# Patient Record
Sex: FEMALE | Race: WHITE | NOT HISPANIC OR LATINO | Employment: PART TIME | URBAN - NONMETROPOLITAN AREA
[De-identification: names, ages, dates, MRNs, and addresses within clinical notes are randomized per-mention and may not be internally consistent; named-entity substitution may affect disease eponyms.]

---

## 2018-01-03 ENCOUNTER — OFFICE VISIT CONVERTED (OUTPATIENT)
Dept: FAMILY MEDICINE CLINIC | Age: 53
End: 2018-01-03
Attending: NURSE PRACTITIONER

## 2018-01-18 ENCOUNTER — CONVERSION ENCOUNTER (OUTPATIENT)
Dept: FAMILY MEDICINE CLINIC | Age: 53
End: 2018-01-18

## 2018-01-20 ENCOUNTER — CONVERSION ENCOUNTER (OUTPATIENT)
Dept: FAMILY MEDICINE CLINIC | Age: 53
End: 2018-01-20

## 2018-01-26 ENCOUNTER — CONVERSION ENCOUNTER (OUTPATIENT)
Dept: FAMILY MEDICINE CLINIC | Age: 53
End: 2018-01-26

## 2018-02-08 ENCOUNTER — CONVERSION ENCOUNTER (OUTPATIENT)
Dept: FAMILY MEDICINE CLINIC | Age: 53
End: 2018-02-08

## 2018-02-13 ENCOUNTER — OFFICE VISIT CONVERTED (OUTPATIENT)
Dept: FAMILY MEDICINE CLINIC | Age: 53
End: 2018-02-13
Attending: NURSE PRACTITIONER

## 2018-02-16 ENCOUNTER — CONVERSION ENCOUNTER (OUTPATIENT)
Dept: FAMILY MEDICINE CLINIC | Age: 53
End: 2018-02-16

## 2018-02-21 ENCOUNTER — CONVERSION ENCOUNTER (OUTPATIENT)
Dept: FAMILY MEDICINE CLINIC | Age: 53
End: 2018-02-21

## 2018-03-14 ENCOUNTER — CONVERSION ENCOUNTER (OUTPATIENT)
Dept: FAMILY MEDICINE CLINIC | Age: 53
End: 2018-03-14

## 2018-04-10 ENCOUNTER — CONVERSION ENCOUNTER (OUTPATIENT)
Dept: FAMILY MEDICINE CLINIC | Age: 53
End: 2018-04-10

## 2018-04-23 ENCOUNTER — CONVERSION ENCOUNTER (OUTPATIENT)
Dept: FAMILY MEDICINE CLINIC | Age: 53
End: 2018-04-23

## 2018-04-27 ENCOUNTER — CONVERSION ENCOUNTER (OUTPATIENT)
Dept: FAMILY MEDICINE CLINIC | Age: 53
End: 2018-04-27

## 2018-05-11 ENCOUNTER — CONVERSION ENCOUNTER (OUTPATIENT)
Dept: FAMILY MEDICINE CLINIC | Age: 53
End: 2018-05-11

## 2018-06-01 ENCOUNTER — CONVERSION ENCOUNTER (OUTPATIENT)
Dept: FAMILY MEDICINE CLINIC | Age: 53
End: 2018-06-01

## 2018-06-22 ENCOUNTER — CONVERSION ENCOUNTER (OUTPATIENT)
Dept: FAMILY MEDICINE CLINIC | Age: 53
End: 2018-06-22

## 2018-07-17 ENCOUNTER — CONVERSION ENCOUNTER (OUTPATIENT)
Dept: FAMILY MEDICINE CLINIC | Age: 53
End: 2018-07-17

## 2018-08-07 ENCOUNTER — CONVERSION ENCOUNTER (OUTPATIENT)
Dept: FAMILY MEDICINE CLINIC | Age: 53
End: 2018-08-07

## 2018-09-04 ENCOUNTER — CONVERSION ENCOUNTER (OUTPATIENT)
Dept: FAMILY MEDICINE CLINIC | Age: 53
End: 2018-09-04

## 2018-09-25 ENCOUNTER — OFFICE VISIT CONVERTED (OUTPATIENT)
Dept: FAMILY MEDICINE CLINIC | Age: 53
End: 2018-09-25
Attending: NURSE PRACTITIONER

## 2018-10-02 ENCOUNTER — CONVERSION ENCOUNTER (OUTPATIENT)
Dept: FAMILY MEDICINE CLINIC | Age: 53
End: 2018-10-02

## 2018-10-23 ENCOUNTER — CONVERSION ENCOUNTER (OUTPATIENT)
Dept: FAMILY MEDICINE CLINIC | Age: 53
End: 2018-10-23

## 2018-10-29 ENCOUNTER — OFFICE VISIT CONVERTED (OUTPATIENT)
Dept: FAMILY MEDICINE CLINIC | Age: 53
End: 2018-10-29
Attending: NURSE PRACTITIONER

## 2018-11-20 ENCOUNTER — CONVERSION ENCOUNTER (OUTPATIENT)
Dept: FAMILY MEDICINE CLINIC | Age: 53
End: 2018-11-20

## 2018-12-20 ENCOUNTER — CONVERSION ENCOUNTER (OUTPATIENT)
Dept: FAMILY MEDICINE CLINIC | Age: 53
End: 2018-12-20

## 2019-01-23 ENCOUNTER — CONVERSION ENCOUNTER (OUTPATIENT)
Dept: FAMILY MEDICINE CLINIC | Age: 54
End: 2019-01-23

## 2019-02-21 ENCOUNTER — CONVERSION ENCOUNTER (OUTPATIENT)
Dept: FAMILY MEDICINE CLINIC | Age: 54
End: 2019-02-21

## 2019-03-08 ENCOUNTER — HOSPITAL ENCOUNTER (OUTPATIENT)
Dept: OTHER | Facility: HOSPITAL | Age: 54
Discharge: HOME | End: 2019-03-08
Attending: FAMILY MEDICINE

## 2019-03-08 ENCOUNTER — OFFICE VISIT CONVERTED (OUTPATIENT)
Dept: FAMILY MEDICINE CLINIC | Age: 54
End: 2019-03-08
Attending: FAMILY MEDICINE

## 2019-03-09 ENCOUNTER — HOSPITAL ENCOUNTER (OUTPATIENT)
Dept: OTHER | Facility: HOSPITAL | Age: 54
Discharge: HOME | End: 2019-03-09
Attending: FAMILY MEDICINE

## 2019-03-09 LAB
ALBUMIN SERPL-MCNC: 4.3 G/DL (ref 3.5–5)
ALBUMIN/GLOB SERPL: 1.5 {RATIO} (ref 1.4–2.6)
ALP SERPL-CCNC: 125 U/L (ref 53–141)
ALT SERPL-CCNC: 20 U/L (ref 10–40)
ANION GAP SERPL CALC-SCNC: 17 MMOL/L (ref 8–19)
AST SERPL-CCNC: 16 U/L (ref 15–50)
BASOPHILS # BLD MANUAL: 0.03 10*3/UL (ref 0–0.2)
BASOPHILS NFR BLD MANUAL: 0.4 % (ref 0–3)
BILIRUB SERPL-MCNC: 0.32 MG/DL (ref 0.2–1.3)
BUN SERPL-MCNC: 12 MG/DL (ref 5–25)
BUN/CREAT SERPL: 19 {RATIO} (ref 6–20)
CALCIUM SERPL-MCNC: 9.3 MG/DL (ref 8.7–10.4)
CHLORIDE SERPL-SCNC: 99 MMOL/L (ref 99–111)
CHOLEST SERPL-MCNC: 200 MG/DL (ref 107–200)
CHOLEST/HDLC SERPL: 3 {RATIO} (ref 3–6)
CONV CO2: 27 MMOL/L (ref 22–32)
CONV CREATININE URINE, RANDOM: 87.9 MG/DL (ref 10–300)
CONV MICROALBUM.,U,RANDOM: <12 MG/L (ref 0–20)
CONV TOTAL PROTEIN: 7.2 G/DL (ref 6.3–8.2)
CREAT UR-MCNC: 0.63 MG/DL (ref 0.5–0.9)
DEPRECATED RDW RBC AUTO: 41.1 FL
EOSINOPHIL # BLD MANUAL: 0.27 10*3/UL (ref 0–0.7)
EOSINOPHIL NFR BLD MANUAL: 3.9 % (ref 0–7)
ERYTHROCYTE [DISTWIDTH] IN BLOOD BY AUTOMATED COUNT: 11.8 % (ref 11.5–14.5)
EST. AVERAGE GLUCOSE BLD GHB EST-MCNC: 140 MG/DL
GFR SERPLBLD BASED ON 1.73 SQ M-ARVRAT: >60 ML/MIN/{1.73_M2}
GLOBULIN UR ELPH-MCNC: 2.9 G/DL (ref 2–3.5)
GLUCOSE SERPL-MCNC: 142 MG/DL (ref 65–99)
GRANS (ABSOLUTE): 4.25 10*3/UL (ref 2–8)
GRANS: 61.7 % (ref 30–85)
HBA1C MFR BLD: 14.8 G/DL (ref 12–16)
HBA1C MFR BLD: 6.5 % (ref 3.5–5.7)
HCT VFR BLD AUTO: 44.1 % (ref 37–47)
HDLC SERPL-MCNC: 67 MG/DL (ref 40–60)
IMM GRANULOCYTES # BLD: 0.02 10*3/UL (ref 0–0.54)
IMM GRANULOCYTES NFR BLD: 0.3 % (ref 0–0.43)
LDLC SERPL CALC-MCNC: 108 MG/DL (ref 70–100)
LYMPHOCYTES # BLD MANUAL: 1.95 10*3/UL (ref 1–5)
LYMPHOCYTES NFR BLD MANUAL: 5.4 % (ref 3–10)
MCH RBC QN AUTO: 31.1 PG (ref 27–31)
MCHC RBC AUTO-ENTMCNC: 33.6 G/DL (ref 33–37)
MCV RBC AUTO: 92.6 FL (ref 81–99)
MICROALBUMIN/CREAT UR: 13.7 MG/G{CRE} (ref 0–35)
MONOCYTES # BLD AUTO: 0.37 10*3/UL (ref 0.2–1.2)
OSMOLALITY SERPL CALC.SUM OF ELEC: 290 MOSM/KG (ref 273–304)
PLATELET # BLD AUTO: 339 10*3/UL (ref 130–400)
PMV BLD AUTO: 8.6 FL (ref 7.4–10.4)
POTASSIUM SERPL-SCNC: 3.8 MMOL/L (ref 3.5–5.3)
RBC # BLD AUTO: 4.76 10*6/UL (ref 4.2–5.4)
SODIUM SERPL-SCNC: 139 MMOL/L (ref 135–147)
TRIGL SERPL-MCNC: 124 MG/DL (ref 40–150)
VARIANT LYMPHS NFR BLD MANUAL: 28.3 % (ref 20–45)
VLDLC SERPL-MCNC: 25 MG/DL (ref 5–37)
WBC # BLD AUTO: 6.89 10*3/UL (ref 4.8–10.8)

## 2019-03-19 ENCOUNTER — OFFICE VISIT CONVERTED (OUTPATIENT)
Dept: FAMILY MEDICINE CLINIC | Age: 54
End: 2019-03-19
Attending: NURSE PRACTITIONER

## 2019-03-21 ENCOUNTER — CONVERSION ENCOUNTER (OUTPATIENT)
Dept: FAMILY MEDICINE CLINIC | Age: 54
End: 2019-03-21

## 2019-04-03 ENCOUNTER — CONVERSION ENCOUNTER (OUTPATIENT)
Dept: FAMILY MEDICINE CLINIC | Age: 54
End: 2019-04-03

## 2019-04-18 ENCOUNTER — CONVERSION ENCOUNTER (OUTPATIENT)
Dept: FAMILY MEDICINE CLINIC | Age: 54
End: 2019-04-18

## 2019-04-29 ENCOUNTER — CONVERSION ENCOUNTER (OUTPATIENT)
Dept: FAMILY MEDICINE CLINIC | Age: 54
End: 2019-04-29

## 2019-05-06 ENCOUNTER — CONVERSION ENCOUNTER (OUTPATIENT)
Dept: FAMILY MEDICINE CLINIC | Age: 54
End: 2019-05-06

## 2019-05-23 ENCOUNTER — CONVERSION ENCOUNTER (OUTPATIENT)
Dept: FAMILY MEDICINE CLINIC | Age: 54
End: 2019-05-23

## 2019-06-06 ENCOUNTER — CONVERSION ENCOUNTER (OUTPATIENT)
Dept: FAMILY MEDICINE CLINIC | Age: 54
End: 2019-06-06

## 2019-06-27 ENCOUNTER — CONVERSION ENCOUNTER (OUTPATIENT)
Dept: FAMILY MEDICINE CLINIC | Age: 54
End: 2019-06-27

## 2019-07-01 ENCOUNTER — CONVERSION ENCOUNTER (OUTPATIENT)
Dept: FAMILY MEDICINE CLINIC | Age: 54
End: 2019-07-01

## 2019-07-15 ENCOUNTER — CONVERSION ENCOUNTER (OUTPATIENT)
Dept: FAMILY MEDICINE CLINIC | Age: 54
End: 2019-07-15

## 2019-07-29 ENCOUNTER — CONVERSION ENCOUNTER (OUTPATIENT)
Dept: FAMILY MEDICINE CLINIC | Age: 54
End: 2019-07-29

## 2019-08-22 ENCOUNTER — CONVERSION ENCOUNTER (OUTPATIENT)
Dept: FAMILY MEDICINE CLINIC | Age: 54
End: 2019-08-22

## 2019-09-20 ENCOUNTER — CONVERSION ENCOUNTER (OUTPATIENT)
Dept: FAMILY MEDICINE CLINIC | Age: 54
End: 2019-09-20

## 2019-10-08 ENCOUNTER — OFFICE VISIT CONVERTED (OUTPATIENT)
Dept: FAMILY MEDICINE CLINIC | Age: 54
End: 2019-10-08
Attending: NURSE PRACTITIONER

## 2019-10-18 ENCOUNTER — CONVERSION ENCOUNTER (OUTPATIENT)
Dept: FAMILY MEDICINE CLINIC | Age: 54
End: 2019-10-18

## 2019-10-18 ENCOUNTER — HOSPITAL ENCOUNTER (OUTPATIENT)
Dept: OTHER | Facility: HOSPITAL | Age: 54
Discharge: HOME | End: 2019-10-18
Attending: NURSE PRACTITIONER

## 2019-10-18 LAB
ALBUMIN SERPL-MCNC: 4.3 G/DL (ref 3.5–5)
ALBUMIN/GLOB SERPL: 1.7 {RATIO} (ref 1.4–2.6)
ALP SERPL-CCNC: 112 U/L (ref 53–141)
ALT SERPL-CCNC: 25 U/L (ref 10–40)
ANION GAP SERPL CALC-SCNC: 18 MMOL/L (ref 8–19)
AST SERPL-CCNC: 17 U/L (ref 15–50)
BILIRUB SERPL-MCNC: 0.39 MG/DL (ref 0.2–1.3)
BUN SERPL-MCNC: 13 MG/DL (ref 5–25)
BUN/CREAT SERPL: 18 {RATIO} (ref 6–20)
CALCIUM SERPL-MCNC: 9.6 MG/DL (ref 8.7–10.4)
CHLORIDE SERPL-SCNC: 101 MMOL/L (ref 99–111)
CHOLEST SERPL-MCNC: 189 MG/DL (ref 107–200)
CHOLEST/HDLC SERPL: 3.2 {RATIO} (ref 3–6)
CONV CO2: 24 MMOL/L (ref 22–32)
CONV TOTAL PROTEIN: 6.8 G/DL (ref 6.3–8.2)
CREAT UR-MCNC: 0.71 MG/DL (ref 0.5–0.9)
EST. AVERAGE GLUCOSE BLD GHB EST-MCNC: 146 MG/DL
GFR SERPLBLD BASED ON 1.73 SQ M-ARVRAT: >60 ML/MIN/{1.73_M2}
GLOBULIN UR ELPH-MCNC: 2.5 G/DL (ref 2–3.5)
GLUCOSE SERPL-MCNC: 129 MG/DL (ref 65–99)
HBA1C MFR BLD: 6.7 % (ref 3.5–5.7)
HDLC SERPL-MCNC: 59 MG/DL (ref 40–60)
LDLC SERPL CALC-MCNC: 106 MG/DL (ref 70–100)
OSMOLALITY SERPL CALC.SUM OF ELEC: 290 MOSM/KG (ref 273–304)
POTASSIUM SERPL-SCNC: 4 MMOL/L (ref 3.5–5.3)
SODIUM SERPL-SCNC: 139 MMOL/L (ref 135–147)
TRIGL SERPL-MCNC: 122 MG/DL (ref 40–150)
VLDLC SERPL-MCNC: 24 MG/DL (ref 5–37)

## 2019-11-18 ENCOUNTER — CONVERSION ENCOUNTER (OUTPATIENT)
Dept: FAMILY MEDICINE CLINIC | Age: 54
End: 2019-11-18

## 2019-11-25 ENCOUNTER — CONVERSION ENCOUNTER (OUTPATIENT)
Dept: FAMILY MEDICINE CLINIC | Age: 54
End: 2019-11-25

## 2019-12-02 ENCOUNTER — CONVERSION ENCOUNTER (OUTPATIENT)
Dept: FAMILY MEDICINE CLINIC | Age: 54
End: 2019-12-02

## 2019-12-09 ENCOUNTER — CONVERSION ENCOUNTER (OUTPATIENT)
Dept: FAMILY MEDICINE CLINIC | Age: 54
End: 2019-12-09

## 2019-12-16 ENCOUNTER — CONVERSION ENCOUNTER (OUTPATIENT)
Dept: FAMILY MEDICINE CLINIC | Age: 54
End: 2019-12-16

## 2019-12-30 ENCOUNTER — CONVERSION ENCOUNTER (OUTPATIENT)
Dept: FAMILY MEDICINE CLINIC | Age: 54
End: 2019-12-30

## 2020-01-13 ENCOUNTER — CONVERSION ENCOUNTER (OUTPATIENT)
Dept: FAMILY MEDICINE CLINIC | Age: 55
End: 2020-01-13

## 2020-01-27 ENCOUNTER — CONVERSION ENCOUNTER (OUTPATIENT)
Dept: FAMILY MEDICINE CLINIC | Age: 55
End: 2020-01-27

## 2020-02-04 ENCOUNTER — CONVERSION ENCOUNTER (OUTPATIENT)
Dept: FAMILY MEDICINE CLINIC | Age: 55
End: 2020-02-04

## 2020-02-11 ENCOUNTER — CONVERSION ENCOUNTER (OUTPATIENT)
Dept: FAMILY MEDICINE CLINIC | Age: 55
End: 2020-02-11

## 2020-02-18 ENCOUNTER — CONVERSION ENCOUNTER (OUTPATIENT)
Dept: FAMILY MEDICINE CLINIC | Age: 55
End: 2020-02-18

## 2020-03-03 ENCOUNTER — CONVERSION ENCOUNTER (OUTPATIENT)
Dept: FAMILY MEDICINE CLINIC | Age: 55
End: 2020-03-03

## 2020-06-10 ENCOUNTER — CONVERSION ENCOUNTER (OUTPATIENT)
Dept: FAMILY MEDICINE CLINIC | Age: 55
End: 2020-06-10

## 2020-06-26 ENCOUNTER — CONVERSION ENCOUNTER (OUTPATIENT)
Dept: FAMILY MEDICINE CLINIC | Age: 55
End: 2020-06-26

## 2020-07-02 ENCOUNTER — CONVERSION ENCOUNTER (OUTPATIENT)
Dept: FAMILY MEDICINE CLINIC | Age: 55
End: 2020-07-02

## 2020-07-15 ENCOUNTER — OFFICE VISIT CONVERTED (OUTPATIENT)
Dept: FAMILY MEDICINE CLINIC | Age: 55
End: 2020-07-15
Attending: NURSE PRACTITIONER

## 2020-07-16 ENCOUNTER — OFFICE VISIT CONVERTED (OUTPATIENT)
Dept: FAMILY MEDICINE CLINIC | Age: 55
End: 2020-07-16
Attending: FAMILY MEDICINE

## 2020-07-18 ENCOUNTER — HOSPITAL ENCOUNTER (OUTPATIENT)
Dept: OTHER | Facility: HOSPITAL | Age: 55
Discharge: HOME | End: 2020-07-18
Attending: NURSE PRACTITIONER

## 2020-07-19 LAB
ALBUMIN SERPL-MCNC: 4.3 G/DL (ref 3.5–5)
ALBUMIN/GLOB SERPL: 1.7 {RATIO} (ref 1.4–2.6)
ALP SERPL-CCNC: 95 U/L (ref 53–141)
ALT SERPL-CCNC: 28 U/L (ref 10–40)
ANION GAP SERPL CALC-SCNC: 19 MMOL/L (ref 8–19)
AST SERPL-CCNC: 18 U/L (ref 15–50)
BILIRUB SERPL-MCNC: 0.46 MG/DL (ref 0.2–1.3)
BUN SERPL-MCNC: 11 MG/DL (ref 5–25)
BUN/CREAT SERPL: 16 {RATIO} (ref 6–20)
CALCIUM SERPL-MCNC: 9.2 MG/DL (ref 8.7–10.4)
CHLORIDE SERPL-SCNC: 99 MMOL/L (ref 99–111)
CHOLEST SERPL-MCNC: 228 MG/DL (ref 107–200)
CHOLEST/HDLC SERPL: 3.5 {RATIO} (ref 3–6)
CONV CO2: 23 MMOL/L (ref 22–32)
CONV CREATININE URINE, RANDOM: 227.2 MG/DL (ref 10–300)
CONV MICROALBUM.,U,RANDOM: 19.6 MG/L (ref 0–20)
CONV TOTAL PROTEIN: 6.8 G/DL (ref 6.3–8.2)
CREAT UR-MCNC: 0.68 MG/DL (ref 0.5–0.9)
EST. AVERAGE GLUCOSE BLD GHB EST-MCNC: 157 MG/DL
GFR SERPLBLD BASED ON 1.73 SQ M-ARVRAT: >60 ML/MIN/{1.73_M2}
GLOBULIN UR ELPH-MCNC: 2.5 G/DL (ref 2–3.5)
GLUCOSE SERPL-MCNC: 161 MG/DL (ref 65–99)
HBA1C MFR BLD: 7.1 % (ref 3.5–5.7)
HDLC SERPL-MCNC: 66 MG/DL (ref 40–60)
LDLC SERPL CALC-MCNC: 129 MG/DL (ref 70–100)
MICROALBUMIN/CREAT UR: 8.6 MG/G{CRE} (ref 0–35)
OSMOLALITY SERPL CALC.SUM OF ELEC: 287 MOSM/KG (ref 273–304)
POTASSIUM SERPL-SCNC: 3.8 MMOL/L (ref 3.5–5.3)
SODIUM SERPL-SCNC: 137 MMOL/L (ref 135–147)
TRIGL SERPL-MCNC: 164 MG/DL (ref 40–150)
VLDLC SERPL-MCNC: 33 MG/DL (ref 5–37)

## 2020-12-10 ENCOUNTER — OFFICE VISIT CONVERTED (OUTPATIENT)
Dept: FAMILY MEDICINE CLINIC | Age: 55
End: 2020-12-10
Attending: NURSE PRACTITIONER

## 2020-12-10 ENCOUNTER — HOSPITAL ENCOUNTER (OUTPATIENT)
Dept: OTHER | Facility: HOSPITAL | Age: 55
Discharge: HOME | End: 2020-12-10
Attending: NURSE PRACTITIONER

## 2021-02-23 ENCOUNTER — OFFICE VISIT CONVERTED (OUTPATIENT)
Dept: FAMILY MEDICINE CLINIC | Age: 56
End: 2021-02-23
Attending: FAMILY MEDICINE

## 2021-05-18 NOTE — PROGRESS NOTES
Marilou EdwardsCharis 1965     Office/Outpatient Visit    Visit Date: Mon, Oct 29, 2018 04:16 pm    Provider: Mary Morales N.P. (Assistant: Drea Morales MA)    Location: [facility]        Electronically signed by Mary Morales N.P. on  10/29/2018 08:46:01 PM                             SUBJECTIVE:        CC:     Mrs. Edwards is a 53 year old White female.  Patient complains of sinus/chest congestion.;         HPI:         Mrs. Edwards presents with upper respiratory illness.  These have been present for the past 4 weeks.  The symptoms include chest congestion, productive cough, headache, nasal congestion, yellow, green nasal discharge, frontal and maxillary sinus pain/pressure and sore throat.  She has already tried to relieve the symptoms with Nyquil.          In regard to the cold sore, there is a solitary skin lesion of concern.  This was first noticed week.  It is located on the nose.  She describes this area as red and tender to touch.  Past medical history is significant for recent cold sores.  history of taking Valtrex for cold sores     ROS:     CONSTITUTIONAL:  Negative for chills, fatigue and fever.      E/N/T:  Negative for ear pain and hoarseness.      CARDIOVASCULAR:  Negative for chest pain, palpitations, tachycardia, orthopnea, and edema.      RESPIRATORY:  Negative for dyspnea and frequent wheezing.          [facility]/:     Last Reviewed on 10/29/2018 05:22 PM by Mary Morales    Past Medical History:                 PAST MEDICAL HISTORY         Pulmonary Embolism: x 2;     Gestational Diabetes     Prothrombin gene mutation (MTHFR) one gene heterozygous/Liden 2     Colon cancer: dx'd in 2004;         GYNECOLOGICAL HISTORY:     miscarriage 1         Surgical History:         Hernia Repair: 2009; Ventral hernia repair;      Appendectomy: ;     Colon Resection: /; oophrectmy/salp;      colostomy reversl fall ; Procedures: colonoscopy 13  normal     Dilation and Curettage: 17; hysterscopy;     COLONOSCOPY: was last done 17 with normal results [facility]         Family History:     Father:  at age 62; Cause of death was MI;  Alcoholism     Mother: Hypertension     Brother(s): 3 brother(s) total; 1 ;  Hypertension;  Drug Abuse ( heroin overdose )     Sister(s): Healthy; 1 sister(s) total;  Colonic Polyps;  Anxiety; Drug Abuse     Paternal Grandfather: Cause of death was MI     Paternal Grandmother: Cause of death was heart/obesity     Maternal Grandfather:  at age 60; Cause of death was MI     Maternal Grandmother:  at age 72; Cause of death was MI;  Type 2 Diabetes         Social History:         Household:  Lives with her father in law.  Occupation: Unemployed     Marital Status:      Children: 3 children         Tobacco/Alcohol/Supplements:     Last Reviewed on 10/29/2018 04:21 PM by Drea Morales    Tobacco: She has a past history of cigarette smoking; quit date:  .          Alcohol: Frequency:    rarely;         Substance Abuse History:     Last Reviewed on 10/29/2018 04:16 PM by Drea Morales    None             Immunizations:     zzFluzone pf-quadrivalent 3 and up 2015     Fluzone (3 + years dose) 2010     Fluzone (3 + years dose) 2013     Fluzone pf (3+ years dose) 2013     Fluzone Quadrivalent (3+ years) 10/23/2018     Adacel (Tdap) 2012         Allergies:     Last Reviewed on 10/29/2018 05:21 PM by Mary Morales    Bactrim:        Current Medications:     Last Reviewed on 10/29/2018 05:21 PM by Mary Morales    Metformin HCl 500mg Tablets, Extended Release Take 2 tablet(s) by mouth daily     Toprol XL 50mg Tablets, Extended Release Take 1 tablet(s) by mouth daily     Cozaar 50mg Tablet Take 1 tablet(s) by mouth daily     Hydrochlorothiazide (HCTZ) 25mg Tablet one a day     Pravastatin 40mg Tablet 1 tab daily     EpiPen Auto-Injector 1:1,000 Solution For Injection As  Directed     Coumadin 5mg Tablet 6mg tab on Tues 5mg rest     Benadryl Allergy     Oxycodone/Acetaminophen  5mg/325mg Tablet 1 BID PRN     Vitamin D3 2,000IU Capsules 1 capsule daily     Valtrex 1gm Tablet 1 bid prn     Amoxicillin 875mg Tablet One PO BID X 10 days.     Diflucan 150mg Tablet 1 po now may repeat in a week if needed         OBJECTIVE:        Vitals:         Current: 10/29/2018 4:20:19 PM    Ht:  5 ft, 6 in;  Wt: 218.6 lbs;  BMI: 35.3    T: 98 F (oral);  BP: 133/65 mm Hg (left arm, sitting);  P: 66 bpm (left arm (BP Cuff), sitting);  sCr: 0.67 mg/dL;  GFR: 115.48    O2 Sat: 100 % (room air)        Exams:     PHYSICAL EXAM:     GENERAL: well developed, well nourished;     E/N/T: EARS:  normal external auditory canals and tympanic membranes;  grossly normal hearing; NOSE: bilateral maxillary and bilateral frontal sinus tenderness present; OROPHARYNX:  normal mucosa, dentition, gingiva, and posterior pharynx;     RESPIRATORY: normal respiratory rate and pattern with no distress; normal breath sounds with no rales, rhonchi, wheezes or rubs;     CARDIOVASCULAR: normal rate; rhythm is regular;  no systolic murmur;     LYMPHATIC: no enlargement of cervical or facial nodes;         ASSESSMENT           461.8   J01.90  Acute sinusitis, other              DDx:     054.9   B00.89  Cold sore              DDx:     V12.51   Z86.718  History of venous thrombosis and embolism              DDx:         ORDERS:         Meds Prescribed:       Refill of: Valtrex (Valacyclovir) 1gm Tablet 1 bid prn  #60 (Sixty) tablet(s) Refills: 0       Refill of: Amoxicillin 875mg Tablet One PO BID X 10 days.  #20 (Twenty) tablet(s) Refills: 0       Refill of: Diflucan (Fluconazole) 150mg Tablet 1 PO  #1 (One) tablet(s) Refills: 0                 PLAN:          Acute sinusitis, other to recheck her INR at 2 weeks instead of 3         RECOMMENDATIONS given include: rest and increase oral fluid intake.      FOLLOW-UP: Advised to call if  there is no improvement in 2-4 day(s).            Prescriptions:       Refill of: Amoxicillin 875mg Tablet One PO BID X 10 days.  #20 (Twenty) tablet(s) Refills: 0       Refill of: Diflucan (Fluconazole) 150mg Tablet 1 PO  #1 (One) tablet(s) Refills: 0          Cold sore         RECOMMENDATIONS given include: take medication as prescribed.            Prescriptions:       Refill of: Valtrex (Valacyclovir) 1gm Tablet 1 bid prn  #60 (Sixty) tablet(s) Refills: 0          History of venous thrombosis and embolism to have her INR checked sooner, she is on coumadin and her INR was 2.1 on 10-23-18             Patient Recommendations:        For  Acute sinusitis, other:     Get plenty of rest. Increase oral fluid intake.              CHARGE CAPTURE           **Please note: ICD descriptions below are intended for billing purposes only and may not represent clinical diagnoses**        Primary Diagnosis:         461.8 Acute sinusitis, other            J01.90    Acute sinusitis, unspecified              Orders:          18038   Office/outpatient visit; established patient, level 4  (In-House)           054.9 Cold sore            B00.89    Other herpesviral infection    V12.51 History of venous thrombosis and embolism            Z86.718    Personal history of other venous thrombosis and embolism

## 2021-05-18 NOTE — PROGRESS NOTES
Marilou Edwards  1965     Office/Outpatient Visit    Visit Date: Wed, Jul 15, 2020 04:16 pm    Provider: Mary Morales N.P. (Assistant: Virginia Calvo MA)    Location: [facility]        Electronically signed by Mary Morales N.P. on  07/15/2020 06:59:42 PM                             Subjective:        CC: Mrs. Edwards is a 55 year old White female.  This is a follow-up visit.  CHECK UP;         HPI:           Patient presents with type 2 diabetes mellitus without complications.  She does not perform home blood glucose monitoring.  Most recent lab results include Hemoglobin A1c:  6.7 (%) (10/18/2019), LDL:  106 (mg/dL) (10/18/2019), HDL:  59 (mg/dL) (10/18/2019), Triglycerides:  122 (mg/dL) (10/18/2019), Dilated Eye Exam by date:  02/20/2020 (03/03/2020).            With regard to the essential (primary) hypertension, her current cardiac medication regimen includes a diuretic ( Hydrochlorothiazide (HCTZ) ), a beta-blocker ( Toprol-XL ), and an angiotensin receptor blocker ( Cozaar ).  She did not bring her blood pressure diary, but says that typical readings show systolics in the 120-130 and diastolics in the 60-70 range.  She is tolerating the medication well without side effects.  Compliance with treatment has been good; she takes her medication as directed.            Hyperlipidemia, unspecified details; current treatment includes Pravachol.  Compliance with treatment has been good; she takes her medication as directed.  She denies experiencing any hypercholesterolemia related symptoms.      ROS:     CONSTITUTIONAL:  Negative for chills, fatigue, fever, and weight change.      CARDIOVASCULAR:  Negative for chest pain, palpitations, tachycardia, orthopnea, and edema.      RESPIRATORY:  Negative for cough, dyspnea, and hemoptysis.      HEMATOLOGIC/LYMPHATIC:  Positive for history of PE on coumadin, interest in changing rx.      INTEGUMENTARY/BREAST:  Positive for place on her back  two weeks,  was raised, using topical ATB.      NEUROLOGICAL:  Negative for dizziness, headaches, paresthesias, and weakness.          Past Medical History / Family History / Social History:         Last Reviewed on 7/15/2020 04:42 PM by Mary Morales    Past Medical History:                 PAST MEDICAL HISTORY         Pulmonary Embolism: x 2;     Gestational Diabetes     Prothrombin gene mutation (MTHFR) one gene heterozygous/Liden 2     Colon cancer: dx'd in 2004;         GYNECOLOGICAL HISTORY:     miscarriage 1         Surgical History:         Hernia Repair: 2009; Ventral hernia repair;     Appendectomy: ;     Colon Resection: /; oophrectmy/salp;     colostomy reversl fall ; Procedures: colonoscopy 13 normal     Dilation and Curettage: 17; hysterscopy; Other Surgeries    LiF DIP  index mucous cyst excision  19;     COLONOSCOPY: was last done 17 with normal results [facility]         Family History:     Father:  at age 62; Cause of death was MI;  Alcoholism     Mother: Hypertension     Brother(s): 3 brother(s) total; 1 ;  Hypertension;  Drug Abuse ( heroin overdose )     Sister(s): Healthy; 1 sister(s) total;  Colonic Polyps;  Anxiety; Drug Abuse     Paternal Grandfather: Cause of death was MI     Paternal Grandmother: Cause of death was heart/obesity     Maternal Grandfather:  at age 60; Cause of death was MI     Maternal Grandmother:  at age 72; Cause of death was MI;  Type 2 Diabetes         Social History:         Household:  Lives with her father in law.  Occupation: Unemployed     Marital Status:      Children: 3 children         Tobacco/Alcohol/Supplements:     Last Reviewed on 10/08/2019 02:50 PM by Virginia Calvo    Tobacco: She has a past history of cigarette smoking; quit date:  .          Alcohol: Frequency:    rarely;         Substance Abuse History:     Last Reviewed on 3/08/2019 03:04 PM by Saul Hayes     None         Mental Health History:     Last Reviewed on 3/08/2019 03:04 PM by Saul Hayes        Communicable Diseases (eg STDs):     Last Reviewed on 3/08/2019 03:04 PM by Saul Hayes        Immunizations:     Hep A, adult dose 11/13/2018    zzFluzone pf-quadrivalent 3 and up 12/8/2015    Fluzone (3 + years dose) 11/4/2010    Fluzone (3 + years dose) 1/22/2013    Fluzone pf (3+ years dose) 12/9/2013    Fluzone Quadrivalent (3+ years) 10/23/2018    Fluzone Quadrivalent (3+ years) 10/18/2019    Adacel (Tdap) 6/12/2012        Allergies:     Last Reviewed on 7/15/2020 04:20 PM by Virginia Calvo    Bactrim:          Current Medications:     Last Reviewed on 7/15/2020 04:42 PM by Mary Morales    warfarin 5 mg oral tablet [TAKE 6 MG BY MOUTH ONCE DAILY ON TUESDAYS AND 5 MG BY MOUTH ONCE DAILY THE REST OF THE DAYS OF THE WEEK]    hydroCHLOROthiazide 25 mg oral tablet [Take 1 tablet by mouth once daily]    Toprol XL 50 mg oral Tablet, Extended Release 24 hr [Take 1 tablet by mouth once daily]    EpiPen Auto-Injector 1:1,000 Solution For Injection [As Directed]    metFORMIN 500 mg oral Tablet, Extended Release 24 hr [Take 2 tablets by mouth once daily]    Glucose Reagent Blood Test Strips  Reagent Strips [Check  blood sugars QID daily. Dispense brand covered by insurance.e11.9]    Valtrex 1gm Tablet [1 bid prn]    pravastatin 40 mg oral tablet [Take 1 tablet by mouth once daily]    Cozaar 50 mg oral tablet [TAKE 1 TABLET BY MOUTH ONCE DAILY - DUE FOR APPT]    Vitamin D3 50 mcg (2,000 unit) oral capsule [1 capsule daily]    oxyCODONE-acetaminophen 5-325 mg oral tablet [1 BID PRN]    Benadryl Allergy     alclometasone Dipro 0.05%      Lancet   Lancet [Check  blood sugars once daily. Dispense brand covered by insurance.]    Allergy Relief (fluticasone) 50 mcg/actuation Intranasal Spray, Suspension [inhale 2 spray (50 mcg) in each nostril by intranasal route once daily]        Objective:        Vitals:          Current: 7/15/2020 4:22:16 PM    Ht:  5 ft, 6 in;  Wt: 221.4 lbs;  BMI: 35.7T: 98.2 F (temporal);  BP: 137/71 mm Hg (left arm, sitting);  P: 85 bpm (left arm (BP Cuff), sitting);  sCr: 0.71 mg/dL;  GFR: 107.12        Exams:     PHYSICAL EXAM:     GENERAL: vital signs recorded - well developed, well nourished;  no apparent distress;     NECK: carotid exam reveals no bruits;     RESPIRATORY: normal respiratory rate and pattern with no distress; normal breath sounds with no rales, rhonchi, wheezes or rubs;     CARDIOVASCULAR: normal rate; rhythm is regular;  no systolic murmur;    Peripheral Pulses: posterior tibial: 2+ amplitude, no bruits; no edema;     BREAST/INTEGUMENT: 3-4 mm round abraded area, no exudate or induration,  right lower back; skin of feet/toenails intact;     NEUROLOGIC: sensation intact to monofilament bilaterally;     PSYCHIATRIC:  appropriate affect and demeanor; normal speech pattern; grossly normal memory;         Assessment:         E11.9   Type 2 diabetes mellitus without complications       I10   Essential (primary) hypertension       E78.5   Hyperlipidemia, unspecified       Z86.718   Personal history of other venous thrombosis and embolism           ORDERS:         Meds Prescribed:       [Refilled] metFORMIN 500 mg oral Tablet, Extended Release 24 hr [Take 2 tablets by mouth once daily], #180 (one hundred and eighty) each, Refills: 0 (zero)       [Refilled] pravastatin 40 mg oral tablet [Take 1 tablet by mouth once daily], #90 (ninety) each, Refills: 0 (zero)         Lab Orders:       FUTURE  Future order to be done at patients convenience  (Send-Out)            59216  DIAB2 - [facility] CMP A1C LIPID AND MICRO ALBUM CR RATIO: [id number]  (Send-Out)                      Plan:         Type 2 diabetes mellitus without complicationsneeds new meter rx given/for the place onher  lower back  continue neosporin, consider derm referral, let me know in a few weeks if not cleared up/  updated diabetes flow sheet, continue metformin        FOLLOW-UP TESTING #1: FOLLOW-UP LABORATORY:  Labs to be scheduled in the future include Diabetes Panel 2;CMP, A1C, Lipid, Microalbumin:Creatinine Ratio.      FOLLOW-UP: fastiing for labs           Prescriptions:       [Refilled] metFORMIN 500 mg oral Tablet, Extended Release 24 hr [Take 2 tablets by mouth once daily], #180 (one hundred and eighty) each, Refills: 0 (zero)           Orders:       FUTURE  Future order to be done at patients convenience  (Send-Out)            91008  DIAB - [facility] CMP A1C LIPID AND MICRO ALBUM CR RATIO: [id number]  (Send-Out)              Essential (primary) hypertensioncontinue her bp rx's, refills were send earlier today to her new pharmacy, [facility]         RECOMMENDATIONS given include: perform routine monitoring of blood pressure with home blood pressure cuff.          Hyperlipidemia, unspecified        RECOMMENDATIONS given include: exercise and low cholesterol/low fat diet.      FOLLOW-UP: Schedule a follow-up visit in 6 months.            Prescriptions:       [Refilled] pravastatin 40 mg oral tablet [Take 1 tablet by mouth once daily], #90 (ninety) each, Refills: 0 (zero)         Personal history of other venous thrombosis and embolismschedule an appt with Dr Dhaliwal, she agreed to see her re this and may make a change in rx she is currently on coumadin        FOLLOW-UP:.  :for change in rx, with Dr. Dhaliwal             Patient Recommendations:        For  Type 2 diabetes mellitus without complications:            The following laboratory testing has been ordered:         For  Essential (primary) hypertension:    Begin monitoring your blood pressure by brief nurse visits at our office, a home blood pressure monitor, or by checking on the machines in pharmacies or stores.  Keep a log of the readings.          For  Hyperlipidemia, unspecified:    Maintain a regular exercise program. Reduce the amount of cholesterol and  saturated fat in your diet.  Schedule a follow-up visit in 6 months.          For  Personal history of other venous thrombosis and embolism:                    APPOINTMENT INFORMATION:        Monday Tuesday Wednesday Thursday Friday Saturday Sunday            Time:___________________AM  PM   Date:_____________________             Charge Capture:         Primary Diagnosis:     E11.9  Type 2 diabetes mellitus without complications           Orders:      24751  Office/outpatient visit; established patient, level 4  (In-House)              I10  Essential (primary) hypertension     E78.5  Hyperlipidemia, unspecified     Z86.718  Personal history of other venous thrombosis and embolism

## 2021-05-18 NOTE — PROGRESS NOTES
Marilou EdwardsCharis 1965     Office/Outpatient Visit    Visit Date: Wed, Nicho 3, 2018 12:37 pm    Provider: Mary Morales N.P. (Assistant: Gayle Talley MA)    Location: [facility]        Electronically signed by Mary Morales N.P. on  2018 09:40:49 PM                             SUBJECTIVE:        CC:     Mrs. Edwards is a 52 year old White female.  itching on body;         HPI:     Itching     The symptom began few weeks ago.  Rash started in her vulva area, now she is having itching everywhere She had a GI bug in mid November, then developed cough with chest congestion/  first treated with steroids, then came in late december with vaginal itching and slight discharge  and and then was treated with  clindamycin.  No other family members has a rash.     ROS:     CONSTITUTIONAL:  Negative for fever.      E/N/T:  Negative for sore throat.      CARDIOVASCULAR:  Negative for chest pain, palpitations, tachycardia, orthopnea, and edema.      RESPIRATORY:  Negative for dyspnea.      GASTROINTESTINAL:  Negative for diarrhea.      GENITOURINARY:  Positive for had some clue cells on wet prep/ .   Negative for vaginal discharge.          [facility]/:     Last Reviewed on 2018 01:07 PM by Mary Morales    Past Medical History:                 PAST MEDICAL HISTORY         Pulmonary Embolism: x 2;     Gestational Diabetes     Prothrombin gene mutation (MTHFR) one gene heterozygous/Liden 2     Colon cancer: dx'd in 2004;         GYNECOLOGICAL HISTORY:     miscarriage 1         Surgical History:         Hernia Repair: 2009; Ventral hernia repair;      Appendectomy: ;     Colon Resection: /; oophrectmy/salp;      colostomy reversl fall ; Procedures: colonoscopy 13 normal     Dilation and Curettage: 17; hysterscopy;     COLONOSCOPY: was last done 17 with normal results [place]         Family History:     Father:  at age 62; Cause of death  was MI;  Alcoholism     Mother: Hypertension     Brother(s): 3 brother(s) total; 1 ;  Hypertension;  Drug Abuse ( heroin overdose )     Sister(s): Healthy; 1 sister(s) total;  Colonic Polyps;  Anxiety; Drug Abuse     Paternal Grandfather: Cause of death was MI     Paternal Grandmother: Cause of death was heart/obesity     Maternal Grandfather:  at age 60; Cause of death was MI     Maternal Grandmother:  at age 72; Cause of death was MI;  Type 2 Diabetes         Social History:         Household:  Lives with her father in law.  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          Alcohol: Frequency:    rarely;         Substance Abuse History:     None             Immunizations:     Fluzone pf-quadrivalent 3 and up 2015     Fluzone (3 + years dose) 2010     Fluzone (3 + years dose) 2013     Fluzone pf (3+ years dose) 2013     Adacel (Tdap) 2012         Allergies:     Last Reviewed on 2018 12:39 PM by Gayle Talley    Bactrim:        Current Medications:     Last Reviewed on 2018 12:41 PM by Gayle Talley    Coumadin 5mg Tablet 1 tab daily     Coumadin 1mg Tablet 6mg tu,thur 5mg other days     Cozaar 50mg Tablet Take 1 tablet(s) by mouth daily     Hydrochlorothiazide (HCTZ) 25mg Tablet one a day     Metformin HCl 500mg Tablets, Extended Release Take 2 tablet(s) by mouth daily     Pravastatin 40mg Tablet 1 tab daily     Toprol XL 50mg Tablets, Extended Release Take 1 tablet(s) by mouth daily     Valtrex 1gm Tablet 1 bid prn     EpiPen Auto-Injector 1:1,000 Solution For Injection As Directed     Nystatin 100,000U/1gm Cream apply 2-3 times a day     Oxycodone/Acetaminophen  5mg/325mg Tablet 1 BID PRN     Vitamin D3 2,000IU Capsules 1 capsule daily      Benadryl Allergy         OBJECTIVE:        Vitals:         Current: 1/3/2018 12:38:46 PM    Ht:  5 ft, 6 in;  Wt: 214.5 lbs;  BMI: 34.6    T: 98.2 F (oral);  BP: 156/85 mm Hg (left arm, sitting);  P: 81 bpm (left arm (BP Cuff), sitting);  sCr: 0.64 mg/dL;  GFR: 121.27        Exams:     PHYSICAL EXAM:     GENERAL: vital signs recorded - well developed, well nourished;  no apparent distress;     RESPIRATORY: normal respiratory rate and pattern with no distress; normal breath sounds with no rales, rhonchi, wheezes or rubs;     CARDIOVASCULAR: normal rate; rhythm is regular;  no systolic murmur; no edema;     BREAST/INTEGUMENT: a rash is noted on the back and left upper chest;  the color is mainly pink;  it is best characterized as excoriated;     PSYCHIATRIC:  appropriate affect and demeanor; normal speech pattern; grossly normal memory;         ASSESSMENT           698.9   L29.9  Itching              DDx:     250.00   E11.9  Type 2 diabetes              DDx:     V58.61   Z79.01  Anticoagulation followup              DDx:     V69.8   Z72.89  Other problems related to lifestyle              DDx:         ORDERS:         Meds Prescribed:       Diflucan (Fluconazole) 150mg Tablet 1 po now may repeat in a week if needed  #2 (Two) tablet(s) Refills: 0         Lab Orders:       [id number]  DIAB1 - [facility] LIPID,CMP, A1C: 50808, 37177, 83911  (Send-Out)         70509  PRO-T - [facility] Prothrombin Time PT/INR  (Send-Out)         [id number]  HPCAB - [facility] Hepatitis C antibody  (Send-Out)                   PLAN:          Itching continue benadryl, consider further evaluation/treatment if symptoms persist         FOLLOW-UP: Advised to call if there is no improvement Follow-up by phone if no improvement in 1 week..  .            Prescriptions:       Diflucan (Fluconazole) 150mg Tablet 1 po now may repeat in a week if needed  #2 (Two) tablet(s) Refills: 0          Type 2 diabetes     LABORATORY:  Labs ordered to be performed today include Diabetes Panel  1; CMP, Lipid, A1C.            Orders:       [id number]  DIAB1 - [facility] LIPID,CMP, A1C: 78007, 65686, 29355  (Send-Out)            Anticoagulation followup increase her coumadin to 6 mg three days a week instead of 2 days a week, 5 mg the other days of the week, recheck INR in 2 weeks     LABORATORY:  Labs ordered to be performed today include INR.            Orders:       [id number]  PRO-T - [facility] Prothrombin Time PT/INR  (Send-Out)            Other problems related to lifestyle     LABORATORY:  Labs ordered to be performed today include Hepatitis C Antibody.            Orders:       [id number]  HPCAB - [facility] Hepatitis C antibody  (Send-Out)             Patient Education Handouts:       Hepatitis C              Patient Recommendations:        For  Itching:     Follow-up by phone if no improvement in 1 week.                APPOINTMENT INFORMATION:        Monday Tuesday Wednesday Thursday Friday Saturday Sunday            Time:___________________AM  PM   Date:_____________________             CHARGE CAPTURE           **Please note: ICD descriptions below are intended for billing purposes only and may not represent clinical diagnoses**        Primary Diagnosis:         698.9 Itching            L29.9    Pruritus, unspecified              Orders:          [id number]   Office/outpatient visit; established patient, level 3  (In-House)           250.00 Type 2 diabetes            E11.9    Type 2 diabetes mellitus without complications    V58.61 Anticoagulation followup            Z79.01    Long term (current) use of anticoagulants    V69.8 Other problems related to lifestyle            Z72.89    Other problems related to lifestyle

## 2021-05-18 NOTE — PROGRESS NOTES
Marilou Edwards 1965     Office/Outpatient Visit    Visit Date: Tue, Sep 25, 2018 09:06 am    Provider: Mary Morales N.P. (Assistant: Viry Day MA)    Location: [facility]        Electronically signed by Mary Morales N.P. on  09/25/2018 10:03:20 AM                             SUBJECTIVE:        CC:     Mrs. Edwards is a 53 year old White female.  This is a follow-up visit.  6mo follow up; ( PATIENT IS ONLY TAKING 6MG OF COUMADIN ON TUESDAY ONLY )         HPI:         Patient to be evaluated for hypertension.  Her current cardiac medication regimen includes a diuretic ( hctz ), a beta-blocker ( Toprol-XL ), and an angiotensin receptor blocker ( Cozaar ).  She is tolerating the medication well without side effects.  Compliance with treatment has been good; she takes her medication as directed.          With regard to the type 2 diabetes, current meds include an oral hypoglycemic ( Glucophage XR ( 500mg bid ) ).  She reports home blood glucose readings have averaged fasting readings in the 100's - 130's mg/dL range.  Most recent lab results include Hemoglobin A1c:  6.8 (%) (07/25/2017), LDL:  120 (mg/dL) (03/10/2018), HDL:  68 (mg/dL) (03/10/2018), Triglycerides:  108 (mg/dL) (03/10/2018), Microalbuminuria:  8.8 (mg/g creat) (07/25/2017), Dilated Eye Exam by date:  09/05/2015 (07/25/2017), Foot Exam (Annual):  07/25/2017 (07/25/2017).          Concerning hypercholesterolemia, current treatment includes Pravachol.  Compliance with treatment has been good; she takes her medication as directed.  She denies experiencing any hypercholesterolemia related symptoms.      ROS:     CONSTITUTIONAL:  Negative for chills, fatigue, fever, and weight change.      CARDIOVASCULAR:  Negative for chest pain, palpitations, tachycardia, orthopnea, and edema.      RESPIRATORY:  Negative for cough, dyspnea, and hemoptysis.      MUSCULOSKELETAL:  Positive for arthralgias.      NEUROLOGICAL:  Negative for  dizziness, headaches, paresthesias, and weakness.          PMH/FMH/SH:     Last Reviewed on 2018 09:21 AM by Mary Morales    Past Medical History:                 PAST MEDICAL HISTORY         Pulmonary Embolism: x 2;     Gestational Diabetes     Prothrombin gene mutation (MTHFR) one gene heterozygous/Liden 2     Colon cancer: dx'd in 2004;         GYNECOLOGICAL HISTORY:     miscarriage 1         Surgical History:         Hernia Repair: 2009; Ventral hernia repair;      Appendectomy: ;     Colon Resection: /; oophrectmy/salp;      colostomy reversl fall ; Procedures: colonoscopy 13 normal     Dilation and Curettage: 17; hysterscopy;     COLONOSCOPY: was last done 17 with normal results [place]         Family History:     Father:  at age 62; Cause of death was MI;  Alcoholism     Mother: Hypertension     Brother(s): 3 brother(s) total; 1 ;  Hypertension;  Drug Abuse ( heroin overdose )     Sister(s): Healthy; 1 sister(s) total;  Colonic Polyps;  Anxiety; Drug Abuse     Paternal Grandfather: Cause of death was MI     Paternal Grandmother: Cause of death was heart/obesity     Maternal Grandfather:  at age 60; Cause of death was MI     Maternal Grandmother:  at age 72; Cause of death was MI;  Type 2 Diabetes         Social History:         Household:  Lives with her father in law.  Occupation: Homemaker     Marital Status:      Children: 3 children         Tobacco/Alcohol/Supplements:     Last Reviewed on 2018 09:09 AM by Viry Day    Tobacco: She has a past history of cigarette smoking; quit date:  .          Alcohol: Frequency:    rarely;         Substance Abuse History:     None             Immunizations:     zzFluzone pf-quadrivalent 3 and up 2015     Fluzone (3 + years dose) 2010     Fluzone (3 + years dose) 2013     Fluzone pf (3+ years dose) 2013     Adacel (Tdap) 2012         Allergies:      Last Reviewed on 9/25/2018 09:09 AM by Viry Day    Bactrim:        Current Medications:     Last Reviewed on 9/25/2018 09:11 AM by Viry Day    Metformin HCl 500mg Tablets, Extended Release Take 2 tablet(s) by mouth daily     Pravastatin 40mg Tablet 1 tab daily     Toprol XL 50mg Tablets, Extended Release Take 1 tablet(s) by mouth daily     Cozaar 50mg Tablet Take 1 tablet(s) by mouth daily     Hydrochlorothiazide (HCTZ) 25mg Tablet one a day     EpiPen Auto-Injector 1:1,000 Solution For Injection As Directed     Coumadin 5mg Tablet 6mg tab on Tues/Sat and 5mg tab all other days.     Benadryl Allergy     Oxycodone/Acetaminophen  5mg/325mg Tablet 1 BID PRN     Vitamin D3 2,000IU Capsules 1 capsule daily         OBJECTIVE:        Vitals:         Current: 9/25/2018 9:13:21 AM    Ht:  5 ft, 6 in;  Wt: 216 lbs;  BMI: 34.9    T: 98.2 F (oral);  BP: 113/66 mm Hg (left arm, sitting);  P: 76 bpm (left arm (BP Cuff), sitting);  sCr: 0.66 mg/dL;  GFR: 116.64        Exams:     PHYSICAL EXAM:     GENERAL: vital signs recorded - well developed, well nourished;  no apparent distress;     NECK: carotid exam reveals no bruits;     RESPIRATORY: normal respiratory rate and pattern with no distress; normal breath sounds with no rales, rhonchi, wheezes or rubs;     CARDIOVASCULAR: normal rate; rhythm is regular;  no systolic murmur;    Peripheral Pulses: posterior tibial: equal bilaterally;  no edema;     BREAST/INTEGUMENT: skin of feet and toenails intact;     NEUROLOGIC: sensation intact to monofilament bilaterally;     PSYCHIATRIC:  appropriate affect and demeanor; normal speech pattern; grossly normal memory;         ASSESSMENT           401.1   I10  Hypertension              DDx:     250.00   E11.9  Type 2 diabetes              DDx:     272.0   E78.5  Hypercholesterolemia              DDx:     V58.61   Z79.01  Anticoagulation followup              DDx:         ORDERS:         Meds Prescribed:       Refill of: Cozaar  (Losartan) 50mg Tablet Take 1 tablet(s) by mouth daily  #90 (Ninety) tablet(s) Refills: 1       Refill of: Hydrochlorothiazide (HCTZ) 25mg Tablet one a day  #90 (Ninety) tablet(s) Refills: 1       Refill of: Pravastatin 40mg Tablet 1 tab daily  #90 (Ninety) tablet(s) Refills: 1         Lab Orders:       [id number]  DIAB - [facility] CMP A1C LIPID AND MICRO ALBUM CR RATIO: [id number]  (Send-Out)                   PLAN:          Hypertension advised to get hep a and flu vaccines, needs an eye exam (will get flu vaccine in )         FOLLOW-UP: Schedule a follow-up visit in 6 months.            Prescriptions:       Refill of: Cozaar (Losartan) 50mg Tablet Take 1 tablet(s) by mouth daily  #90 (Ninety) tablet(s) Refills: 1       Refill of: Hydrochlorothiazide (HCTZ) 25mg Tablet one a day  #90 (Ninety) tablet(s) Refills: 1           Patient Education Handouts:       Hepatitis A           Type 2 diabetes updated diabetes flow sheet /she will make her own optometry appt     LABORATORY:  Labs ordered to be performed today include Diabetes Panel 2;CMP, A1C, Lipid, Microalbumin:Creatinine Ratio.            Orders:       [id number]  [address] CMP A1C LIPID AND MICRO ALBUM CR RATIO: [id number]  (Send-Out)            Hypercholesterolemia           Prescriptions:       Refill of: Pravastatin 40mg Tablet 1 tab daily  #90 (Ninety) tablet(s) Refills: 1          Anticoagulation followup due her follow up INR next week             Patient Recommendations:        For  Hypertension:     Schedule a follow-up visit in 6 months.              CHARGE CAPTURE           **Please note: ICD descriptions below are intended for billing purposes only and may not represent clinical diagnoses**        Primary Diagnosis:         401.1 Hypertension            I10    Essential (primary) hypertension              Orders:          [id number]   Office/outpatient visit; established patient, level 4  (In-House)           250.00 Type 2  diabetes            E11.9    Type 2 diabetes mellitus without complications    272.0 Hypercholesterolemia            E78.5    Hyperlipidemia, unspecified    V58.61 Anticoagulation followup            Z79.01    Long term (current) use of anticoagulants

## 2021-05-18 NOTE — PROGRESS NOTES
Marilou Edwards. 1965     Office/Outpatient Visit    Visit Date: Fri, Mar 8, 2019 02:25 pm    Provider: Saul Hayes MD (Assistant: Drea Morales MA)    Location: [facility]        Electronically signed by Saul Hayes MD on  03/08/2019 04:09:54 PM                             SUBJECTIVE:        CC:     Mrs. Edwards is a 53 year old White female.  Patient is here for surgery clearance on her left hand.;         HPI:     Pt is going to have hand surgery with [facility] and she needs pre-op clearance. She brings paperwork stating clearance will include an EKG and CXR. Pt has h/o pulmonary embolism twice and is on warfarin. She also has DM 2 that appears well controlled with most recent A1c 6.7. She is due to a new A1c. She has HTN which is well controlled with metoprolol 50 mg qd and losartan 50 mg qd and HCTZ 25 mg qd. She is feeling well and healthy except for usual aches and pains. No shortness of breath or chest pain.     - She is having a digital mucoid cyst removed.     Pt has DM 2, she is on metformin 500 mg BID. Most recent A1c was 6.7 and she is due for labwork.     ROS:     CONSTITUTIONAL:  Negative for chills, fatigue, fever, and weight change.      E/N/T:  Negative for hearing problems, E/N/T pain, congestion, rhinorrhea, epistaxis, hoarseness, and dental problems.      CARDIOVASCULAR:  Negative for chest pain, palpitations, tachycardia, orthopnea, and edema.      RESPIRATORY:  Negative for cough, dyspnea, and hemoptysis.      GASTROINTESTINAL:  Negative for abdominal pain, heartburn, constipation, diarrhea, and stool changes.      MUSCULOSKELETAL:  Positive for arthralgias.      NEUROLOGICAL:  Negative for dizziness, headaches, paresthesias, and weakness.      PSYCHIATRIC:  Negative for anxiety, depression, and sleep disturbances.          PMH/FMH/SH:     Last Reviewed on 3/08/2019 03:04 PM by Saul Hayes    Past Medical History:                 PAST  MEDICAL HISTORY         Pulmonary Embolism: x 2;     Gestational Diabetes     Prothrombin gene mutation (MTHFR) one gene heterozygous/Liden 2     Colon cancer: dx'd in 2004;         GYNECOLOGICAL HISTORY:     miscarriage 1         Surgical History:         Hernia Repair: 2009; Ventral hernia repair;      Appendectomy: ;     Colon Resection: /; oophrectmy/salp;      colostomy reversl fall ; Procedures: colonoscopy 13 normal     Dilation and Curettage: 17; hysterscopy;     COLONOSCOPY: was last done 17 with normal results [place]         Family History:     Father:  at age 62; Cause of death was MI;  Alcoholism     Mother: Hypertension     Brother(s): 3 brother(s) total; 1 ;  Hypertension;  Drug Abuse ( heroin overdose )     Sister(s): Healthy; 1 sister(s) total;  Colonic Polyps;  Anxiety; Drug Abuse     Paternal Grandfather: Cause of death was MI     Paternal Grandmother: Cause of death was heart/obesity     Maternal Grandfather:  at age 60; Cause of death was MI     Maternal Grandmother:  at age 72; Cause of death was MI;  Type 2 Diabetes         Social History:         Household:  Lives with her father in law.  Occupation: Unemployed     Marital Status:      Children: 3 children         Tobacco/Alcohol/Supplements:     Last Reviewed on 3/08/2019 03:04 PM by Saul Hayes    Tobacco: She has a past history of cigarette smoking; quit date:  .          Alcohol: Frequency:    rarely;         Substance Abuse History:     Last Reviewed on 3/08/2019 03:04 PM by Saul Hayes    None         Mental Health History:     Last Reviewed on 3/08/2019 03:04 PM by Saul Hayes        Communicable Diseases (eg STDs):     Last Reviewed on 3/08/2019 03:04 PM by Saul Hayes            Current Problems:     Last Reviewed on 3/08/2019 03:04 PM by Saul Hayes    Other disease of nasal cavity and sinuses     Anxiety     Fatigue     Other  specified idiopathic peripheral neuropathy     Joint pain     History of venous thrombosis and embolism     Hypercholesterolemia     Type 2 diabetes     Hypertension     Nasal lesion     Colon cancer     Pre-procedural general physical examination     Anticoagulation followup         Immunizations:     Hep A, adult dose 11/13/2018     zzFluzone pf-quadrivalent 3 and up 12/8/2015     Fluzone (3 + years dose) 11/4/2010     Fluzone (3 + years dose) 1/22/2013     Fluzone pf (3+ years dose) 12/9/2013     Fluzone Quadrivalent (3+ years) 10/23/2018     Adacel (Tdap) 6/12/2012         Allergies:     Last Reviewed on 3/08/2019 03:04 PM by Saul Hayes    Bactrim:        Current Medications:     Last Reviewed on 3/08/2019 03:04 PM by Saul Hayes    Toprol XL 50mg Tablets, Extended Release Take 1 tablet(s) by mouth daily     Valtrex 1gm Tablet 1 bid prn     Metformin HCl 500mg Tablets, Extended Release Take 2 tablet(s) by mouth daily     Cozaar 50mg Tablet Take 1 tablet(s) by mouth daily     Hydrochlorothiazide (HCTZ) 25mg Tablet one a day     Pravastatin 40mg Tablet 1 tab daily     EpiPen Auto-Injector 1:1,000 Solution For Injection As Directed     Coumadin 5mg Tablet 6mg tab on Tues 5mg rest     Benadryl Allergy     Oxycodone/Acetaminophen  5mg/325mg Tablet 1 BID PRN     Vitamin D3 2,000IU Capsules 1 capsule daily         OBJECTIVE:        Vitals:         Current: 3/8/2019 2:31:20 PM    Ht:  5 ft, 6 in;  Wt: 216.6 lbs;  BMI: 35.0    T: 98.7 F (oral);  BP: 147/68 mm Hg (left arm, sitting);  P: 71 bpm (left arm (BP Cuff), sitting);  sCr: 0.67 mg/dL;  GFR: 115.03        Exams:     PHYSICAL EXAM:     GENERAL: vital signs recorded - well developed, well nourished, obese;  no apparent distress;     EYES:  EOMI; PERRLA; normal lids, conjunctiva, and fundoscopic exam;     E/N/T:  normal EACs, TMs, nasal/oral mucosa, teeth, gingiva, and oropharynx;     NECK: carotid exam reveals no bruits;     RESPIRATORY: normal respiratory  rate and pattern with no distress; normal breath sounds with no rales, rhonchi, wheezes or rubs;     CARDIOVASCULAR: normal rate; rhythm is regular;  no systolic murmur; no edema;     GASTROINTESTINAL: nontender, nondistended; no hepatosplenomegaly or masses; no bruits;     MUSCULOSKELETAL:  Normal range of motion, strength and tone;     NEUROLOGIC: sensation intact to monofilament bilaterally;     PSYCHIATRIC:  appropriate affect and demeanor; normal speech pattern; grossly normal memory;         Lab/Test Results:         LABORATORY RESULTS: EKG performed by CR         ASSESSMENT           V72.83   Z01.818  Pre-procedural general physical examination              DDx:     250.00   E11.9  Type 2 diabetes              DDx:         ORDERS:         Radiology/Test Orders:       [id number]  Radiologic exam chest 2 views  (Send-Out)         58176  Electrocardiogram, routine with at least 12 leads; with interpretation and report  (In-House)           Lab Orders:       FUTURE  Future order to be done at patients convenience  (Send-Out)         55888  [facility] - [facility] CBC with 3 part diff  (Send-Out)         19083  DIAB - [facility] CMP A1C LIPID AND MICRO ALBUM CR RATIO: [id number]  (Send-Out)         APPTO  Appointment need  (In-House)                   PLAN:          Pre-procedural general physical examination EKG perfromed today and shows normal sinus rhythm. CXR ordered today and this is normal as well. Pt has mild to moderate cardiovascular risks with history of pulmonary emolisms, diabeted mellitus type 2, and HTN but she appears fit enough for surgery and clearance is provided. All liability is assumed by surgeon. Patient should stop warfarin 5 days prior to surgery.         FOLLOW-UP TESTING #1:    RADIOLOGY:  I have ordered a chest x-ray (PA and lateral) to be done today.            Orders:       [id number]  Radiologic exam chest 2 views  (Send-Out)         98203  Electrocardiogram, routine with at least 12 leads;  with interpretation and report  (In-House)            Type 2 diabetes Pt is due for DM 2 labs which are ordered today and she will RTC tomorrow for fasting labs. She will f/u with PCP in 10-14 days for DM 2.         FOLLOW-UP: Schedule a follow-up appointment in 2 weeks..      FOLLOW-UP TESTING #1: FOLLOW-UP LABORATORY:  Labs to be scheduled in the future include CBC and Diabetes Panel 2;CMP, A1C, Lipid, Microalbumin:Creatinine Ratio.            Orders:       FUTURE  Future order to be done at patients convenience  (Send-Out)         68888  [facility] CBC with 3 part diff  (Send-Out)         [address]27  [address] CMP A1C LIPID AND MICRO ALBUM CR RATIO: [id number]  (Send-Out)         APPTO  Appointment need  (In-House)             Patient Education Handouts:       Diabetes - Create Your Own Plate - [facility]              Patient Recommendations:        For  Type 2 diabetes:     Schedule a follow-up visit in 2 weeks.                APPOINTMENT INFORMATION:        Monday Tuesday Wednesday Thursday Friday Saturday Sunday            Time:___________________AM  PM   Date:_____________________         The following laboratory testing has been ordered: CBC             CHARGE CAPTURE           **Please note: ICD descriptions below are intended for billing purposes only and may not represent clinical diagnoses**        Primary Diagnosis:         V72.83 Pre-procedural general physical examination            Z01.818    Encounter for other preprocedural examination              Orders:          03830   Office/outpatient visit; established patient, level 4  (In-House)             36463   Electrocardiogram, routine with at least 12 leads; with interpretation and report  (In-House)           250.00 Type 2 diabetes            E11.9    Type 2 diabetes mellitus without complications              Orders:          APPTO   Appointment need  (In-House)               ADDENDUMS:      ____________________________________     Addendum: 03/22/2019 10:51 AM - Saul Hayes        Pt counseled to stop warfarin 5 days prior to surgery and then resume warfarin 12 to 24 hours after surgery is over. No need to bridge with lovenox in my opinion.         Addendum: 03/25/2019 08:56 AM - Two, Team        Pt inf./er

## 2021-05-18 NOTE — PROGRESS NOTES
Marilou EdwardsCharis 1965     Office/Outpatient Visit    Visit Date:  10:44 am    Provider: Mary Morales N.P. (Assistant: Gretchen Wren)    Location: [facility]        Electronically signed by Mary Morales N.P. on  2018 11:58:21 AM                             SUBJECTIVE:        CC:     Mrs. Edwards is a 52 year old White female.  Fever, productive cough, body aches, chest congestion; did not take her flu vaccine         HPI:         Upper respiratory illness noted.  These have been present for the past 2 days.  The symptoms include body aches, productive cough and purulent sputum production.  She reports recent exposure to illness from family members.  She has already tried to relieve the symptoms with ibuprofen.  Contact she had was positive for flu A     ROS:     CONSTITUTIONAL:  Positive for fever ( low grade ).      E/N/T:  Positive for sore throat ( acute ).      CARDIOVASCULAR:  Negative for chest pain, palpitations, tachycardia, orthopnea, and edema.      RESPIRATORY:  Negative for frequent wheezing.          PMH/FMH/SH:     Last Reviewed on 2018 11:15 AM by Mary Morales    Past Medical History:                 PAST MEDICAL HISTORY         Pulmonary Embolism: x 2;     Gestational Diabetes     Prothrombin gene mutation (MTHFR) one gene heterozygous/Liden 2     Colon cancer: dx'd in 2004;         GYNECOLOGICAL HISTORY:     miscarriage 1         Surgical History:         Hernia Repair: 2009; Ventral hernia repair;      Appendectomy: ;     Colon Resection: /; oophrectmy/salp;      colostomy reversl 2006; Procedures: colonoscopy 13 normal     Dilation and Curettage: 17; hysterscopy;     [facility]COPY: [facility]         Family History:     Father:  at age 62; Cause of death was MI;  Alcoholism     Mother: Hypertension     Brother(s): 3 brother(s) total; 1 ;   Hypertension;  Drug Abuse ( heroin overdose )     Sister(s): Healthy; 1 sister(s) total;  Colonic Polyps;  Anxiety; Drug Abuse     Paternal Grandfather: Cause of death was MI     Paternal Grandmother: Cause of death was heart/obesity     Maternal Grandfather:  at age 60; Cause of death was MI     Maternal Grandmother:  at age 72; Cause of death was MI;  Type 2 Diabetes         Social History:         Household:  Lives with her father in law.  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          Alcohol: Frequency:    rarely;         Substance Abuse History:     None             Immunizations:     Fluzone pf-quadrivalent 3 and up 2015     Fluzone (3 + years dose) 2010     Fluzone (3 + years dose) 2013     Fluzone pf (3+ years dose) 2013     Adacel (Tdap) 2012         Allergies:     Last Reviewed on 2018 10:45 AM by Gretchen Wren    Bactrim:        Current Medications:     Last Reviewed on 2018 10:51 AM by Gretchen Wren    Coumadin 5mg Tablet 1 tab daily     Coumadin 1mg Tablet 6mg tu,thur 5mg other days     Cozaar 50mg Tablet Take 1 tablet(s) by mouth daily     Hydrochlorothiazide (HCTZ) 25mg Tablet one a day     Metformin HCl 500mg Tablets, Extended Release Take 2 tablet(s) by mouth daily     Pravastatin 40mg Tablet 1 tab daily     Toprol XL 50mg Tablets, Extended Release Take 1 tablet(s) by mouth daily     Valtrex 1gm Tablet 1 bid prn     EpiPen Auto-Injector 1:1,000 Solution For Injection As Directed     Benadryl Allergy     Oxycodone/Acetaminophen  5mg/325mg Tablet 1 BID PRN     Vitamin D3 2,000IU Capsules 1 capsule daily         OBJECTIVE:        Vitals:         Current: 2018 10:47:36 AM    Ht:  5 ft, 6 in;  Wt: 215 lbs;  BMI: 34.7     T: 101.3 F (oral);  BP: 152/86 mm Hg (left arm, sitting);  P: 97 bpm (left arm (BP Cuff), sitting);  sCr: 0.66 mg/dL;  GFR: 117.71        Repeat:     10:58:20 AM     BP:   142/68mm Hg (right arm, sitting)         Exams:     PHYSICAL EXAM:     GENERAL:  well developed and nourished; appropriately groomed; in no apparent distress;     E/N/T: EARS:  normal external auditory canals and tympanic membranes;  grossly normal hearing; NOSE: no sinus tenderness; OROPHARYNX:  normal mucosa, dentition, gingiva, and posterior pharynx;     RESPIRATORY: normal respiratory rate and pattern with no distress; normal breath sounds with no rales, rhonchi, wheezes or rubs;     CARDIOVASCULAR: normal rate; rhythm is regular;  no systolic murmur;     LYMPHATIC: no enlargement of cervical or facial nodes;         Lab/Test Results:             Influenza A:  Positive (02/13/2018),     Influenza B:  Negative (02/13/2018),     Performed by::  sanjay (02/13/2018),             ASSESSMENT           487.1   J10.1  Influenza, with other respiratory manifestation              DDx:     466.0   J20.9  Acute bronchitis              DDx:         ORDERS:         Meds Prescribed:       Tamiflu (Oseltamivir) 75mg Capsules Take 1 capsule(s) by mouth bid for 5 days  #10 (Ten) capsule(s) Refills: 0       Zithromax (Azithromycin) 250mg Tablet 2 tablets on day 1, then 1 tablet on days 2-5.  #6 (Six) tablet(s) Refills: 0         Lab Orders:       [id number]  Infectious agent antigen detection by immunoassay; Influenza  (In-House)         64946  Infectious agent antigen detection by immunoassay; Influenza  (In-House)                   PLAN:          Influenza, with other respiratory manifestation         RECOMMENDATIONS given include: rest, increase oral fluid intake, and reduce fever with acetaminophen or ibuprofen.      FOLLOW-UP: advised to call if there is no improvement within 2 day(s).            Prescriptions:       Tamiflu (Oseltamivir) 75mg Capsules Take 1  capsule(s) by mouth bid for 5 days  #10 (Ten) capsule(s) Refills: 0           Orders:       [id number]  Infectious agent antigen detection by immunoassay; Influenza  (In-House)         07806  Infectious agent antigen detection by immunoassay; Influenza  (In-House)             Patient Education Handouts:       Influenza           Acute bronchitis recheck INR later this week         RECOMMENDATIONS given include: rest, increase oral fluid intake, and reduce fever with acetaminophen or ibuprofen.      FOLLOW-UP: Advised to call if there is no improvement in 4 day(s).            Prescriptions:       Zithromax (Azithromycin) 250mg Tablet 2 tablets on day 1, then 1 tablet on days 2-5.  #6 (Six) tablet(s) Refills: 0             Patient Recommendations:        For  Influenza, with other respiratory manifestation:     Get plenty of rest. Increase oral fluid intake. Reduce fever as needed with acetaminophen or ibuprofen.          For  Acute bronchitis:     Reduce fever as needed with acetaminophen or ibuprofen.              CHARGE CAPTURE           **Please note: ICD descriptions below are intended for billing purposes only and may not represent clinical diagnoses**        Primary Diagnosis:         487.1 Influenza, with other respiratory manifestation            J10.1    Influenza due to other identified influenza virus with other respiratory manifestations              Orders:          20135   Office/outpatient visit; established patient, level 4  (In-House)             91048 -59  Infectious agent antigen detection by immunoassay; Influenza  (In-House)             49039   Infectious agent antigen detection by immunoassay; Influenza  (In-House)           466.0 Acute bronchitis            J20.9    Acute bronchitis, unspecified

## 2021-05-18 NOTE — PROGRESS NOTES
Jerry Marilou M  1965     Office/Outpatient Visit    Visit Date:  11:52 am    Provider: Kat Dhaliwal MD (Assistant: Rose Mercado MA)    Location: [facility]        Electronically signed by Kat Dhaliwal MD on  2020 02:13:20 PM                             Subjective:        CC: MTHFR mutation with h/o PE's    HPI:       Darren has colon cancer in , and she is in remission at this time and is doing great, during that time she had 2 PEs without notable DVTs and she was identified to have a blood clotting disorder MTHFR mutation, so she needs to be on blood thinners for the rest of her life, she is currently on warfarin and recently we have been unable to manage her INRs, so I am discussing with her other treatment options.    ROS:     CONSTITUTIONAL:  Negative for chills, fatigue, fever, and weight change.      CARDIOVASCULAR:  Negative for chest pain, palpitations, tachycardia, orthopnea, and edema.      RESPIRATORY:  Negative for cough, dyspnea, and hemoptysis.      HEMATOLOGIC/LYMPHATIC:  Positive for history of PE on coumadin, interest in changing rx.      INTEGUMENTARY/BREAST:  Positive for place on her back two weeks,  was raised, using topical ATB.      NEUROLOGICAL:  Negative for dizziness, headaches, paresthesias, and weakness.          Past Medical History / Family History / Social History:         Last Reviewed on 2020 12:30 PM by Kat Dhaliwal    Past Medical History:                 PAST MEDICAL HISTORY         Pulmonary Embolism: x 2;     Gestational Diabetes     Prothrombin gene mutation (MTHFR) one gene heterozygous/Liden 2     Colon cancer: dx'd in 2004;         GYNECOLOGICAL HISTORY:     miscarriage 1         Surgical History:         Hernia Repair: 2009; Ventral hernia repair;     Appendectomy: ;     Colon Resection: /; oophrectmy/salp;     colostomy reversl fall ; Procedures: colonoscopy  13 normal     Dilation and Curettage: 17; hysterscopy; Other Surgeries    LiF DIP  index mucous cyst excision  19;     COLONOSCOPY: was last done 17 with normal results [place]         Family History:     Father:  at age 62; Cause of death was MI;  Alcoholism     Mother: Hypertension     Brother(s): 3 brother(s) total; 1 ;  Hypertension;  Drug Abuse ( heroin overdose )     Sister(s): Healthy; 1 sister(s) total;  Colonic Polyps;  Anxiety; Drug Abuse     Paternal Grandfather: Cause of death was MI     Paternal Grandmother: Cause of death was heart/obesity     Maternal Grandfather:  at age 60; Cause of death was MI     Maternal Grandmother:  at age 72; Cause of death was MI;  Type 2 Diabetes         Social History:         Household:  Lives with her father in law.  Occupation: Unemployed     Marital Status:      Children: 3 children         Tobacco/Alcohol/Supplements:     Last Reviewed on 2020 11:58 AM by Rose Mercado    Tobacco: She has a past history of cigarette smoking; quit date:  .          Alcohol: Frequency:    rarely;         Substance Abuse History:     Last Reviewed on 3/08/2019 03:04 PM by Saul Hayes    None         Mental Health History:     Last Reviewed on 3/08/2019 03:04 PM by Saul Hayes        Communicable Diseases (eg STDs):     Last Reviewed on 3/08/2019 03:04 PM by Saul Hayes        Allergies:     Last Reviewed on 7/15/2020 04:20 PM by Virginia Calvo    Bactrim:          Current Medications:     Last Reviewed on 7/15/2020 04:42 PM by Mary Morales    warfarin 5 mg oral tablet [TAKE 6 MG BY MOUTH ONCE DAILY ON  AND 5 MG BY MOUTH ONCE DAILY THE REST OF THE DAYS OF THE WEEK]    hydroCHLOROthiazide 25 mg oral tablet [Take 1 tablet by mouth once daily]    Toprol XL 50 mg oral Tablet, Extended Release 24 hr [Take 1 tablet by mouth once daily]    EpiPen Auto-Injector 1:1,000 Solution For Injection [As  Directed]    metFORMIN 500 mg oral Tablet, Extended Release 24 hr [Take 2 tablets by mouth once daily]    Glucose Reagent Blood Test Strips  Reagent Strips [Check  blood sugars QID daily. Dispense brand covered by insurance.e11.9]    Valtrex 1gm Tablet [1 bid prn]    pravastatin 40 mg oral tablet [Take 1 tablet by mouth once daily]    Cozaar 50 mg oral tablet [TAKE 1 TABLET BY MOUTH ONCE DAILY - DUE FOR APPT]    Vitamin D3 50 mcg (2,000 unit) oral capsule [1 capsule daily]    oxyCODONE-acetaminophen 5-325 mg oral tablet [1 BID PRN]    Benadryl Allergy     alclometasone Dipro 0.05%      Lancet   Lancet [Check  blood sugars once daily. Dispense brand covered by insurance.]    Allergy Relief (fluticasone) 50 mcg/actuation Intranasal Spray, Suspension [inhale 2 spray (50 mcg) in each nostril by intranasal route once daily]        Objective:        Vitals:         Current: 7/16/2020 12:00:29 PM    Ht:  5 ft, 6 in;  Wt: 222.2 lbs;  BMI: 35.9T: 97.5 F (temporal);  BP: 135/71 mm Hg (left arm, sitting);  P: 71 bpm (left arm (BP Cuff), sitting);  sCr: 0.71 mg/dL;  GFR: 107.28        Exams:     PHYSICAL EXAM:     GENERAL:  well developed and nourished; appropriately groomed; in no apparent distress;     EYES: extraocular movements intact; conjunctiva and cornea are normal;     RESPIRATORY: normal respiratory rate and pattern with no distress;     MUSCULOSKELETAL: normal overall tone     NEUROLOGIC: mental status: alert and oriented x 3;     PSYCHIATRIC: appropriate affect and demeanor; normal psychomotor function; normal speech pattern;         Lab/Test Results:         Coumadin (text dose): 2.5mg on thurs, 5mg every other day (07/16/2020),     INR: 1.8 (07/16/2020),     Other: Dr Madhuri santana /queta (07/16/2020),             Assessment:         Z86.718   Personal history of other venous thrombosis and embolism       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          Prescriptions:       [Recorded] rivaroxaban 20 mg oral tablet [take 1 tablet (20 mg) by oral route once daily with the evening meal]       [Refilled] rivaroxaban 20 mg oral tablet [take 1 tablet (20 mg) by oral route once daily with the evening meal], #30 (thirty) tablets, Refills: 11 (eleven)         Acquired coagulation factor deficiency    LABORATORY:  Labs ordered to be performed today include INR.            Orders:       [id number]  PRO-T - [facility] Prothrombin Time PT/INR  (In-House)                  Charge Capture:         Primary Diagnosis:     Z86.718  Personal history of other venous thrombosis and embolism           Orders:      [id number]  Office/outpatient visit; established patient, level 3  (In-House)              D68.4  Acquired coagulation factor deficiency           Orders:      48896  PRO-T - [facility] Prothrombin Time PT/INR  (In-House)

## 2021-05-18 NOTE — PROGRESS NOTES
Marilou Edwards  1965     Office/Outpatient Visit    Visit Date: Thu, Dec 10, 2020 11:39 am    Provider: Mary Morales N.P. (Assistant: Radha Jay MA)    Location: [facility]        Electronically signed by Mary Morales N.P. on  12/10/2020 12:48:50 PM                             Subjective:        CC: Mrs. Edwards is a 55 year old White female.  right leg pain; pt on xarelto instead of warfarin;         HPI:           The symptom began 10 days ago.  Aggravating factors include walking.  Associated symptoms include pain to right lower leg.  Ran into a truck hitch on the back of a person's vehicle while walking out of [place].  Stepped differently 2 days ago and symptoms are worse.  Has felt nauseated with pain when she walks and bears weight on her right leg.    ROS:     CONSTITUTIONAL:  Negative for fever.      CARDIOVASCULAR:  Negative for chest pain, palpitations, tachycardia, orthopnea, and edema.      RESPIRATORY:  Negative for recent cough and dyspnea.      NEUROLOGICAL:  Negative for dizziness, headaches, paresthesias, and weakness.          Past Medical History / Family History / Social History:         Last Reviewed on 12/10/2020 11:56 AM by Mary Morales    Past Medical History:                 PAST MEDICAL HISTORY         Pulmonary Embolism: x 2;     Gestational Diabetes     Prothrombin gene mutation (MTHFR) one gene heterozygous/Liden 2     Colon cancer: dx'd in 2004;         GYNECOLOGICAL HISTORY:     miscarriage 1         Surgical History:         Hernia Repair: 2009; Ventral hernia repair;     Appendectomy: ;     Colon Resection: /; oophrectmy/salp;     colostomy reversl fall ; Procedures: colonoscopy 13 normal     Dilation and Curettage: 17; hysterscopy; Other Surgeries    LiF DIP  index mucous cyst excision  19;     COLONOSCOPY: was last done 17 with normal results [place]         Family History:     Father:   at age 62; Cause of death was MI;  Alcoholism     Mother: Hypertension     Brother(s): 3 brother(s) total; 1 ;  Hypertension;  Drug Abuse ( heroin overdose )     Sister(s): Healthy; 1 sister(s) total;  Colonic Polyps;  Anxiety; Drug Abuse     Paternal Grandfather: Cause of death was MI     Paternal Grandmother: Cause of death was heart/obesity     Maternal Grandfather:  at age 60; Cause of death was MI     Maternal Grandmother:  at age 72; Cause of death was MI;  Type 2 Diabetes         Social History:         Household:  Lives with her father in law.  Occupation: Unemployed     Marital Status:      Children: 3 children         Tobacco/Alcohol/Supplements:     Last Reviewed on 12/10/2020 11:43 AM by Radha Jay    Tobacco: She has a past history of cigarette smoking; quit date:  .          Alcohol: Frequency:    rarely;         Substance Abuse History:     Last Reviewed on 3/08/2019 03:04 PM by Saul Hayes    None         Mental Health History:     Last Reviewed on 3/08/2019 03:04 PM by Saul Hayes        Communicable Diseases (eg STDs):     Last Reviewed on 3/08/2019 03:04 PM by Saul Hayes        Immunizations:     influenza, injectable, quadrivalent, preservative free (FLUZONE QUAD 3548-4716) 10/23/2020    Hep A, adult dose 2018    zzFluzone pf-quadrivalent 3 and up 2015    Fluzone (3 + years dose) 2010    Fluzone (3 + years dose) 2013    Fluzone pf (3+ years dose) 2013    Fluzone Quadrivalent (3+ years) 10/23/2018    Fluzone Quadrivalent (3+ years) 10/18/2019    Adacel (Tdap) 2012        Allergies:     Last Reviewed on 12/10/2020 11:43 AM by Radha Jay    Bactrim:          Current Medications:     Last Reviewed on 12/10/2020 11:43 AM by Radha Jay    hydroCHLOROthiazide 25 mg oral tablet [TAKE 1 TABLET BY MOUTH EVERY DAY]    metoprolol succinate 50 mg oral Tablet, Extended Release 24 hr [TAKE 1 TABLET BY MOUTH EVERY DAY]     EpiPen Auto-Injector 1:1,000 Solution For Injection [As Directed]    metFORMIN 500 mg oral Tablet, Extended Release 24 hr [Take 2 tablets by mouth once daily]    Glucose Reagent Blood Test Strips  Reagent Strips [Check  blood sugars QID daily. Dispense brand covered by insurance.e11.9]    Valtrex 1gm Tablet [1 bid prn]    pravastatin 40 mg oral tablet [Take 1 tablet by mouth once daily]    losartan 50 mg oral tablet [TAKE 1 TABLET BY MOUTH EVERY DAY]    Vitamin D3 50 mcg (2,000 unit) oral capsule [1 capsule daily]    oxyCODONE-acetaminophen 5-325 mg oral tablet [1 BID PRN]    Benadryl Allergy     alclometasone Dipro 0.05%      Lancet   Lancet [Check  blood sugars once daily. Dispense brand covered by insurance.]    Allergy Relief (fluticasone) 50 mcg/actuation Intranasal Spray, Suspension [inhale 2 spray (50 mcg) in each nostril by intranasal route once daily]    rivaroxaban 20 mg oral tablet [take 1 tablet (20 mg) by oral route once daily with the evening meal]        Objective:        Vitals:         Current: 12/10/2020 11:44:39 AM    Ht:  5 ft, 6 in;  Wt: 216.4 lbs;  BMI: 34.9T: 97.4 F (temporal);  BP: 155/66 mm Hg (left arm, sitting);  P: 83 bpm (left arm (BP Cuff), sitting);  sCr: 0.68 mg/dL;  GFR: 110.76        Repeat:     12:3:39 PM  BP:   146/73mm Hg (left arm, sitting, pulse 79)     Exams:     PHYSICAL EXAM:     GENERAL: vital signs recorded - well developed, well nourished;  no apparent distress;     RESPIRATORY: normal respiratory rate and pattern with no distress; normal breath sounds with no rales, rhonchi, wheezes or rubs;     CARDIOVASCULAR: normal rate; rhythm is regular;  no systolic murmur; no edema;     BREAST/INTEGUMENT: puncuture wound to anterior lower right mid leg, skin dry, no exudate; faint bruise present below puncture wound     MUSCULOSKELETAL: legs symmetrical tender to palpation of right lower anteior leg     PSYCHIATRIC:  appropriate affect and demeanor; normal speech pattern; grossly  normal memory;         Procedures:     Encounter for immunizationtolerated well, no reaction./bb            Assessment:         S80.921A   Unspecified superficial injury of right lower leg, initial encounter       I10   Essential (primary) hypertension       Z23   Encounter for immunization           ORDERS:         Radiology/Test Orders:       [id number]  Right radiologic examination; tibia and fibula, 2 views  (Send-Out)              Procedures Ordered:       [id number]  Immunization administration; one vaccine  (In-House)              Other Orders:       [id number]  Td vaccine prsrv free 7 yrs or older for IM use  (In-House)                      Plan:         Unspecified superficial injury of right lower leg, initial encounterwill update with adacel today        RADIOLOGY:  I have ordered a Right Tibia and fibula xray to be done today.      RECOMMENDATIONS given include: rest, elevate leg.      FOLLOW-UP:.  :for pending x-ray results           Orders:       [id number]  Right radiologic examination; tibia and fibula, 2 views  (Send-Out)              Essential (primary) hypertension        FOLLOW-UP: if bp stays elevated         Encounter for immunization          Immunizations:       07707  Immunization administration; one vaccine  (In-House)            50869  Td vaccine prsrv free 7 yrs or older for IM use  (In-House)                Dose (ml): 0.5  Site: left deltoid  Route: intramuscular  Administered by: Radha Morales          : [employer]  Lot #: [id number]  Exp: 04/17/2022          NDC: [address]-85            Patient Recommendations:        For  Unspecified superficial injury of right lower leg, initial encounter:                    APPOINTMENT INFORMATION:        Monday Tuesday Wednesday Thursday Friday Saturday Sunday            Time:___________________AM  PM   Date:_____________________             Charge Capture:         Primary Diagnosis:     S80.921A  Unspecified superficial injury of right  lower leg, initial encounter           Orders:      [id number]  Office/outpatient visit; established patient, level 3  (In-House)              I10  Essential (primary) hypertension     Z23  Encounter for immunization           Orders:      85531  Immunization administration; one vaccine  (In-House)            11575  Td vaccine prsrv free 7 yrs or older for IM use  (In-House)

## 2021-05-18 NOTE — PROGRESS NOTES
Marilou EdwardsCharis 1965     Office/Outpatient Visit    Visit Date: Tue, Mar 19, 2019 09:41 am    Provider: Mary Morales N.P. (Assistant: Lisa Aguilar LPN)    Location: [facility]        Electronically signed by Mary Morales N.P. on  2019 12:42:36 PM                             SUBJECTIVE:        CC:     Mrs. Edwards is a 53 year old White female.  This is a follow-up visit.  was seen by Dr. Hayes for surgical release, he informed pt she needed a follow up.;         HPI:         Patient to be evaluated for type 2 diabetes.  Current meds include an oral hypoglycemic ( Glucophage XR ( 500mg bid ) ).  Most recent lab results include Systolic BP:  149 (2019), Diastolic BP:  78 (2019), Hemoglobin A1c:  6.5 (%) (2019), LDL:  108 (mg/dL) (2019), HDL:  67 (mg/dL) (2019), Triglycerides:  124 (mg/dL) (2019), Microalbuminuria:  13.7 (mg/g creat) (2019).      ROS:     CONSTITUTIONAL:  Negative for chills, fatigue, fever, and weight change.      CARDIOVASCULAR:  Negative for chest pain, palpitations, tachycardia, orthopnea, and edema.      RESPIRATORY:  Positive for hx of PE on coumadin.      MUSCULOSKELETAL:  Positive for cyst on left finger, surgery date is pending, she was in recently for labs and surgical clearance.      NEUROLOGICAL:  Negative for dizziness, headaches, paresthesias, and weakness.          PMH/FMH/SH:     Last Reviewed on 3/19/2019 11:34 AM by Mary Morales    Past Medical History:                 PAST MEDICAL HISTORY         Pulmonary Embolism: x 2;     Gestational Diabetes     Prothrombin gene mutation (MTHFR) one gene heterozygous/Liden 2     Colon cancer: dx'd in 2004;         GYNECOLOGICAL HISTORY:     miscarriage 1         Surgical History:         Hernia Repair: 2009; Ventral hernia repair;      Appendectomy: ;     Colon Resection: /; oophrectmy/salp;      colostomy reversl fall ; Procedures:  colonoscopy 13 normal     Dilation and Curettage: 17; hysterscopy;     COLONOSCOPY: was last done 17 with normal results [place]         Family History:     Father:  at age 62; Cause of death was MI;  Alcoholism     Mother: Hypertension     Brother(s): 3 brother(s) total; 1 ;  Hypertension;  Drug Abuse ( heroin overdose )     Sister(s): Healthy; 1 sister(s) total;  Colonic Polyps;  Anxiety; Drug Abuse     Paternal Grandfather: Cause of death was MI     Paternal Grandmother: Cause of death was heart/obesity     Maternal Grandfather:  at age 60; Cause of death was MI     Maternal Grandmother:  at age 72; Cause of death was MI;  Type 2 Diabetes         Social History:         Household:  Lives with her father in law.  Occupation: Unemployed     Marital Status:      Children: 3 children         Tobacco/Alcohol/Supplements:     Last Reviewed on 3/19/2019 09:44 AM by Lisa Aguilar    Tobacco: She has a past history of cigarette smoking; quit date:  .          Alcohol: Frequency:    rarely;         Substance Abuse History:     Last Reviewed on 3/08/2019 03:04 PM by Saul Hayes    None         Mental Health History:     Last Reviewed on 3/08/2019 03:04 PM by Saul Hayes        Communicable Diseases (eg STDs):     Last Reviewed on 3/08/2019 03:04 PM by Saul Hayes            Immunizations:     Hep A, adult dose 2018     zzFluzone pf-quadrivalent 3 and up 2015     Fluzone (3 + years dose) 2010     Fluzone (3 + years dose) 2013     Fluzone pf (3+ years dose) 2013     Fluzone Quadrivalent (3+ years) 10/23/2018     Adacel (Tdap) 2012         Allergies:     Last Reviewed on 3/19/2019 09:44 AM by Lisa Aguilar    Bactrim:        Current Medications:     Last Reviewed on 3/19/2019 09:51 AM by Lisa Aguilar    Toprol XL 50mg Tablets, Extended Release Take 1 tablet(s) by mouth daily     Valtrex 1gm Tablet 1 bid prn     Metformin HCl 500mg Tablets,  Extended Release Take 2 tablet(s) by mouth daily     Hydrochlorothiazide (HCTZ) 25mg Tablet one a day     Pravastatin 40mg Tablet 1 tab daily     EpiPen Auto-Injector 1:1,000 Solution For Injection As Directed     Coumadin 5mg Tablet 6mg tab on Tues 5mg rest     Benadryl Allergy     Oxycodone/Acetaminophen  5mg/325mg Tablet 1 BID PRN     Vitamin D3 2,000IU Capsules 1 capsule daily     alclometasone Dipro 0.05%         OBJECTIVE:        Vitals:         Current: 3/19/2019 9:44:19 AM    Ht:  5 ft, 6 in;  Wt: 219.6 lbs;  BMI: 35.4    T: 98.1 F (oral);  BP: 157/75 mm Hg (right arm, sitting);  P: 72 bpm (right arm (BP Cuff), sitting);  sCr: 0.63 mg/dL;  GFR: 123.05        Repeat:     9:54:26 AM     BP:   149/78mm Hg (left arm, sitting)     9:54:44 AM     P:   75bpm (left arm (BP Cuff), sitting)         Exams:     PHYSICAL EXAM:     GENERAL: vital signs recorded - well developed, well nourished;  no apparent distress;     RESPIRATORY: normal respiratory rate and pattern with no distress; normal breath sounds with no rales, rhonchi, wheezes or rubs;     CARDIOVASCULAR: normal rate; rhythm is regular;  no systolic murmur;     PSYCHIATRIC:  appropriate affect and demeanor; normal speech pattern; grossly normal memory;         ASSESSMENT           250.00   E11.9  Type 2 diabetes              DDx:     729.5   M79.645  Finger pain              DDx:     401.1   I10  Hypertension              DDx:     V12.51   Z86.718  History of venous thrombosis and embolism              DDx:         PLAN:          Type 2 diabetes reviewed labs with patient, continue her efforts with diet, to take her metformin as prescribed         FOLLOW-UP: Schedule a follow-up visit in 6 months.           Finger pain needs rx for her heparin, she has to stop coumadin 5 days before surgery, reviewed her CXR and EKG, she contacted the hand surgeon and her pre surgery appt is 4-9-19 at 9:15 and her surgery is scheduled for 4-11-19, they have not received  clearance from our office, will check with Dr. Hayes who did her pre op physical          Hypertension has not been taking ARB due to the recall, getting her new rx today         FOLLOW-UP: recheck BP          History of venous thrombosis and embolism she asked about her rx for heparin to use before her surgery, checking with Dr. Hayes             Patient Recommendations:        For  Type 2 diabetes:     Schedule a follow-up visit in 6 months.              CHARGE CAPTURE           **Please note: ICD descriptions below are intended for billing purposes only and may not represent clinical diagnoses**        Primary Diagnosis:         250.00 Type 2 diabetes            E11.9    Type 2 diabetes mellitus without complications              Orders:          52246   Office/outpatient visit; established patient, level 3  (In-House)           729.5 Finger pain            M79.645    Pain in left finger(s)    401.1 Hypertension            I10    Essential (primary) hypertension    V12.51 History of venous thrombosis and embolism            Z86.718    Personal history of other venous thrombosis and embolism

## 2021-05-18 NOTE — PROGRESS NOTES
Marilou Edwards 1965     Office/Outpatient Visit    Visit Date: Tue, Oct 8, 2019 02:45 pm    Provider: Mary Morales N.P. (Assistant: Virginia Calvo MA)    Location: [facility]        Electronically signed by Mary Morales N.P. on  10/08/2019 05:19:10 PM                             SUBJECTIVE:        CC: COUMADIN 6MG TUESDAY, 5MG ALL OTHER DAYS     Mrs. Edwards is a 54 year old White female.  This is a follow-up visit.  check up; needs refills         HPI:         Additionally, she presents with history of type 2 diabetes.  current meds include an oral hypoglycemic ( Glucophage XR ( 500mg bid ) ).  Most recent lab results include Hemoglobin A1c:  6.5 (%) (2019), LDL:  108 (mg/dL) (2019), HDL:  67 (mg/dL) (2019), Triglycerides:  124 (mg/dL) (2019), Microalbuminuria:  13.7 (mg/g creat) (2019), Dilated Eye Exam by date:  10/09/2018 (10/29/2018), Foot Exam (Annual):  2018 (2018).      ROS:     CONSTITUTIONAL:  Negative for chills, fatigue, fever, and weight change.      CARDIOVASCULAR:  Negative for chest pain, palpitations, tachycardia, orthopnea, and edema.      RESPIRATORY:  Negative for cough, dyspnea, and hemoptysis.      MUSCULOSKELETAL:  Positive for arthralgias (diffuse / flares and has had issues since her chemo years ago).      NEUROLOGICAL:  Positive for tingles in her extremities at time and has since her chemo years ago.          PMH/FMH/SH:     Last Reviewed on 10/08/2019 03:05 PM by Mary Morales    Past Medical History:                 PAST MEDICAL HISTORY         Pulmonary Embolism: x 2;     Gestational Diabetes     Prothrombin gene mutation (MTHFR) one gene heterozygous/Liden 2     Colon cancer: dx'd in 2004;         GYNECOLOGICAL HISTORY:     miscarriage 1         Surgical History:         Hernia Repair: 2009; Ventral hernia repair;      Appendectomy: ;     Colon Resection: /; oophrectmy/salp;       colostomy reversl fall ; Procedures: colonoscopy 13 normal     Dilation and Curettage: 17; hysterscopy; Other Surgeries    LiF DIP  index mucous cyst excision  19;     COLONOSCOPY: was last done 17 with normal results [place]         Family History:     Father:  at age 62; Cause of death was MI;  Alcoholism     Mother: Hypertension     Brother(s): 3 brother(s) total; 1 ;  Hypertension;  Drug Abuse ( heroin overdose )     Sister(s): Healthy; 1 sister(s) total;  Colonic Polyps;  Anxiety; Drug Abuse     Paternal Grandfather: Cause of death was MI     Paternal Grandmother: Cause of death was heart/obesity     Maternal Grandfather:  at age 60; Cause of death was MI     Maternal Grandmother:  at age 72; Cause of death was MI;  Type 2 Diabetes         Social History:         Household:  Lives with her father in law.  Occupation: Unemployed     Marital Status:      Children: 3 children         Tobacco/Alcohol/Supplements:     Last Reviewed on 10/08/2019 02:50 PM by Virginia Calvo    Tobacco: She has a past history of cigarette smoking; quit date:  .          Alcohol: Frequency:    rarely;         Substance Abuse History:     Last Reviewed on 3/08/2019 03:04 PM by Saul Hayes    None         Mental Health History:     Last Reviewed on 3/08/2019 03:04 PM by Saul Hayes        Communicable Diseases (eg STDs):     Last Reviewed on 3/08/2019 03:04 PM by Saul Hayes            Immunizations:     Hep A, adult dose 2018     zzFluzone pf-quadrivalent 3 and up 2015     Fluzone (3 + years dose) 2010     Fluzone (3 + years dose) 2013     Fluzone pf (3+ years dose) 2013     Fluzone Quadrivalent (3+ years) 10/23/2018     Adacel (Tdap) 2012         Allergies:     Last Reviewed on 10/08/2019 02:50 PM by Virginia Calvo    Bactrim:        Current Medications:     Last Reviewed on 10/08/2019 02:50 PM by Virginia Calvo    Cozaar 50mg  Tablet Take 1 tablet(s) by mouth daily     Coumadin 5mg Tablet 6mg tab on Tues 5mg rest     Toprol XL 50mg Tablets, Extended Release Take 1 tablet(s) by mouth daily     Hydrochlorothiazide (HCTZ) 25mg Tablet one a day     Metformin HCl 500mg Tablets, Extended Release Take 2 tablet(s) by mouth daily     Pravastatin 40mg Tablet 1 tab daily     Coumadin 1mg Tablet 6mg on Tuesday and 5 mg rest of the week     Glucose Reagent Blood Test Strips  Reagent Strips Check  blood sugars QID daily. Dispense brand covered by insurance.e11.9     Lancet   Lancet Check  blood sugars once daily. Dispense brand covered by insurance.     Valtrex 1gm Tablet 1 bid prn     EpiPen Auto-Injector 1:1,000 Solution For Injection As Directed     alclometasone Dipro 0.05%     Benadryl Allergy     Oxycodone/Acetaminophen  5mg/325mg Tablet 1 BID PRN     Vitamin D3 2,000IU Capsules 1 capsule daily         OBJECTIVE:        Vitals:         Current: 10/8/2019 2:53:54 PM    Ht:  5 ft, 6 in;  Wt: 206.4 lbs;  BMI: 33.3    T: 98.3 F (oral);  BP: 140/73 mm Hg (right arm, sitting);  P: 73 bpm (right arm (BP Cuff), sitting);  sCr: 0.63 mg/dL;  GFR: 118.51        Exams:     PHYSICAL EXAM:     GENERAL: vital signs recorded - well developed, well nourished;  no apparent distress;     NECK: carotid exam reveals no bruits;     RESPIRATORY: normal respiratory rate and pattern with no distress; normal breath sounds with no rales, rhonchi, wheezes or rubs;     CARDIOVASCULAR: normal rate; rhythm is regular;  no systolic murmur;    Peripheral Pulses: posterior tibial: equal bilaterally;  no edema;     BREAST/INTEGUMENT: skin of feet and toenails intact;     NEUROLOGIC: sensation intact to monofilament bilaterally;     PSYCHIATRIC:  appropriate affect and demeanor; normal speech pattern; grossly normal memory;         ASSESSMENT           V79.0   Z13.31  Screening for depression              DDx:     250.00   E11.9  Type 2 diabetes              DDx:     401.1   I10   Hypertension              DDx:     V12.51   Z86.718  History of venous thrombosis and embolism              DDx:     272.0   E78.5  Hypercholesterolemia              DDx:         ORDERS:         Meds Prescribed:       Refill of: Hydrochlorothiazide (HCTZ) 25mg Tablet one a day  #90 (Ninety) tablet(s) Refills: 0       Refill of: Metformin HCl 500mg Tablets, Extended Release Take 2 tablet(s) by mouth daily  #180 (One [place] [address]) tablet(s) Refills: 0       Refill of: Coumadin (Warfarin Sodium) 5mg Tablet 6mg tab on Tues 5mg rest  #90 (Ninety) tablet(s) Refills: 0       Refill of: Coumadin (Warfarin Sodium) 1mg Tablet 6mg on Tuesday and 5 mg rest of the week  #30 (Thirty) tablet(s) Refills: 0       Refill of: Cozaar (Losartan) 50mg Tablet Take 1 tablet(s) by mouth daily  #90 (Ninety) tablet(s) Refills: 0       Refill of: Toprol XL (Metoprolol Succinate) 50mg Tablets, Extended Release Take 1 tablet(s) by mouth daily  #90 (Ninety) tablet(s) Refills: 0       Refill of: Pravastatin 40mg Tablet 1 tab daily  #90 (Ninety) tablet(s) Refills: 0         Lab Orders:       FUTURE  Future order to be done at patients convenience  (Send-Out)         [id number]  [address] LIPID,CMP, A1C: 68232, [address]9839, [address]  (Send-Out)           Other Orders:         Depression screen negative  (In-House)                   PLAN:          Screening for depression heading to [place] today     MIPS PHQ-9 Depression Screening: Completed form scanned and in chart; Total Score 5; Negative Depression Screen           Orders:         Depression screen negative  (In-House)            Type 2 diabetes updated diabetes flow sheet, she said she goes to either [facility] or [facility] and said she brought us a report from there last year, record was not found, to get us a copy again         FOLLOW-UP TESTING #1: FOLLOW-UP LABORATORY:  Labs to be scheduled in the future include Diabetes Panel 1; CMP, Lipid, A1C.            Prescriptions:       Refill  of: Metformin HCl 500mg Tablets, Extended Release Take 2 tablet(s) by mouth daily  #180 (One [place] [address]) tablet(s) Refills: 0           Orders:       FUTURE  Future order to be done at patients convenience  (Send-Out)         [id number]  [address] LIPID,CMP, A1C: [address]34, [address]69, [address]  (Send-Out)            Hypertension           Prescriptions:       Refill of: Hydrochlorothiazide (HCTZ) 25mg Tablet one a day  #90 (Ninety) tablet(s) Refills: 0       Refill of: Cozaar (Losartan) 50mg Tablet Take 1 tablet(s) by mouth daily  #90 (Ninety) tablet(s) Refills: 0       Refill of: Toprol XL (Metoprolol Succinate) 50mg Tablets, Extended Release Take 1 tablet(s) by mouth daily  #90 (Ninety) tablet(s) Refills: 0          History of venous thrombosis and embolism will return next week for her INR, will get her flu vaccine later           Prescriptions:       Refill of: Coumadin (Warfarin Sodium) 5mg Tablet 6mg tab on Tues 5mg rest  #90 (Ninety) tablet(s) Refills: 0       Refill of: Coumadin (Warfarin Sodium) 1mg Tablet 6mg on Tuesday and 5 mg rest of the week  #30 (Thirty) tablet(s) Refills: 0          Hypercholesterolemia           Prescriptions:       Refill of: Pravastatin 40mg Tablet 1 tab daily  #90 (Ninety) tablet(s) Refills: 0             Patient Recommendations:        For  Type 2 diabetes:             The following laboratory testing has been ordered:             CHARGE CAPTURE           **Please note: ICD descriptions below are intended for billing purposes only and may not represent clinical diagnoses**        Primary Diagnosis:         V79.0 Screening for depression            Z13.31    Encounter for screening for depression              Orders:          [id number]   Office/outpatient visit; established patient, level 4  (In-House)                Depression screen negative  (In-House)           250.00 Type 2 diabetes            E11.9    Type 2 diabetes mellitus without complications    401.1 Hypertension             I10    Essential (primary) hypertension    V12.51 History of venous thrombosis and embolism            Z86.718    Personal history of other venous thrombosis and embolism    272.0 Hypercholesterolemia            E78.5    Hyperlipidemia, unspecified

## 2021-05-18 NOTE — PROGRESS NOTES
Marilou Edwards  1965     Office/Outpatient Visit    Visit Date:  02:11 pm    Provider: Mike Antunez MD (Assistant: Cammie Arias,  )    Location: [facility]        Electronically signed by Mike Antunez MD on  2021 08:40:42 AM                             Subjective:        CC: sinus infection    HPI:           Patient complains of acute maxillary sinusitis, unspecified.  This has been a problem for the past 10 days.  This is an acute problem without chronic or recurrent episodes.  Her primary symptoms include facial pressure, nasal congestion and post-nasal drip.  She denies chest congestion, cough or rhinorrhea.  She has already tried an antihistamine.      ROS:     CONSTITUTIONAL:  Negative for chills and fever.      CARDIOVASCULAR:  Negative for chest pain and palpitations.      GASTROINTESTINAL:  Negative for abdominal pain, nausea and vomiting.      INTEGUMENTARY/BREAST:  Negative for atypical mole(s) and rash.          Past Medical History / Family History / Social History:         Last Reviewed on 2021 02:45 PM by Mike Antunez    Past Medical History:                 PAST MEDICAL HISTORY         Pulmonary Embolism: x 2;     Gestational Diabetes     Prothrombin gene mutation (MTHFR) one gene heterozygous/Liden 2     Colon cancer: dx'd in 2004;         GYNECOLOGICAL HISTORY:     miscarriage 1         Surgical History:         Hernia Repair: 2009; Ventral hernia repair;     Appendectomy: ;     Colon Resection: /; oophrectmy/salp;     colostomy reversl fall ; Procedures: colonoscopy 13 normal     Dilation and Curettage: 17; hysterscopy; Other Surgeries    LiF DIP  index mucous cyst excision  19;     COLONOSCOPY: was last done 17 with normal results [place]         Family History:     Father:  at age 62; Cause of death was MI;  Alcoholism     Mother: Hypertension     Brother(s):  3 brother(s) total; 1 ;  Hypertension;  Drug Abuse ( heroin overdose )     Sister(s): Healthy; 1 sister(s) total;  Colonic Polyps;  Anxiety; Drug Abuse     Paternal Grandfather: Cause of death was MI     Paternal Grandmother: Cause of death was heart/obesity     Maternal Grandfather:  at age 60; Cause of death was MI     Maternal Grandmother:  at age 72; Cause of death was MI;  Type 2 Diabetes         Social History:         Household:  Lives with her father in law.  Occupation: Unemployed     Marital Status:      Children: 3 children         Tobacco/Alcohol/Supplements:     Last Reviewed on 2021 02:45 PM by Mike Antunez    Tobacco: She has a past history of cigarette smoking; quit date:  .          Alcohol: Frequency:    rarely;         Substance Abuse History:     Last Reviewed on 2021 02:45 PM by Mike Antunez    None         Mental Health History:     Last Reviewed on 2021 02:45 PM by Mike Antunez        Communicable Diseases (eg STDs):     Last Reviewed on 2021 02:45 PM by Mike Antunez        Current Problems:     Last Reviewed on 2021 02:45 PM by Mike Antunez    Essential (primary) hypertension    Pain in left finger(s)    Personal history of other venous thrombosis and embolism    Type 2 diabetes mellitus without complications    Hyperlipidemia, unspecified    Long term (current) use of anticoagulants    Other hereditary and idiopathic neuropathies    Anxiety disorder, unspecified    Other fatigue    Other specified disorders of nose and nasal sinuses    Acquired coagulation factor deficiency    Encounter for immunization    Unspecified superficial injury of right lower leg, initial encounter    Acute maxillary sinusitis, unspecified        Immunizations:     influenza, injectable, quadrivalent, preservative free (FLUZONE QUAD 6506-0366) 10/23/2020    Td (adult), 5 Lf tetanus toxoid, preservative free,  adsorbed (TENIVAC) 12/10/2020    Hep A, adult dose 11/13/2018    zzFluzone pf-quadrivalent 3 and up 12/8/2015    Fluzone (3 + years dose) 11/4/2010    Fluzone (3 + years dose) 1/22/2013    Fluzone pf (3+ years dose) 12/9/2013    Fluzone Quadrivalent (3+ years) 10/23/2018    Fluzone Quadrivalent (3+ years) 10/18/2019    Adacel (Tdap) 6/12/2012        Allergies:     Last Reviewed on 2/23/2021 02:45 PM by Mike Antunez    Bactrim:          Current Medications:     Last Reviewed on 2/23/2021 02:45 PM by Mike Antunez    metoprolol succinate 50 mg oral Tablet, Extended Release 24 hr [TAKE 1 TABLET BY MOUTH EVERY DAY]    hydroCHLOROthiazide 25 mg oral tablet [TAKE 1 TABLET BY MOUTH EVERY DAY]    EpiPen Auto-Injector 1:1,000 Solution For Injection [As Directed]    metFORMIN 500 mg oral Tablet, Extended Release 24 hr [TAKE 2 TABLETS BY MOUTH EVERY DAY]    Glucose Reagent Blood Test Strips  Reagent Strips [Check  blood sugars QID daily. Dispense brand covered by insurance.e11.9]    Valtrex 1gm Tablet [1 bid prn]    pravastatin 40 mg oral tablet [TAKE 1 TABLET BY MOUTH EVERY DAY]    losartan 50 mg oral tablet [TAKE 1 TABLET BY MOUTH EVERY DAY]    Vitamin D3 50 mcg (2,000 unit) oral capsule [1 capsule daily]    oxyCODONE-acetaminophen 5-325 mg oral tablet [1 BID PRN]    Benadryl Allergy     alclometasone Dipro 0.05%      Lancet   Lancet [Check  blood sugars once daily. Dispense brand covered by insurance.]    Allergy Relief (fluticasone) 50 mcg/actuation Intranasal Spray, Suspension [inhale 2 spray (50 mcg) in each nostril by intranasal route once daily]    rivaroxaban 20 mg oral tablet [take 1 tablet (20 mg) by oral route once daily with the evening meal]        Objective:        Vitals:         Current: 2/23/2021 2:13:45 PM    Ht:  5 ft, 6 in;  Wt: 214.6 lbs;  BMI: 34.6T: 97.6 F (temporal);  BP: 160/70 mm Hg (right arm, sitting);  P: 98 bpm (right arm (BP Cuff), sitting);  sCr: 0.68 mg/dL;  GFR: 110.37O2  Sat: 98 % (room air)        Exams:     PHYSICAL EXAM:     GENERAL: vital signs recorded - well developed, well nourished;  no apparent distress;     EYES: extraocular movements intact; conjunctiva and cornea are normal; PERRL;     E/N/T: EARS:  normal external auditory canals and tympanic membranes;  grossly normal hearing; NOSE: bilateral maxillary sinus tenderness present; OROPHARYNX:  normal mucosa, dentition, gingiva, and posterior pharynx;     NECK: range of motion is normal; thyroid is non-palpable;     RESPIRATORY: normal respiratory rate and pattern with no distress; normal breath sounds with no rales, rhonchi, wheezes or rubs;     CARDIOVASCULAR: normal rate; rhythm is regular;  no systolic murmur; no edema;     GASTROINTESTINAL: nontender; normal bowel sounds; no organomegaly;     LYMPHATIC: no enlargement of cervical or facial nodes; no supraclavicular nodes;     BREAST/INTEGUMENT: SKIN: no significant rashes or lesions; no suspicious moles;     NEUROLOGIC: mental status: alert and oriented x 3; cranial nerves II-XII grossly intact;     PSYCHIATRIC: appropriate affect and demeanor; normal psychomotor function;         Assessment:         J01.00   Acute maxillary sinusitis, unspecified           ORDERS:         Meds Prescribed:       [New Rx] Augmentin 875-125 mg oral tablet [take 1 tablet by oral route every 12 hours], #20 (twenty) tablets, Refills: 0 (zero)                 Plan:         Acute maxillary sinusitis, unspecified        RECOMMENDATIONS given include: Today, we have reviewed Marilou's care.  She has been struggling with her sinuses the last 10 days.  I'm going to cover her for this as noted.  We discussed COVID testing, but she declines at this time.  I have a relatively low suspicion anyway..            Prescriptions:       [New Rx] Augmentin 875-125 mg oral tablet [take 1 tablet by oral route every 12 hours], #20 (twenty) tablets, Refills: 0 (zero)             Charge Capture:          Primary Diagnosis:     J01.00  Acute maxillary sinusitis, unspecified           Orders:      [id number]  Office/outpatient visit; established patient, level 3  (In-House)

## 2021-06-08 ENCOUNTER — LAB (OUTPATIENT)
Dept: LAB | Facility: HOSPITAL | Age: 56
End: 2021-06-08

## 2021-06-08 ENCOUNTER — OFFICE VISIT (OUTPATIENT)
Dept: FAMILY MEDICINE CLINIC | Age: 56
End: 2021-06-08

## 2021-06-08 VITALS
BODY MASS INDEX: 34.05 KG/M2 | TEMPERATURE: 97.7 F | WEIGHT: 204.4 LBS | SYSTOLIC BLOOD PRESSURE: 128 MMHG | HEIGHT: 65 IN | HEART RATE: 63 BPM | DIASTOLIC BLOOD PRESSURE: 73 MMHG

## 2021-06-08 DIAGNOSIS — I10 ESSENTIAL HYPERTENSION: ICD-10-CM

## 2021-06-08 DIAGNOSIS — E11.9 TYPE 2 DIABETES MELLITUS WITHOUT COMPLICATION, WITHOUT LONG-TERM CURRENT USE OF INSULIN (HCC): ICD-10-CM

## 2021-06-08 DIAGNOSIS — E78.49 OTHER HYPERLIPIDEMIA: ICD-10-CM

## 2021-06-08 DIAGNOSIS — E11.9 TYPE 2 DIABETES MELLITUS WITHOUT COMPLICATION, WITHOUT LONG-TERM CURRENT USE OF INSULIN (HCC): Primary | ICD-10-CM

## 2021-06-08 LAB
ALBUMIN SERPL-MCNC: 4.5 G/DL (ref 3.5–5.2)
ALBUMIN UR-MCNC: 2.6 MG/DL
ALBUMIN/GLOB SERPL: 1.7 G/DL
ALP SERPL-CCNC: 109 U/L (ref 39–117)
ALT SERPL W P-5'-P-CCNC: 26 U/L (ref 1–33)
ANION GAP SERPL CALCULATED.3IONS-SCNC: 12 MMOL/L (ref 5–15)
AST SERPL-CCNC: 18 U/L (ref 1–32)
BILIRUB SERPL-MCNC: 0.3 MG/DL (ref 0–1.2)
BUN SERPL-MCNC: 12 MG/DL (ref 6–20)
BUN/CREAT SERPL: 17.9 (ref 7–25)
CALCIUM SPEC-SCNC: 9.6 MG/DL (ref 8.6–10.5)
CHLORIDE SERPL-SCNC: 101 MMOL/L (ref 98–107)
CHOLEST SERPL-MCNC: 205 MG/DL (ref 0–200)
CO2 SERPL-SCNC: 26 MMOL/L (ref 22–29)
CREAT SERPL-MCNC: 0.67 MG/DL (ref 0.57–1)
CREAT UR-MCNC: 285.8 MG/DL
GFR SERPL CREATININE-BSD FRML MDRD: 91 ML/MIN/1.73
GLOBULIN UR ELPH-MCNC: 2.6 GM/DL
GLUCOSE SERPL-MCNC: 154 MG/DL (ref 65–99)
HBA1C MFR BLD: 6.9 % (ref 4.8–5.6)
HDLC SERPL-MCNC: 60 MG/DL (ref 40–60)
LDLC SERPL CALC-MCNC: 124 MG/DL (ref 0–100)
LDLC/HDLC SERPL: 2.02 {RATIO}
MICROALBUMIN/CREAT UR: 9.1 MG/G
POTASSIUM SERPL-SCNC: 4.1 MMOL/L (ref 3.5–5.2)
PROT SERPL-MCNC: 7.1 G/DL (ref 6–8.5)
SODIUM SERPL-SCNC: 139 MMOL/L (ref 136–145)
TRIGL SERPL-MCNC: 118 MG/DL (ref 0–150)
VLDLC SERPL-MCNC: 21 MG/DL (ref 5–40)

## 2021-06-08 PROCEDURE — 82043 UR ALBUMIN QUANTITATIVE: CPT

## 2021-06-08 PROCEDURE — 80061 LIPID PANEL: CPT

## 2021-06-08 PROCEDURE — 36415 COLL VENOUS BLD VENIPUNCTURE: CPT

## 2021-06-08 PROCEDURE — 99213 OFFICE O/P EST LOW 20 MIN: CPT | Performed by: NURSE PRACTITIONER

## 2021-06-08 PROCEDURE — 83036 HEMOGLOBIN GLYCOSYLATED A1C: CPT

## 2021-06-08 PROCEDURE — 82570 ASSAY OF URINE CREATININE: CPT

## 2021-06-08 PROCEDURE — 80053 COMPREHEN METABOLIC PANEL: CPT

## 2021-06-08 RX ORDER — RIVAROXABAN 20 MG/1
20 TABLET, FILM COATED ORAL DAILY
COMMUNITY
Start: 2021-04-19 | End: 2021-07-20 | Stop reason: SDUPTHER

## 2021-06-08 RX ORDER — PRAVASTATIN SODIUM 40 MG
TABLET ORAL
COMMUNITY
Start: 2021-04-16 | End: 2021-06-08 | Stop reason: SDUPTHER

## 2021-06-08 RX ORDER — HYDROCHLOROTHIAZIDE 25 MG/1
TABLET ORAL
COMMUNITY
Start: 2021-04-16 | End: 2021-06-08 | Stop reason: SDUPTHER

## 2021-06-08 RX ORDER — METFORMIN HYDROCHLORIDE 500 MG/1
500 TABLET, EXTENDED RELEASE ORAL 2 TIMES DAILY WITH MEALS
Qty: 180 TABLET | Refills: 1 | Status: SHIPPED | OUTPATIENT
Start: 2021-06-08 | End: 2021-12-07

## 2021-06-08 RX ORDER — METFORMIN HYDROCHLORIDE 500 MG/1
TABLET, EXTENDED RELEASE ORAL
COMMUNITY
Start: 2021-04-16 | End: 2021-06-08 | Stop reason: SDUPTHER

## 2021-06-08 RX ORDER — MELATONIN
2000 DAILY
COMMUNITY

## 2021-06-08 RX ORDER — LOSARTAN POTASSIUM 50 MG/1
TABLET ORAL
COMMUNITY
Start: 2021-04-16 | End: 2021-06-08 | Stop reason: SDUPTHER

## 2021-06-08 RX ORDER — LOSARTAN POTASSIUM 50 MG/1
50 TABLET ORAL DAILY
Qty: 90 TABLET | Refills: 1 | Status: SHIPPED | OUTPATIENT
Start: 2021-06-08 | End: 2021-12-07

## 2021-06-08 RX ORDER — LORATADINE 10 MG/1
10 TABLET ORAL DAILY
COMMUNITY

## 2021-06-08 RX ORDER — METOPROLOL SUCCINATE 50 MG/1
50 TABLET, EXTENDED RELEASE ORAL DAILY
Qty: 90 TABLET | Refills: 1 | Status: SHIPPED | OUTPATIENT
Start: 2021-06-08 | End: 2021-12-07

## 2021-06-08 RX ORDER — WARFARIN SODIUM 5 MG/1
5 TABLET ORAL DAILY
COMMUNITY
End: 2021-06-08 | Stop reason: ALTCHOICE

## 2021-06-08 RX ORDER — HYDROCHLOROTHIAZIDE 25 MG/1
25 TABLET ORAL DAILY
Qty: 90 TABLET | Refills: 1 | Status: SHIPPED | OUTPATIENT
Start: 2021-06-08 | End: 2021-12-07

## 2021-06-08 RX ORDER — METOPROLOL SUCCINATE 50 MG/1
TABLET, EXTENDED RELEASE ORAL
COMMUNITY
Start: 2021-04-16 | End: 2021-06-08 | Stop reason: SDUPTHER

## 2021-06-08 RX ORDER — PRAVASTATIN SODIUM 40 MG
40 TABLET ORAL NIGHTLY
Qty: 90 TABLET | Refills: 1 | Status: SHIPPED | OUTPATIENT
Start: 2021-06-08 | End: 2021-12-08

## 2021-06-08 NOTE — ASSESSMENT & PLAN NOTE
Discussed regular exercise, working on diet, weight loss. Any additional recommendations will follow today's labs.

## 2021-06-08 NOTE — PROGRESS NOTES
Marilou Edwards presents to [facility].    Chief Complaint:  Med Refill         History of Present Illness:  Here for routine follow up, she is fasting for her labs. Takes her Metforomin daily but does not check her sugars.  Has not been following her diet or getting regular exercise.          Review of Systems:  Review of Systems   Constitutional: Negative for fatigue and fever.   HENT:        Seasonal allergy symptoms, manages with OTC antihistamines and nasal steroid sprays prn    Eyes: Negative for blurred vision.   Respiratory: Negative for cough and shortness of breath.    Cardiovascular: Negative for chest pain, palpitations and leg swelling.   Musculoskeletal: Positive for arthralgias.   Neurological: Negative for dizziness, weakness and headache.   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 Negative for sleep disturbance and depressed mood.        Medical History:  Past Medical History:   • Acquired coagulation factor deficiency (CMS/HCC)   • Acute maxillary sinusitis, unspecified   • Anxiety disorder   • Colon cancer (CMS/HCC)    dx'd in 2004   • Essential (primary) hypertension   • Gestational diabetes   • Hyperlipidemia, unspecified   • Long term (current) use of anticoagulants   • Other fatigue   • Other hereditary and idiopathic neuropathies   • Other specified disorders of nose and nasal sinuses   • Pain in left finger(s)   • Personal history of other venous thrombosis and embolism   • Prothrombin gene mutation (CMS/HCC)    one gene heterozygous/liden2   • Pulmonary embolism (CMS/HCC)    x2   • Type 2 diabetes mellitus without complication (CMS/HCC)   • Unspecified superficial injury of right lower leg, initial encounter     Past Surgical History:   • APPENDECTOMY   • COLON RESECTION    oophrectmy/salp   • COLONOSCOPY    normal   • COLONOSCOPY    normal reults   • COLOSTOMY    reversl   • DILATION AND  CURETTAGE, DIAGNOSTIC / THERAPEUTIC    hysterscopy   • HERNIA REPAIR    ventral hernia repair   • LUMBAR DIRECT LATERAL INTERBODY FUSION    index mucous cyst excision      Family History   Problem Relation Age of Onset   • Hypertension Mother    • Heart attack Father    • Alcohol abuse Father    • Hypertension Brother    • Drug abuse Brother         heroin OD   • Colonic polyp Sister    • Anxiety disorder Sister    • Drug abuse Sister    • Heart attack Maternal Grandmother    • Heart attack Maternal Grandfather    • Heart defect Paternal Grandmother    • Obesity Paternal Grandmother    • Heart attack Paternal Grandfather      Social History     Tobacco Use   • Smoking status: Former Smoker     Quit date:      Years since quittin.4   Substance Use Topics   • Alcohol use: Yes     Comment: rarely       Health Maintenance Due   Topic Date Due   • MAMMOGRAM  Never done   • COLORECTAL CANCER SCREENING  Never done   • ANNUAL PHYSICAL  Never done   • Pneumococcal Vaccine 0-64 (1 of 1 - PPSV23) Never done   • COVID-19 Vaccine (1) Never done   • Hepatitis B (1 of 3 - Risk 3-dose series) Never done   • ZOSTER VACCINE (1 of 2) Never done   • HEPATITIS C SCREENING  Never done   • DIABETIC FOOT EXAM  Never done   • PAP SMEAR  Never done   • LIPID PANEL  Never done   • HEMOGLOBIN A1C  2021   • DIABETIC EYE EXAM  Never done        Immunization History   Administered Date(s) Administered   • Hepatitis A 2018   • Influenza, Unspecified 10/23/2020   • Tdap 2012       Allergies   Allergen Reactions   • Metal Rash        Medications:  Current Outpatient Medications on File Prior to Visit   Medication Sig   • cholecalciferol (Cholecalciferol) 25 MCG (1000 UT) tablet Take 2,000 Units by mouth Daily.   • hydroCHLOROthiazide (HYDRODIURIL) 25 MG tablet    • loratadine (CLARITIN) 10 MG tablet Take 10 mg by mouth Daily.   • losartan (COZAAR) 50 MG tablet    • metFORMIN ER (GLUCOPHAGE-XR) 500 MG 24 hr tablet    •  "metoprolol succinate XL (TOPROL-XL) 50 MG 24 hr tablet    • pravastatin (PRAVACHOL) 40 MG tablet    • Xarelto 20 MG tablet    • [DISCONTINUED] warfarin (COUMADIN) 5 MG tablet Take 5 mg by mouth Daily.     No current facility-administered medications on file prior to visit.       Vital Signs:   /73 (BP Location: Right arm, Patient Position: Sitting)   Pulse 63   Temp 97.7 °F (36.5 °C) (Oral)   Ht 165.1 cm (65\")   Wt 92.7 kg (204 lb 6.4 oz)   BMI 34.01 kg/m²       Physical Exam:  Physical Exam  Constitutional:       Appearance: Normal appearance.   Neck:      Vascular: No carotid bruit.   Cardiovascular:      Rate and Rhythm: Normal rate and regular rhythm.      Pulses:           Posterior tibial pulses are 2+ on the right side and 2+ on the left side.      Heart sounds: No murmur heard.     Pulmonary:      Effort: No respiratory distress.      Breath sounds: Normal breath sounds.   Musculoskeletal:         General: No swelling.   Feet:      Right foot:      Protective Sensation: 3 sites tested. 3 sites sensed.      Skin integrity: Skin integrity normal. No ulcer or blister.      Toenail Condition: Right toenails are normal.      Left foot:      Protective Sensation: 3 sites tested. 3 sites sensed.      Skin integrity: Skin integrity normal. No ulcer or blister.      Toenail Condition: Left toenails are normal.      Comments: Diabetic Foot Exam Performed and Monofilament Test Performed     Skin:     Comments: Skin is clear    Neurological:      Mental Status: She is alert.   Psychiatric:         Mood and Affect: Mood normal.         Thought Content: Thought content normal.         Result Review      The following data was reviewed by: ELLI Savage on 06/08/2021:  CMP    CMP 7/18/20   Glucose 161 (A)   BUN 11   Creatinine 0.68   Sodium 137   Potassium 3.8   Chloride 99   Calcium 9.2   Albumin 4.3   Total Bilirubin 0.46   Alkaline Phosphatase 95   AST (SGOT) 18   ALT (SGPT) 28   (A) Abnormal " value            Lipid Panel    Lipid Panel 7/18/20   Total Cholesterol 228 (A)   Triglycerides 164 (A)   HDL Cholesterol 66 (A)   VLDL Cholesterol 33   LDL Cholesterol  129 (A)   (A) Abnormal value       Comments are available for some flowsheets but are not being displayed.               Lab Results   Component Value Date    HGBA1C 7.1 (H) 07/18/2020              Assessment and Plan:     Advised patient to get Covid vaccines, she does plan to go ahead and get those vaccines. Also has a copy of her last eye exam, went to [facility] and is aware she is due an exam, will schedule an appt.      Diagnoses and all orders for this visit:    1. Type 2 diabetes mellitus without complication, without long-term current use of insulin (CMS/[facility]) (Primary)  -     Comprehensive Metabolic Panel; Future  -     Lipid Panel; Future  -     Hemoglobin A1c; Future  -     Microalbumin / Creatinine Urine Ratio - Urine, Clean Catch; Future    2. Essential hypertension    3. Other hyperlipidemia      Follow Up   Return in about 6 months (around 12/8/2021).  Patient was given instructions and counseling regarding her condition or for health maintenance advice. Please see specific information pulled into the AVS if appropriate.

## 2021-07-01 VITALS
SYSTOLIC BLOOD PRESSURE: 130 MMHG | HEART RATE: 73 BPM | DIASTOLIC BLOOD PRESSURE: 71 MMHG | HEIGHT: 66 IN | BODY MASS INDEX: 34.62 KG/M2

## 2021-07-01 VITALS
BODY MASS INDEX: 34.62 KG/M2 | DIASTOLIC BLOOD PRESSURE: 72 MMHG | HEIGHT: 66 IN | SYSTOLIC BLOOD PRESSURE: 119 MMHG | HEART RATE: 72 BPM

## 2021-07-01 VITALS
SYSTOLIC BLOOD PRESSURE: 141 MMHG | BODY MASS INDEX: 35.28 KG/M2 | DIASTOLIC BLOOD PRESSURE: 80 MMHG | HEART RATE: 81 BPM | HEIGHT: 66 IN

## 2021-07-01 VITALS
HEIGHT: 66 IN | DIASTOLIC BLOOD PRESSURE: 72 MMHG | SYSTOLIC BLOOD PRESSURE: 135 MMHG | BODY MASS INDEX: 33.31 KG/M2 | HEART RATE: 61 BPM

## 2021-07-01 VITALS
WEIGHT: 218.6 LBS | DIASTOLIC BLOOD PRESSURE: 65 MMHG | HEART RATE: 66 BPM | HEIGHT: 66 IN | BODY MASS INDEX: 35.13 KG/M2 | SYSTOLIC BLOOD PRESSURE: 133 MMHG | TEMPERATURE: 98 F | OXYGEN SATURATION: 100 %

## 2021-07-01 VITALS
DIASTOLIC BLOOD PRESSURE: 73 MMHG | SYSTOLIC BLOOD PRESSURE: 140 MMHG | BODY MASS INDEX: 33.17 KG/M2 | TEMPERATURE: 98.3 F | WEIGHT: 206.4 LBS | HEIGHT: 66 IN | HEART RATE: 73 BPM

## 2021-07-01 VITALS
HEART RATE: 61 BPM | DIASTOLIC BLOOD PRESSURE: 73 MMHG | SYSTOLIC BLOOD PRESSURE: 125 MMHG | BODY MASS INDEX: 34.7 KG/M2 | HEIGHT: 66 IN

## 2021-07-01 VITALS
WEIGHT: 214.5 LBS | SYSTOLIC BLOOD PRESSURE: 156 MMHG | DIASTOLIC BLOOD PRESSURE: 85 MMHG | TEMPERATURE: 98.2 F | HEART RATE: 81 BPM | BODY MASS INDEX: 34.47 KG/M2 | HEIGHT: 66 IN

## 2021-07-01 VITALS
HEIGHT: 66 IN | SYSTOLIC BLOOD PRESSURE: 139 MMHG | DIASTOLIC BLOOD PRESSURE: 80 MMHG | HEART RATE: 62 BPM | BODY MASS INDEX: 35.44 KG/M2

## 2021-07-01 VITALS
SYSTOLIC BLOOD PRESSURE: 141 MMHG | HEART RATE: 70 BPM | BODY MASS INDEX: 34.7 KG/M2 | DIASTOLIC BLOOD PRESSURE: 81 MMHG | HEIGHT: 66 IN

## 2021-07-01 VITALS
HEART RATE: 78 BPM | BODY MASS INDEX: 35.44 KG/M2 | SYSTOLIC BLOOD PRESSURE: 139 MMHG | HEIGHT: 66 IN | DIASTOLIC BLOOD PRESSURE: 86 MMHG

## 2021-07-01 VITALS
DIASTOLIC BLOOD PRESSURE: 81 MMHG | HEART RATE: 75 BPM | SYSTOLIC BLOOD PRESSURE: 141 MMHG | HEIGHT: 66 IN | BODY MASS INDEX: 33.31 KG/M2

## 2021-07-01 VITALS
HEART RATE: 67 BPM | BODY MASS INDEX: 34.7 KG/M2 | DIASTOLIC BLOOD PRESSURE: 75 MMHG | HEIGHT: 66 IN | SYSTOLIC BLOOD PRESSURE: 136 MMHG

## 2021-07-01 VITALS
HEIGHT: 66 IN | DIASTOLIC BLOOD PRESSURE: 81 MMHG | HEART RATE: 81 BPM | BODY MASS INDEX: 35.44 KG/M2 | SYSTOLIC BLOOD PRESSURE: 126 MMHG

## 2021-07-01 VITALS
HEART RATE: 76 BPM | SYSTOLIC BLOOD PRESSURE: 113 MMHG | DIASTOLIC BLOOD PRESSURE: 66 MMHG | HEIGHT: 66 IN | BODY MASS INDEX: 34.72 KG/M2 | TEMPERATURE: 98.2 F | WEIGHT: 216 LBS

## 2021-07-01 VITALS
DIASTOLIC BLOOD PRESSURE: 74 MMHG | HEART RATE: 63 BPM | SYSTOLIC BLOOD PRESSURE: 134 MMHG | HEIGHT: 66 IN | BODY MASS INDEX: 34.7 KG/M2

## 2021-07-01 VITALS
DIASTOLIC BLOOD PRESSURE: 79 MMHG | SYSTOLIC BLOOD PRESSURE: 142 MMHG | HEART RATE: 72 BPM | HEIGHT: 66 IN | BODY MASS INDEX: 35.44 KG/M2

## 2021-07-01 VITALS
BODY MASS INDEX: 35.44 KG/M2 | SYSTOLIC BLOOD PRESSURE: 133 MMHG | HEIGHT: 66 IN | DIASTOLIC BLOOD PRESSURE: 77 MMHG | HEART RATE: 81 BPM

## 2021-07-01 VITALS
DIASTOLIC BLOOD PRESSURE: 78 MMHG | BODY MASS INDEX: 34.7 KG/M2 | HEART RATE: 66 BPM | HEIGHT: 66 IN | SYSTOLIC BLOOD PRESSURE: 148 MMHG

## 2021-07-01 VITALS
BODY MASS INDEX: 33.31 KG/M2 | SYSTOLIC BLOOD PRESSURE: 127 MMHG | DIASTOLIC BLOOD PRESSURE: 73 MMHG | HEART RATE: 65 BPM | HEIGHT: 66 IN

## 2021-07-01 VITALS
HEIGHT: 66 IN | BODY MASS INDEX: 34.7 KG/M2 | SYSTOLIC BLOOD PRESSURE: 136 MMHG | DIASTOLIC BLOOD PRESSURE: 69 MMHG | HEART RATE: 64 BPM

## 2021-07-01 VITALS
HEIGHT: 66 IN | SYSTOLIC BLOOD PRESSURE: 130 MMHG | BODY MASS INDEX: 34.7 KG/M2 | DIASTOLIC BLOOD PRESSURE: 76 MMHG | HEART RATE: 71 BPM

## 2021-07-01 VITALS
BODY MASS INDEX: 35.28 KG/M2 | SYSTOLIC BLOOD PRESSURE: 143 MMHG | HEART RATE: 84 BPM | DIASTOLIC BLOOD PRESSURE: 80 MMHG | HEIGHT: 66 IN

## 2021-07-01 VITALS
DIASTOLIC BLOOD PRESSURE: 76 MMHG | SYSTOLIC BLOOD PRESSURE: 139 MMHG | BODY MASS INDEX: 35.44 KG/M2 | HEIGHT: 66 IN | HEART RATE: 76 BPM

## 2021-07-01 VITALS
HEART RATE: 73 BPM | BODY MASS INDEX: 35.28 KG/M2 | DIASTOLIC BLOOD PRESSURE: 76 MMHG | SYSTOLIC BLOOD PRESSURE: 139 MMHG | HEIGHT: 66 IN

## 2021-07-01 VITALS
TEMPERATURE: 98.7 F | WEIGHT: 216.6 LBS | HEART RATE: 71 BPM | HEIGHT: 66 IN | BODY MASS INDEX: 34.81 KG/M2 | DIASTOLIC BLOOD PRESSURE: 68 MMHG | SYSTOLIC BLOOD PRESSURE: 147 MMHG

## 2021-07-01 VITALS
HEART RATE: 61 BPM | SYSTOLIC BLOOD PRESSURE: 125 MMHG | BODY MASS INDEX: 33.31 KG/M2 | DIASTOLIC BLOOD PRESSURE: 71 MMHG | HEIGHT: 66 IN

## 2021-07-01 VITALS
DIASTOLIC BLOOD PRESSURE: 76 MMHG | HEART RATE: 75 BPM | SYSTOLIC BLOOD PRESSURE: 136 MMHG | BODY MASS INDEX: 34.62 KG/M2 | HEIGHT: 66 IN

## 2021-07-01 VITALS
BODY MASS INDEX: 35.28 KG/M2 | SYSTOLIC BLOOD PRESSURE: 133 MMHG | DIASTOLIC BLOOD PRESSURE: 70 MMHG | HEART RATE: 62 BPM | HEIGHT: 66 IN

## 2021-07-01 VITALS
DIASTOLIC BLOOD PRESSURE: 85 MMHG | HEIGHT: 66 IN | SYSTOLIC BLOOD PRESSURE: 143 MMHG | BODY MASS INDEX: 35.44 KG/M2 | HEART RATE: 82 BPM

## 2021-07-01 VITALS — BODY MASS INDEX: 34.86 KG/M2 | SYSTOLIC BLOOD PRESSURE: 130 MMHG | HEIGHT: 66 IN | DIASTOLIC BLOOD PRESSURE: 78 MMHG

## 2021-07-01 VITALS
HEART RATE: 65 BPM | BODY MASS INDEX: 34.62 KG/M2 | DIASTOLIC BLOOD PRESSURE: 63 MMHG | SYSTOLIC BLOOD PRESSURE: 116 MMHG | HEIGHT: 66 IN

## 2021-07-01 VITALS
HEIGHT: 66 IN | TEMPERATURE: 97.7 F | HEART RATE: 61 BPM | SYSTOLIC BLOOD PRESSURE: 131 MMHG | DIASTOLIC BLOOD PRESSURE: 71 MMHG | BODY MASS INDEX: 34.86 KG/M2

## 2021-07-01 VITALS
HEIGHT: 66 IN | SYSTOLIC BLOOD PRESSURE: 132 MMHG | DIASTOLIC BLOOD PRESSURE: 85 MMHG | BODY MASS INDEX: 34.7 KG/M2 | HEART RATE: 85 BPM

## 2021-07-01 VITALS
SYSTOLIC BLOOD PRESSURE: 137 MMHG | BODY MASS INDEX: 35.44 KG/M2 | DIASTOLIC BLOOD PRESSURE: 86 MMHG | HEART RATE: 70 BPM | HEIGHT: 66 IN

## 2021-07-01 VITALS
HEART RATE: 75 BPM | HEIGHT: 66 IN | WEIGHT: 219.6 LBS | SYSTOLIC BLOOD PRESSURE: 149 MMHG | TEMPERATURE: 98.1 F | DIASTOLIC BLOOD PRESSURE: 78 MMHG | BODY MASS INDEX: 35.29 KG/M2

## 2021-07-01 VITALS
HEIGHT: 66 IN | SYSTOLIC BLOOD PRESSURE: 142 MMHG | HEART RATE: 81 BPM | DIASTOLIC BLOOD PRESSURE: 81 MMHG | BODY MASS INDEX: 35.44 KG/M2

## 2021-07-01 VITALS
HEART RATE: 81 BPM | BODY MASS INDEX: 35.44 KG/M2 | HEIGHT: 66 IN | SYSTOLIC BLOOD PRESSURE: 129 MMHG | DIASTOLIC BLOOD PRESSURE: 81 MMHG

## 2021-07-01 VITALS
HEART RATE: 65 BPM | HEIGHT: 66 IN | DIASTOLIC BLOOD PRESSURE: 74 MMHG | SYSTOLIC BLOOD PRESSURE: 123 MMHG | BODY MASS INDEX: 34.7 KG/M2

## 2021-07-01 VITALS
DIASTOLIC BLOOD PRESSURE: 74 MMHG | HEART RATE: 66 BPM | HEIGHT: 66 IN | SYSTOLIC BLOOD PRESSURE: 129 MMHG | BODY MASS INDEX: 34.7 KG/M2

## 2021-07-01 VITALS
SYSTOLIC BLOOD PRESSURE: 129 MMHG | BODY MASS INDEX: 34.7 KG/M2 | HEIGHT: 66 IN | DIASTOLIC BLOOD PRESSURE: 78 MMHG | HEART RATE: 71 BPM

## 2021-07-01 VITALS
HEIGHT: 66 IN | DIASTOLIC BLOOD PRESSURE: 68 MMHG | TEMPERATURE: 101.3 F | BODY MASS INDEX: 34.55 KG/M2 | WEIGHT: 215 LBS | HEART RATE: 97 BPM | SYSTOLIC BLOOD PRESSURE: 142 MMHG

## 2021-07-01 VITALS
HEART RATE: 76 BPM | BODY MASS INDEX: 35.44 KG/M2 | DIASTOLIC BLOOD PRESSURE: 85 MMHG | SYSTOLIC BLOOD PRESSURE: 140 MMHG | HEIGHT: 66 IN

## 2021-07-01 VITALS
HEIGHT: 66 IN | DIASTOLIC BLOOD PRESSURE: 74 MMHG | HEART RATE: 68 BPM | BODY MASS INDEX: 33.31 KG/M2 | SYSTOLIC BLOOD PRESSURE: 138 MMHG

## 2021-07-01 VITALS
HEART RATE: 65 BPM | HEIGHT: 66 IN | BODY MASS INDEX: 35.44 KG/M2 | SYSTOLIC BLOOD PRESSURE: 126 MMHG | DIASTOLIC BLOOD PRESSURE: 75 MMHG

## 2021-07-01 VITALS
DIASTOLIC BLOOD PRESSURE: 72 MMHG | SYSTOLIC BLOOD PRESSURE: 106 MMHG | HEART RATE: 75 BPM | HEIGHT: 66 IN | BODY MASS INDEX: 34.7 KG/M2

## 2021-07-01 VITALS
DIASTOLIC BLOOD PRESSURE: 66 MMHG | BODY MASS INDEX: 33.31 KG/M2 | HEART RATE: 56 BPM | HEIGHT: 66 IN | SYSTOLIC BLOOD PRESSURE: 120 MMHG | TEMPERATURE: 97.5 F

## 2021-07-01 VITALS
HEIGHT: 66 IN | HEART RATE: 75 BPM | DIASTOLIC BLOOD PRESSURE: 80 MMHG | BODY MASS INDEX: 35.44 KG/M2 | SYSTOLIC BLOOD PRESSURE: 134 MMHG

## 2021-07-02 VITALS
HEART RATE: 78 BPM | SYSTOLIC BLOOD PRESSURE: 133 MMHG | DIASTOLIC BLOOD PRESSURE: 85 MMHG | HEIGHT: 66 IN | BODY MASS INDEX: 33.31 KG/M2

## 2021-07-02 VITALS
DIASTOLIC BLOOD PRESSURE: 72 MMHG | BODY MASS INDEX: 33.31 KG/M2 | SYSTOLIC BLOOD PRESSURE: 135 MMHG | HEART RATE: 71 BPM | HEIGHT: 66 IN

## 2021-07-02 VITALS
SYSTOLIC BLOOD PRESSURE: 137 MMHG | HEART RATE: 90 BPM | DIASTOLIC BLOOD PRESSURE: 74 MMHG | HEIGHT: 66 IN | BODY MASS INDEX: 33.31 KG/M2

## 2021-07-02 VITALS
HEART RATE: 74 BPM | BODY MASS INDEX: 33.31 KG/M2 | HEIGHT: 66 IN | SYSTOLIC BLOOD PRESSURE: 132 MMHG | DIASTOLIC BLOOD PRESSURE: 78 MMHG

## 2021-07-02 VITALS
DIASTOLIC BLOOD PRESSURE: 75 MMHG | BODY MASS INDEX: 33.31 KG/M2 | SYSTOLIC BLOOD PRESSURE: 154 MMHG | HEART RATE: 79 BPM | HEIGHT: 66 IN

## 2021-07-02 VITALS
BODY MASS INDEX: 33.31 KG/M2 | HEIGHT: 66 IN | HEART RATE: 80 BPM | DIASTOLIC BLOOD PRESSURE: 85 MMHG | SYSTOLIC BLOOD PRESSURE: 138 MMHG

## 2021-07-02 VITALS
HEART RATE: 79 BPM | DIASTOLIC BLOOD PRESSURE: 77 MMHG | BODY MASS INDEX: 33.31 KG/M2 | HEIGHT: 66 IN | SYSTOLIC BLOOD PRESSURE: 136 MMHG

## 2021-07-02 VITALS
HEART RATE: 73 BPM | DIASTOLIC BLOOD PRESSURE: 83 MMHG | HEIGHT: 66 IN | TEMPERATURE: 97.8 F | SYSTOLIC BLOOD PRESSURE: 156 MMHG | BODY MASS INDEX: 33.31 KG/M2

## 2021-07-02 VITALS
BODY MASS INDEX: 34.49 KG/M2 | DIASTOLIC BLOOD PRESSURE: 70 MMHG | HEART RATE: 98 BPM | TEMPERATURE: 97.6 F | HEIGHT: 66 IN | SYSTOLIC BLOOD PRESSURE: 160 MMHG | OXYGEN SATURATION: 98 % | WEIGHT: 214.6 LBS

## 2021-07-02 VITALS
WEIGHT: 222.2 LBS | DIASTOLIC BLOOD PRESSURE: 71 MMHG | HEART RATE: 71 BPM | TEMPERATURE: 97.5 F | SYSTOLIC BLOOD PRESSURE: 135 MMHG | HEIGHT: 66 IN | BODY MASS INDEX: 35.71 KG/M2

## 2021-07-02 VITALS
HEIGHT: 66 IN | HEART RATE: 66 BPM | DIASTOLIC BLOOD PRESSURE: 72 MMHG | SYSTOLIC BLOOD PRESSURE: 125 MMHG | BODY MASS INDEX: 33.31 KG/M2

## 2021-07-02 VITALS
BODY MASS INDEX: 34.78 KG/M2 | HEART RATE: 83 BPM | HEIGHT: 66 IN | DIASTOLIC BLOOD PRESSURE: 73 MMHG | WEIGHT: 216.4 LBS | TEMPERATURE: 97.4 F | SYSTOLIC BLOOD PRESSURE: 146 MMHG

## 2021-07-02 VITALS
HEART RATE: 82 BPM | BODY MASS INDEX: 33.31 KG/M2 | DIASTOLIC BLOOD PRESSURE: 75 MMHG | SYSTOLIC BLOOD PRESSURE: 129 MMHG | HEIGHT: 66 IN

## 2021-07-02 VITALS
TEMPERATURE: 98.2 F | SYSTOLIC BLOOD PRESSURE: 137 MMHG | HEART RATE: 85 BPM | DIASTOLIC BLOOD PRESSURE: 71 MMHG | BODY MASS INDEX: 35.58 KG/M2 | HEIGHT: 66 IN | WEIGHT: 221.4 LBS

## 2021-07-20 RX ORDER — RIVAROXABAN 20 MG/1
20 TABLET, FILM COATED ORAL DAILY
Qty: 90 TABLET | Refills: 0 | Status: SHIPPED | OUTPATIENT
Start: 2021-07-20 | End: 2021-09-17 | Stop reason: SDUPTHER

## 2021-07-20 NOTE — TELEPHONE ENCOUNTER
Requesting a refill of Xarelto 20mg one daily. LV- 06/08/21, LF- 07/06/20, #30, 11 refills per Dr Soto. Next appt 12/08/21.

## 2021-09-15 ENCOUNTER — OFFICE VISIT (OUTPATIENT)
Dept: FAMILY MEDICINE CLINIC | Age: 56
End: 2021-09-15

## 2021-09-15 VITALS
DIASTOLIC BLOOD PRESSURE: 65 MMHG | BODY MASS INDEX: 33.58 KG/M2 | WEIGHT: 201.8 LBS | HEART RATE: 74 BPM | SYSTOLIC BLOOD PRESSURE: 146 MMHG

## 2021-09-15 DIAGNOSIS — M72.2 PLANTAR FASCIITIS: ICD-10-CM

## 2021-09-15 DIAGNOSIS — M79.672 HEEL PAIN, CHRONIC, LEFT: ICD-10-CM

## 2021-09-15 DIAGNOSIS — M54.50 RIGHT LOW BACK PAIN, UNSPECIFIED CHRONICITY, UNSPECIFIED WHETHER SCIATICA PRESENT: Primary | ICD-10-CM

## 2021-09-15 DIAGNOSIS — I10 ESSENTIAL HYPERTENSION: ICD-10-CM

## 2021-09-15 DIAGNOSIS — R10.9 ACUTE RIGHT FLANK PAIN: ICD-10-CM

## 2021-09-15 DIAGNOSIS — C18.9 MALIGNANT NEOPLASM OF COLON, UNSPECIFIED PART OF COLON (HCC): ICD-10-CM

## 2021-09-15 DIAGNOSIS — G89.29 HEEL PAIN, CHRONIC, LEFT: ICD-10-CM

## 2021-09-15 LAB
BACTERIA UR QL AUTO: ABNORMAL /HPF
BILIRUB UR QL STRIP: NEGATIVE
CLARITY UR: ABNORMAL
COLOR UR: YELLOW
GLUCOSE UR STRIP-MCNC: NEGATIVE MG/DL
HGB UR QL STRIP.AUTO: NEGATIVE
HYALINE CASTS UR QL AUTO: ABNORMAL /LPF
KETONES UR QL STRIP: NEGATIVE
LEUKOCYTE ESTERASE UR QL STRIP.AUTO: ABNORMAL
NITRITE UR QL STRIP: NEGATIVE
PH UR STRIP.AUTO: 6 [PH] (ref 5–8)
PROT UR QL STRIP: NEGATIVE
RBC # UR: ABNORMAL /HPF
REF LAB TEST METHOD: ABNORMAL
SP GR UR STRIP: 1.02 (ref 1–1.03)
SQUAMOUS #/AREA URNS HPF: ABNORMAL /HPF
UROBILINOGEN UR QL STRIP: ABNORMAL
WBC UR QL AUTO: ABNORMAL /HPF

## 2021-09-15 PROCEDURE — 99214 OFFICE O/P EST MOD 30 MIN: CPT | Performed by: NURSE PRACTITIONER

## 2021-09-15 PROCEDURE — 81001 URINALYSIS AUTO W/SCOPE: CPT | Performed by: NURSE PRACTITIONER

## 2021-09-15 RX ORDER — DICLOFENAC SODIUM 75 MG/1
75 TABLET, DELAYED RELEASE ORAL 2 TIMES DAILY WITH MEALS
Qty: 60 TABLET | Refills: 1 | Status: SHIPPED | OUTPATIENT
Start: 2021-09-15 | End: 2021-11-04

## 2021-09-15 RX ORDER — DICLOFENAC SODIUM 75 MG/1
75 TABLET, DELAYED RELEASE ORAL 2 TIMES DAILY WITH MEALS
Qty: 60 TABLET | Refills: 1 | Status: SHIPPED | OUTPATIENT
Start: 2021-09-15 | End: 2021-09-15

## 2021-09-15 NOTE — PROGRESS NOTES
Marilou Edwards presents to [facility].    Chief Complaint:  Foot Pain ((L) heel, also having low back pain, thinks she may have a UTI )         History of Present Illness:  Left heel pain:  2 years off and on, worse in the last few months, worse when first stands  Stands for work, cafeteria  Not tried anything for this pain       Low back pain, right flank pain   Other symptoms: none  Started three weeks ago, worse in the am   Feels better after urinates     PMH changes: none since 6-2021        Review of Systems:  Review of Systems   Constitutional: Negative for fever.   Gastrointestinal:        History of colon cancer, has an upcoming follow up with her oncologist    Genitourinary: Negative for dysuria and hematuria.          Vital Signs:   /65 (BP Location: Left arm, Patient Position: Sitting, Cuff Size: Adult)   Pulse 74   Wt 91.5 kg (201 lb 12.8 oz)   BMI 33.58 kg/m²       Physical Exam:  Physical Exam  Constitutional:       Appearance: Normal appearance.   Cardiovascular:      Rate and Rhythm: Normal rate and regular rhythm.      Heart sounds: No murmur heard.     Pulmonary:      Effort: Pulmonary effort is normal.      Breath sounds: Normal breath sounds.   Abdominal:      Tenderness: There is no right CVA tenderness or left CVA tenderness.   Musculoskeletal:      Comments: Tender to palpation of left heel, skin clear    Neurological:      Mental Status: She is alert.   Psychiatric:         Mood and Affect: Mood normal.         Behavior: Behavior normal.         Result Review      The following data was reviewed by: ELLI Savage on 09/15/2021:    Results for orders placed or performed in visit on 06/08/21   Comprehensive Metabolic Panel    Specimen: Blood   Result Value Ref Range    Glucose 154 (H) 65 - 99 mg/dL    BUN 12 6 - 20 mg/dL    Creatinine 0.67 0.57 - 1.00 mg/dL    Sodium 139 136 - 145 mmol/L    Potassium 4.1 3.5 - 5.2 mmol/L    Chloride 101 98  - 107 mmol/L    CO2 26.0 22.0 - 29.0 mmol/L    Calcium 9.6 8.6 - 10.5 mg/dL    Total Protein 7.1 6.0 - 8.5 g/dL    Albumin 4.50 3.50 - 5.20 g/dL    ALT (SGPT) 26 1 - 33 U/L    AST (SGOT) 18 1 - 32 U/L    Alkaline Phosphatase 109 39 - 117 U/L    Total Bilirubin 0.3 0.0 - 1.2 mg/dL    eGFR Non African Amer 91 >60 mL/min/1.73    Globulin 2.6 gm/dL    A/G Ratio 1.7 g/dL    BUN/Creatinine Ratio 17.9 7.0 - 25.0    Anion Gap 12.0 5.0 - 15.0 mmol/L   Lipid Panel    Specimen: Blood   Result Value Ref Range    Total Cholesterol 205 (H) 0 - 200 mg/dL    Triglycerides 118 0 - 150 mg/dL    HDL Cholesterol 60 40 - 60 mg/dL    LDL Cholesterol  124 (H) 0 - 100 mg/dL    VLDL Cholesterol 21 5 - 40 mg/dL    LDL/HDL Ratio 2.02    Hemoglobin A1c    Specimen: Blood   Result Value Ref Range    Hemoglobin A1C 6.90 (H) 4.80 - 5.60 %   Microalbumin / Creatinine Urine Ratio - Urine, Clean Catch    Specimen: Urine, Clean Catch   Result Value Ref Range    Microalbumin/Creatinine Ratio 9.1 mg/g    Creatinine, Urine 285.8 mg/dL    Microalbumin, Urine 2.6 mg/dL               Assessment and Plan:       {CC Problem List  Visit Diagnosis  ROS  Review (Popup)  Health Maintenance  Quality  BestPractice  Medications  SmartSets  SnapShot Encounters  Media :23}   Diagnoses and all orders for this visit:    1. Right low back pain, unspecified chronicity, unspecified whether sciatica present (Primary)  -     Urinalysis With Microscopic - Urine, Clean Catch  -     Cancel: Urinalysis With Microscopic - Urine, Clean Catch  -     Cancel: Urinalysis With Microscopic - Urine, Clean Catch  -     Cancel: Urinalysis With Microscopic - Urine, Clean Catch    2. Heel pain, chronic, left  Assessment & Plan:  Consider podiatry referral       3. Acute right flank pain  Assessment & Plan:  Will wait on urine results, drink plenty of water     Orders:  -     Urinalysis With Microscopic - Urine, Clean Catch  -     Cancel: Urinalysis With Microscopic - Urine,  Clean Catch  -     Cancel: Urinalysis With Microscopic - Urine, Clean Catch  -     Cancel: Urinalysis With Microscopic - Urine, Clean Catch  -     Urinalysis With Culture If Indicated -; Future    4. Essential hypertension  Assessment & Plan:  Monitor blood pressure       5. Plantar fasciitis  Assessment & Plan:  Trial of diclofenac, consider podiatry referral     Orders:  -     Discontinue: diclofenac (VOLTAREN) 75 MG EC tablet; Take 1 tablet by mouth 2 (Two) Times a Day With Meals.  Dispense: 60 tablet; Refill: 1  -     diclofenac (VOLTAREN) 75 MG EC tablet; Take 1 tablet by mouth 2 (Two) Times a Day With Meals.  Dispense: 60 tablet; Refill: 1    6. Malignant neoplasm of colon, unspecified part of colon (CMS/HCC)  Assessment & Plan:  Keep her upcoming follow up appt with oncologist           Follow Up   Return for pending lab results .  Patient was given instructions and counseling regarding her condition or for health maintenance advice. Please see specific information pulled into the AVS if appropriate.

## 2021-09-17 ENCOUNTER — TELEPHONE (OUTPATIENT)
Dept: FAMILY MEDICINE CLINIC | Age: 56
End: 2021-09-17

## 2021-09-17 RX ORDER — RIVAROXABAN 20 MG/1
20 TABLET, FILM COATED ORAL DAILY
Qty: 90 TABLET | Refills: 3 | Status: SHIPPED | OUTPATIENT
Start: 2021-09-17 | End: 2022-09-27

## 2021-09-17 RX ORDER — VALACYCLOVIR HYDROCHLORIDE 1 G/1
1000 TABLET, FILM COATED ORAL 2 TIMES DAILY PRN
Qty: 60 TABLET | Refills: 1 | Status: SHIPPED | OUTPATIENT
Start: 2021-09-17 | End: 2023-02-16

## 2021-09-17 RX ORDER — VALACYCLOVIR HYDROCHLORIDE 1 G/1
1000 TABLET, FILM COATED ORAL 2 TIMES DAILY PRN
Qty: 60 TABLET | Refills: 1 | Status: SHIPPED | OUTPATIENT
Start: 2021-09-17 | End: 2021-09-17 | Stop reason: SDUPTHER

## 2021-09-17 NOTE — TELEPHONE ENCOUNTER
A Carmen refilled her Valtrex 1000mg today. She sent it to [facility] pharmacy but it needs to go to [facility]. MOSHE Morales approved transfer.

## 2021-11-04 DIAGNOSIS — M72.2 PLANTAR FASCIITIS: ICD-10-CM

## 2021-11-04 RX ORDER — DICLOFENAC SODIUM 75 MG/1
75 TABLET, DELAYED RELEASE ORAL 2 TIMES DAILY WITH MEALS
Qty: 60 TABLET | Refills: 0 | Status: SHIPPED | OUTPATIENT
Start: 2021-11-04 | End: 2021-11-29

## 2021-11-04 NOTE — TELEPHONE ENCOUNTER
Rx Refill Note  Requested Prescriptions     Pending Prescriptions Disp Refills   • diclofenac (VOLTAREN) 75 MG EC tablet [Pharmacy Med Name: DICLOFENAC SOD EC 75 MG TAB] 60 tablet 1     Sig: TAKE 1 TABLET BY MOUTH TWICE A DAY WITH MEALS      Last office visit with prescribing clinician: 9/15/2021      Next office visit with prescribing clinician: 12/8/2021   Lab: Comprehensive Metabolic Panel (06/08/2021 09:21)  Last fill: 09/15/21, #60, 1 refill    ** Pt on Xarelto 20mg daily. Is it ok to fill?      Melissa Nice LPN  11/04/21, 11:44 EDT

## 2021-11-28 DIAGNOSIS — M72.2 PLANTAR FASCIITIS: ICD-10-CM

## 2021-11-29 RX ORDER — DICLOFENAC SODIUM 75 MG/1
TABLET, DELAYED RELEASE ORAL
Qty: 60 TABLET | Refills: 0 | Status: SHIPPED | OUTPATIENT
Start: 2021-11-29 | End: 2021-12-08 | Stop reason: SDUPTHER

## 2021-11-29 NOTE — TELEPHONE ENCOUNTER
Rx Refill Note  Requested Prescriptions     Pending Prescriptions Disp Refills   • diclofenac (VOLTAREN) 75 MG EC tablet [Pharmacy Med Name: DICLOFENAC SOD EC 75 MG TAB] 60 tablet 0     Sig: TAKE 1 TABLET BY MOUTH TWICE A DAY WITH MEALS      Last office visit with prescribing clinician: 9/15/2021      Next office visit with prescribing clinician: 12/8/2021       {TIP  Please add Last Relevant Lab Date if appropriate:   Lab Results   Component Value Date    GLUCOSE 154 (H) 06/08/2021    BUN 12 06/08/2021    CREATININE 0.67 06/08/2021    EGFRIFNONA 91 06/08/2021    BCR 17.9 06/08/2021    K 4.1 06/08/2021    CO2 26.0 06/08/2021    CALCIUM 9.6 06/08/2021    ALBUMIN 4.50 06/08/2021    LABIL2 1.7 07/18/2020    AST 18 06/08/2021    ALT 26 06/08/2021     WBC   Date Value Ref Range Status   03/09/2019 6.89 4.80 - 10.80 10*3/uL Final     RBC   Date Value Ref Range Status   03/09/2019 4.76 4.20 - 5.40 10*6/uL Final     Hgb   Date Value Ref Range Status   03/09/2019 14.80 12.00 - 16.00 g/dL Final     Hematocrit   Date Value Ref Range Status   03/09/2019 44.1 37.0 - 47.0 % Final     MCV   Date Value Ref Range Status   03/09/2019 92.6 81.0 - 99.0 fL Final     MCH   Date Value Ref Range Status   03/09/2019 31.1 (H) 27.0 - 31.0 pg Final     MCHC   Date Value Ref Range Status   03/09/2019 33.6 33.0 - 37.0 Final     RDW   Date Value Ref Range Status   03/09/2019 11.8 11.5 - 14.5 % Final     RDW-SD   Date Value Ref Range Status   03/09/2019 41.1 NA Final     MPV   Date Value Ref Range Status   03/09/2019 8.6 7.4 - 10.4 fL Final     Comment:     Testing performed by:  [facility]  CLIA#[id number]  [address]  [place] [id number]       Platelets   Date Value Ref Range Status   03/09/2019 339.00 130.00 - 400.00 10*3/uL Final     Monocytes Absolute   Date Value Ref Range Status   03/09/2019 0.37 0.20 - 1.20 10*3/uL Final     Eosinophils Absolute   Date Value Ref Range Status   03/09/2019 0.27  0.00 - 0.70 10*3/uL Final     Basophils Absolute   Date Value Ref Range Status   03/09/2019 0.03 0.00 - 0.20 10*3/uL Final        {TIP  Is Refill Pharmacy correct?YES  Mariza Ortega  11/29/21, 12:13 EST

## 2021-12-07 DIAGNOSIS — I10 ESSENTIAL HYPERTENSION: ICD-10-CM

## 2021-12-07 DIAGNOSIS — E11.9 TYPE 2 DIABETES MELLITUS WITHOUT COMPLICATION, WITHOUT LONG-TERM CURRENT USE OF INSULIN (HCC): ICD-10-CM

## 2021-12-07 RX ORDER — HYDROCHLOROTHIAZIDE 25 MG/1
TABLET ORAL
Qty: 90 TABLET | Refills: 1 | Status: SHIPPED | OUTPATIENT
Start: 2021-12-07 | End: 2021-12-08 | Stop reason: SDUPTHER

## 2021-12-07 RX ORDER — METOPROLOL SUCCINATE 50 MG/1
TABLET, EXTENDED RELEASE ORAL
Qty: 90 TABLET | Refills: 1 | Status: SHIPPED | OUTPATIENT
Start: 2021-12-07 | End: 2021-12-08 | Stop reason: SDUPTHER

## 2021-12-07 RX ORDER — METFORMIN HYDROCHLORIDE 500 MG/1
TABLET, EXTENDED RELEASE ORAL
Qty: 180 TABLET | Refills: 1 | Status: SHIPPED | OUTPATIENT
Start: 2021-12-07 | End: 2021-12-08 | Stop reason: SDUPTHER

## 2021-12-07 RX ORDER — LOSARTAN POTASSIUM 50 MG/1
TABLET ORAL
Qty: 90 TABLET | Refills: 1 | Status: SHIPPED | OUTPATIENT
Start: 2021-12-07 | End: 2021-12-08 | Stop reason: SDUPTHER

## 2021-12-08 ENCOUNTER — OFFICE VISIT (OUTPATIENT)
Dept: FAMILY MEDICINE CLINIC | Age: 56
End: 2021-12-08

## 2021-12-08 VITALS
TEMPERATURE: 98.1 F | BODY MASS INDEX: 33.99 KG/M2 | WEIGHT: 204 LBS | OXYGEN SATURATION: 98 % | DIASTOLIC BLOOD PRESSURE: 60 MMHG | SYSTOLIC BLOOD PRESSURE: 137 MMHG | RESPIRATION RATE: 20 BRPM | HEART RATE: 73 BPM | HEIGHT: 65 IN

## 2021-12-08 DIAGNOSIS — I10 ESSENTIAL HYPERTENSION: ICD-10-CM

## 2021-12-08 DIAGNOSIS — Z23 NEED FOR INFLUENZA VACCINATION: Primary | ICD-10-CM

## 2021-12-08 DIAGNOSIS — M72.2 PLANTAR FASCIITIS: ICD-10-CM

## 2021-12-08 DIAGNOSIS — G89.29 HEEL PAIN, CHRONIC, LEFT: ICD-10-CM

## 2021-12-08 DIAGNOSIS — E11.9 TYPE 2 DIABETES MELLITUS WITHOUT COMPLICATION, WITHOUT LONG-TERM CURRENT USE OF INSULIN (HCC): ICD-10-CM

## 2021-12-08 DIAGNOSIS — E78.49 OTHER HYPERLIPIDEMIA: ICD-10-CM

## 2021-12-08 DIAGNOSIS — M79.672 HEEL PAIN, CHRONIC, LEFT: ICD-10-CM

## 2021-12-08 PROCEDURE — 90471 IMMUNIZATION ADMIN: CPT | Performed by: NURSE PRACTITIONER

## 2021-12-08 PROCEDURE — 90686 IIV4 VACC NO PRSV 0.5 ML IM: CPT | Performed by: NURSE PRACTITIONER

## 2021-12-08 PROCEDURE — 99214 OFFICE O/P EST MOD 30 MIN: CPT | Performed by: NURSE PRACTITIONER

## 2021-12-08 RX ORDER — DICLOFENAC SODIUM 75 MG/1
75 TABLET, DELAYED RELEASE ORAL 2 TIMES DAILY WITH MEALS
Qty: 60 TABLET | Refills: 0 | Status: SHIPPED | OUTPATIENT
Start: 2021-12-08 | End: 2022-01-24

## 2021-12-08 RX ORDER — METOPROLOL SUCCINATE 50 MG/1
50 TABLET, EXTENDED RELEASE ORAL DAILY
Qty: 90 TABLET | Refills: 1 | Status: SHIPPED | OUTPATIENT
Start: 2021-12-08 | End: 2022-09-27

## 2021-12-08 RX ORDER — HYDROCHLOROTHIAZIDE 25 MG/1
25 TABLET ORAL DAILY
Qty: 90 TABLET | Refills: 1 | Status: SHIPPED | OUTPATIENT
Start: 2021-12-08 | End: 2022-09-27

## 2021-12-08 RX ORDER — METFORMIN HYDROCHLORIDE 500 MG/1
500 TABLET, EXTENDED RELEASE ORAL 2 TIMES DAILY WITH MEALS
Qty: 180 TABLET | Refills: 1 | Status: SHIPPED | OUTPATIENT
Start: 2021-12-08 | End: 2022-09-27

## 2021-12-08 RX ORDER — PRAVASTATIN SODIUM 40 MG
40 TABLET ORAL DAILY
Qty: 90 TABLET | Refills: 1 | Status: SHIPPED | OUTPATIENT
Start: 2021-12-08 | End: 2022-05-31

## 2021-12-08 RX ORDER — LOSARTAN POTASSIUM 50 MG/1
50 TABLET ORAL DAILY
Qty: 90 TABLET | Refills: 1 | Status: SHIPPED | OUTPATIENT
Start: 2021-12-08 | End: 2022-09-27

## 2021-12-08 RX ORDER — PRAVASTATIN SODIUM 40 MG
40 TABLET ORAL DAILY
COMMUNITY
End: 2021-12-08 | Stop reason: SDUPTHER

## 2021-12-08 NOTE — ASSESSMENT & PLAN NOTE
To return fasting, To work on diet, regular exercise, monitor sugars, check feet daily, see optometrist yearly or as directed.

## 2021-12-08 NOTE — ASSESSMENT & PLAN NOTE
Hypertension is stable. Continue to monitor BP at home. Continue current meds. Continue to modify diet and lifestyle.

## 2021-12-08 NOTE — PROGRESS NOTES
Marilou Edwards presents to [facility].    Chief Complaint:  Sciatica (6 mo. follow up) follow up of her HTN, DM and cholesterol         History of Present Illness:  Hypertension:  Current medication losartan, Toprol / HCTZ   Tolerating Medication: Yes  Checking BP at home and it is : 120's/60-80's  Needs refills: Yes. [facility]   Lab Results       Component                Value               Date                       GLUCOSE                  154 (H)             06/08/2021                 BUN                      12                  06/08/2021                 CREATININE               0.67                06/08/2021                 EGFRIFNONA               91                  06/08/2021                 BCR                      17.9                06/08/2021                 K                        4.1                 06/08/2021                 CO2                      26.0                06/08/2021                 CALCIUM                  9.6                 06/08/2021                 ALBUMIN                  4.50                06/08/2021                 LABIL2                   1.7                 07/18/2020                 AST                      18                  06/08/2021                 ALT                      26                  06/08/2021              Hyperlipidemia  Current medication Pravastatin  Tolerating medication: Yes  Needs Refill: Yes    Lab Results       Component                Value               Date                       CHOL                     205 (H)             06/08/2021                 CHLPL                    228 (H)             07/18/2020                 TRIG                     118                 06/08/2021                 HDL                      60                  06/08/2021                 LDL                      124 (H)             06/08/2021                Diabetes:  Current medication Metformin 2 daily   Tolerating medication:  Yes  Last eye exam due, vision works   Feet no problems   At home BS ranges: 120's   Lab Results       Component                Value               Date                       HGBA1C                   6.90 (H)            2021              Low back pain with sciatica: has feet pain, takes diclofenac / helps and new shoes     PAST MEDICAL HISTORY changes since : none         Pulmonary Embolism: x 2;     Gestational Diabetes     Prothrombin gene mutation (MTHFR) one gene heterozygous/Liden 2     Colon cancer: dx'd in 2004;         GYNECOLOGICAL HISTORY:     miscarriage 1         Surgical History:         Hernia Repair: 2009; Ventral hernia repair;     Appendectomy: ;     Colon Resection: ; oophrectmy/salp;     colostomy reversl fall ; Procedures: colonoscopy 13 normal     Dilation and Curettage: 17; hysterscopy; Other Surgeries    LiF DIP  index mucous cyst excision  19;     COLONOSCOPY: was last done 17 with normal results [place]         Family History:     Father:  at age 62; Cause of death was MI;  Alcoholism     Mother: Hypertension     Brother(s): 3 brother(s) total; 1 ;  Hypertension;  Drug Abuse ( heroin overdose )     Sister(s): Healthy; 1 sister(s) total;  Colonic Polyps;  Anxiety; Drug Abuse     Paternal Grandfather: Cause of death was MI     Paternal Grandmother: Cause of death was heart/obesity     Maternal Grandfather:  at age 60; Cause of death was MI     Maternal Grandmother:  at age 72; Cause of death was MI;  Type 2 Diabetes         Social History:         Household:   .  Occupation: [employer]     Marital Status:      Children: 3 children                 Review of Systems:  Review of Systems   Constitutional: Negative for fatigue and fever.   Respiratory: Negative for cough and shortness of breath.    Cardiovascular: Negative for chest pain, palpitations and leg swelling.   Neurological: Negative  "for numbness.          Current Outpatient Medications:   •  cholecalciferol (Cholecalciferol) 25 MCG (1000 UT) tablet, Take 2,000 Units by mouth Daily., Disp: , Rfl:   •  diclofenac (VOLTAREN) 75 MG EC tablet, Take 1 tablet by mouth 2 (Two) Times a Day With Meals., Disp: 60 tablet, Rfl: 0  •  hydroCHLOROthiazide (HYDRODIURIL) 25 MG tablet, Take 1 tablet by mouth Daily., Disp: 90 tablet, Rfl: 1  •  loratadine (CLARITIN) 10 MG tablet, Take 10 mg by mouth Daily., Disp: , Rfl:   •  losartan (COZAAR) 50 MG tablet, Take 1 tablet by mouth Daily., Disp: 90 tablet, Rfl: 1  •  metFORMIN ER (GLUCOPHAGE-XR) 500 MG 24 hr tablet, Take 1 tablet by mouth 2 (Two) Times a Day With Meals., Disp: 180 tablet, Rfl: 1  •  metoprolol succinate XL (TOPROL-XL) 50 MG 24 hr tablet, Take 1 tablet by mouth Daily., Disp: 90 tablet, Rfl: 1  •  pravastatin (PRAVACHOL) 40 MG tablet, Take 1 tablet by mouth Daily., Disp: 90 tablet, Rfl: 1  •  valACYclovir (Valtrex) 1000 MG tablet, Take 1 tablet by mouth 2 (Two) Times a Day As Needed for cold sores., Disp: 60 tablet, Rfl: 1  •  Xarelto 20 MG tablet, Take 1 tablet by mouth Daily., Disp: 90 tablet, Rfl: 3    Vital Signs:   Vitals:    12/08/21 1403   BP: 137/60   BP Location: Right arm   Patient Position: Sitting   Pulse: 73   Resp: 20   Temp: 98.1 °F (36.7 °C)   SpO2: 98%   Weight: 92.5 kg (204 lb)   Height: 165.1 cm (65\")   PainSc:   2         Physical Exam:  Physical Exam  Vitals reviewed.   Constitutional:       General: She is not in acute distress.     Appearance: Normal appearance.   Neck:      Vascular: No carotid bruit.   Cardiovascular:      Rate and Rhythm: Normal rate and regular rhythm.      Heart sounds: Normal heart sounds. No murmur heard.      Pulmonary:      Effort: Pulmonary effort is normal. No respiratory distress.      Breath sounds: Normal breath sounds.   Musculoskeletal:      Right lower leg: No edema.      Left lower leg: No edema.   Neurological:      Mental Status: She is " alert.   Psychiatric:         Mood and Affect: Mood normal.         Behavior: Behavior normal.         Result Review      The following data was reviewed by: ELLI Savage on 12/08/2021:    Results for orders placed or performed in visit on 09/15/21   Urinalysis without microscopic (no culture) - Urine, Clean Catch    Specimen: Urine, Clean Catch   Result Value Ref Range    Color, UA Yellow Yellow, Straw    Appearance, UA Slightly Cloudy (A) Clear    pH, UA 6.0 5.0 - 8.0    Specific Gravity, UA 1.025 1.005 - 1.030    Glucose, UA Negative Negative    Ketones, UA Negative Negative    Bilirubin, UA Negative Negative    Blood, UA Negative Negative    Protein, UA Negative Negative    Leuk Esterase, UA Moderate (2+) (A) Negative    Nitrite, UA Negative Negative    Urobilinogen, UA 0.2 E.U./dL 0.2 - 1.0 E.U./dL   Urinalysis, Microscopic Only - Urine, Clean Catch    Specimen: Urine, Clean Catch   Result Value Ref Range    RBC, UA 0-2 (A) None Seen /HPF    WBC, UA 6-12 (A) None Seen /HPF    Bacteria, UA Trace (A) None Seen /HPF    Squamous Epithelial Cells, UA 3-6 (A) None Seen, 0-2 /HPF    Hyaline Casts, UA None Seen None Seen /LPF    Methodology Manual Light Microscopy                Assessment and Plan:          Diagnoses and all orders for this visit:    1. Need for influenza vaccination (Primary)  -     Cancel: FluLaval/Fluarix/Fluzone >6 Months ([id number])  -     FluLaval/Fluarix/Fluzone >6 Months ([id number])    2. Type 2 diabetes mellitus without complication, without long-term current use of insulin (HCC)  Assessment & Plan:  To return fasting, To work on diet, regular exercise, monitor sugars, check feet daily, see optometrist yearly or as directed.      Orders:  -     Cancel: Comprehensive Metabolic Panel; Future  -     Cancel: Lipid Panel; Future  -     Cancel: Hemoglobin A1c; Future  -     Comprehensive Metabolic Panel; Future  -     Hemoglobin A1c; Future  -     Lipid Panel; Future  -     metFORMIN  ER (GLUCOPHAGE-XR) 500 MG 24 hr tablet; Take 1 tablet by mouth 2 (Two) Times a Day With Meals.  Dispense: 180 tablet; Refill: 1    3. Other hyperlipidemia  Assessment & Plan:  Continue current medication and efforts with diet and exercise.       Orders:  -     pravastatin (PRAVACHOL) 40 MG tablet; Take 1 tablet by mouth Daily.  Dispense: 90 tablet; Refill: 1    4. Essential hypertension  Assessment & Plan:  Hypertension is stable. Continue to monitor BP at home. Continue current meds. Continue to modify diet and lifestyle.    Orders:  -     metoprolol succinate XL (TOPROL-XL) 50 MG 24 hr tablet; Take 1 tablet by mouth Daily.  Dispense: 90 tablet; Refill: 1  -     losartan (COZAAR) 50 MG tablet; Take 1 tablet by mouth Daily.  Dispense: 90 tablet; Refill: 1  -     hydroCHLOROthiazide (HYDRODIURIL) 25 MG tablet; Take 1 tablet by mouth Daily.  Dispense: 90 tablet; Refill: 1    5. Heel pain, chronic, left  Assessment & Plan:  Continue diclofenac for her feet and back, stay well hydrated and follow up if anything changes       6. Plantar fasciitis  -     diclofenac (VOLTAREN) 75 MG EC tablet; Take 1 tablet by mouth 2 (Two) Times a Day With Meals.  Dispense: 60 tablet; Refill: 0        Follow Up   Return for fasting for labs.  Patient was given instructions and counseling regarding her condition or for health maintenance advice. Please see specific information pulled into the AVS if appropriate.

## 2021-12-10 RX ORDER — PRAVASTATIN SODIUM 40 MG
TABLET ORAL
Qty: 90 TABLET | Refills: 1 | OUTPATIENT
Start: 2021-12-10

## 2021-12-20 ENCOUNTER — LAB (OUTPATIENT)
Dept: LAB | Facility: HOSPITAL | Age: 56
End: 2021-12-20

## 2021-12-20 DIAGNOSIS — E11.9 TYPE 2 DIABETES MELLITUS WITHOUT COMPLICATION, WITHOUT LONG-TERM CURRENT USE OF INSULIN (HCC): ICD-10-CM

## 2021-12-20 LAB
ALBUMIN SERPL-MCNC: 4.5 G/DL (ref 3.5–5.2)
ALBUMIN/GLOB SERPL: 1.9 G/DL
ALP SERPL-CCNC: 100 U/L (ref 39–117)
ALT SERPL W P-5'-P-CCNC: 35 U/L (ref 1–33)
ANION GAP SERPL CALCULATED.3IONS-SCNC: 7.8 MMOL/L (ref 5–15)
AST SERPL-CCNC: 18 U/L (ref 1–32)
BILIRUB SERPL-MCNC: 0.6 MG/DL (ref 0–1.2)
BUN SERPL-MCNC: 13 MG/DL (ref 6–20)
BUN/CREAT SERPL: 20.6 (ref 7–25)
CALCIUM SPEC-SCNC: 9.4 MG/DL (ref 8.6–10.5)
CHLORIDE SERPL-SCNC: 102 MMOL/L (ref 98–107)
CHOLEST SERPL-MCNC: 178 MG/DL (ref 0–200)
CO2 SERPL-SCNC: 29.2 MMOL/L (ref 22–29)
CREAT SERPL-MCNC: 0.63 MG/DL (ref 0.57–1)
GFR SERPL CREATININE-BSD FRML MDRD: 98 ML/MIN/1.73
GLOBULIN UR ELPH-MCNC: 2.4 GM/DL
GLUCOSE SERPL-MCNC: 136 MG/DL (ref 65–99)
HBA1C MFR BLD: 6.77 % (ref 4.8–5.6)
HDLC SERPL-MCNC: 57 MG/DL (ref 40–60)
LDLC SERPL CALC-MCNC: 102 MG/DL (ref 0–100)
LDLC/HDLC SERPL: 1.74 {RATIO}
POTASSIUM SERPL-SCNC: 3.6 MMOL/L (ref 3.5–5.2)
PROT SERPL-MCNC: 6.9 G/DL (ref 6–8.5)
SODIUM SERPL-SCNC: 139 MMOL/L (ref 136–145)
TRIGL SERPL-MCNC: 108 MG/DL (ref 0–150)
VLDLC SERPL-MCNC: 19 MG/DL (ref 5–40)

## 2021-12-20 PROCEDURE — 83036 HEMOGLOBIN GLYCOSYLATED A1C: CPT

## 2021-12-20 PROCEDURE — 80061 LIPID PANEL: CPT

## 2021-12-20 PROCEDURE — 80053 COMPREHEN METABOLIC PANEL: CPT

## 2021-12-20 PROCEDURE — 36415 COLL VENOUS BLD VENIPUNCTURE: CPT

## 2022-01-24 DIAGNOSIS — M72.2 PLANTAR FASCIITIS: ICD-10-CM

## 2022-01-24 RX ORDER — DICLOFENAC SODIUM 75 MG/1
TABLET, DELAYED RELEASE ORAL
Qty: 60 TABLET | Refills: 0 | Status: SHIPPED | OUTPATIENT
Start: 2022-01-24 | End: 2022-02-21

## 2022-01-24 NOTE — TELEPHONE ENCOUNTER
Rx Refill Note  Requested Prescriptions     Pending Prescriptions Disp Refills   • diclofenac (VOLTAREN) 75 MG EC tablet [Pharmacy Med Name: DICLOFENAC SOD EC 75 MG TAB] 60 tablet 0     Sig: TAKE 1 TABLET BY MOUTH TWICE A DAY WITH MEALS      Last office visit with prescribing clinician: 12/8/2021      Next office visit with prescribing clinician: Visit date not found       {TIP  Please add Last Relevant Lab Date if appropriate: 12/20/2021    Virginia Calvo  01/24/22, 11:16 EST

## 2022-02-20 DIAGNOSIS — M72.2 PLANTAR FASCIITIS: ICD-10-CM

## 2022-02-21 RX ORDER — DICLOFENAC SODIUM 75 MG/1
TABLET, DELAYED RELEASE ORAL
Qty: 60 TABLET | Refills: 0 | Status: SHIPPED | OUTPATIENT
Start: 2022-02-21 | End: 2022-03-21

## 2022-02-21 NOTE — TELEPHONE ENCOUNTER
LV:12/8/21  LF:1/24/22 #60    Lab Results   Component Value Date    GLUCOSE 136 (H) 12/20/2021    BUN 13 12/20/2021    CREATININE 0.63 12/20/2021    EGFRIFNONA 98 12/20/2021    BCR 20.6 12/20/2021    K 3.6 12/20/2021    CO2 29.2 (H) 12/20/2021    CALCIUM 9.4 12/20/2021    ALBUMIN 4.50 12/20/2021    LABIL2 1.7 07/18/2020    AST 18 12/20/2021    ALT 35 (H) 12/20/2021

## 2022-03-20 DIAGNOSIS — M72.2 PLANTAR FASCIITIS: ICD-10-CM

## 2022-03-21 RX ORDER — DICLOFENAC SODIUM 75 MG/1
TABLET, DELAYED RELEASE ORAL
Qty: 60 TABLET | Refills: 0 | Status: SHIPPED | OUTPATIENT
Start: 2022-03-21 | End: 2022-04-18

## 2022-03-24 ENCOUNTER — OFFICE VISIT (OUTPATIENT)
Dept: FAMILY MEDICINE CLINIC | Age: 57
End: 2022-03-24

## 2022-03-24 VITALS
DIASTOLIC BLOOD PRESSURE: 70 MMHG | TEMPERATURE: 98.1 F | SYSTOLIC BLOOD PRESSURE: 150 MMHG | BODY MASS INDEX: 34.11 KG/M2 | WEIGHT: 205 LBS | HEART RATE: 92 BPM

## 2022-03-24 DIAGNOSIS — J01.00 ACUTE MAXILLARY SINUSITIS, RECURRENCE NOT SPECIFIED: Primary | ICD-10-CM

## 2022-03-24 PROCEDURE — 99213 OFFICE O/P EST LOW 20 MIN: CPT | Performed by: NURSE PRACTITIONER

## 2022-03-24 RX ORDER — CEFDINIR 300 MG/1
300 CAPSULE ORAL 2 TIMES DAILY
Qty: 20 CAPSULE | Refills: 0 | Status: SHIPPED | OUTPATIENT
Start: 2022-03-24 | End: 2022-06-14

## 2022-03-24 NOTE — PROGRESS NOTES
Marilou Edwards presents to [facility].    Chief Complaint:  Sinusitis (Sinus pressure, congestion. Pt states at first it was allergies then turned into sinus.)         History of Present Illness:  URI  When did symptoms start: 6 days   Any exposures:none   Symptoms:allergy sinus symptoms, cough, drainage green, sinus pain /hoarse  Treatment tried:Mucinex /Tyneol /OTC allergy rx       PAST MEDICAL HISTORY changes since : none         Pulmonary Embolism: x 2;     Gestational Diabetes     Prothrombin gene mutation (MTHFR) one gene heterozygous/Liden 2     Colon cancer: dx'd in 2004;         GYNECOLOGICAL HISTORY:     miscarriage 1         Surgical History:         Hernia Repair: 2009; Ventral hernia repair;     Appendectomy: ;     Colon Resection: /; oophrectmy/salp;     colostomy reversl fall ; Procedures: colonoscopy 13 normal     Dilation and Curettage: 17; hysterscopy; Other Surgeries    LiF DIP  index mucous cyst excision  19;     COLONOSCOPY: was last done 17 with normal results [place]         Family History:     Father:  at age 62; Cause of death was MI;  Alcoholism     Mother: Hypertension     Brother(s): 3 brother(s) total; 1 ;  Hypertension;  Drug Abuse ( heroin overdose )     Sister(s): Healthy; 1 sister(s) total;  Colonic Polyps;  Anxiety; Drug Abuse     Paternal Grandfather: Cause of death was MI     Paternal Grandmother: Cause of death was heart/obesity     Maternal Grandfather:  at age 60; Cause of death was MI     Maternal Grandmother:  at age 72; Cause of death was MI;  Type 2 Diabetes         Social History:         Household:   .  Occupation: [employer]     Marital Status:      Children: 3 children                    Review of Systems:  Review of Systems   Constitutional: Negative for fever.   HENT: Positive for sneezing and sore throat (scratchy intermittent ).     Respiratory: Negative for shortness of breath.    Cardiovascular: Negative for chest pain, palpitations and leg swelling.   Neurological:        No loss of taste or smell           Current Outpatient Medications:   •  cholecalciferol (VITAMIN D3) 25 MCG (1000 UT) tablet, Take 2,000 Units by mouth Daily., Disp: , Rfl:   •  diclofenac (VOLTAREN) 75 MG EC tablet, TAKE 1 TABLET BY MOUTH TWICE A DAY WITH MEALS, Disp: 60 tablet, Rfl: 0  •  hydroCHLOROthiazide (HYDRODIURIL) 25 MG tablet, Take 1 tablet by mouth Daily., Disp: 90 tablet, Rfl: 1  •  loratadine (CLARITIN) 10 MG tablet, Take 10 mg by mouth Daily., Disp: , Rfl:   •  losartan (COZAAR) 50 MG tablet, Take 1 tablet by mouth Daily., Disp: 90 tablet, Rfl: 1  •  metFORMIN ER (GLUCOPHAGE-XR) 500 MG 24 hr tablet, Take 1 tablet by mouth 2 (Two) Times a Day With Meals., Disp: 180 tablet, Rfl: 1  •  metoprolol succinate XL (TOPROL-XL) 50 MG 24 hr tablet, Take 1 tablet by mouth Daily., Disp: 90 tablet, Rfl: 1  •  pravastatin (PRAVACHOL) 40 MG tablet, Take 1 tablet by mouth Daily., Disp: 90 tablet, Rfl: 1  •  valACYclovir (Valtrex) 1000 MG tablet, Take 1 tablet by mouth 2 (Two) Times a Day As Needed for cold sores., Disp: 60 tablet, Rfl: 1  •  cefdinir (OMNICEF) 300 MG capsule, Take 1 capsule by mouth 2 (Two) Times a Day., Disp: 20 capsule, Rfl: 0  •  Xarelto 20 MG tablet, Take 1 tablet by mouth Daily., Disp: 90 tablet, Rfl: 3    Vital Signs:   Vitals:    03/24/22 1334   BP: 150/70   BP Location: Right arm   Patient Position: Sitting   Pulse: 92   Temp: 98.1 °F (36.7 °C)   TempSrc: Oral   Weight: 93 kg (205 lb)         Physical Exam:  Physical Exam  Constitutional:       General: She is not in acute distress.     Appearance: Normal appearance.   HENT:      Right Ear: Tympanic membrane, ear canal and external ear normal.      Left Ear: Tympanic membrane, ear canal and external ear normal.      Nose: Congestion present.      Comments: Bilaterally maxillary sinus tenderness       Mouth/Throat:      Pharynx: Oropharynx is clear. No posterior oropharyngeal erythema.   Cardiovascular:      Rate and Rhythm: Normal rate and regular rhythm.      Heart sounds: No murmur heard.  Pulmonary:      Effort: Pulmonary effort is normal.      Breath sounds: Normal breath sounds.   Lymphadenopathy:      Cervical: No cervical adenopathy.   Neurological:      Mental Status: She is alert.   Psychiatric:         Mood and Affect: Mood normal.         Behavior: Behavior normal.         Result Review      The following data was reviewed by: ELLI Savage on 03/24/2022:    Results for orders placed or performed in visit on 12/20/21   Lipid Panel    Specimen: Blood   Result Value Ref Range    Total Cholesterol 178 0 - 200 mg/dL    Triglycerides 108 0 - 150 mg/dL    HDL Cholesterol 57 40 - 60 mg/dL    LDL Cholesterol  102 (H) 0 - 100 mg/dL    VLDL Cholesterol 19 5 - 40 mg/dL    LDL/HDL Ratio 1.74    Comprehensive Metabolic Panel    Specimen: Blood   Result Value Ref Range    Glucose 136 (H) 65 - 99 mg/dL    BUN 13 6 - 20 mg/dL    Creatinine 0.63 0.57 - 1.00 mg/dL    Sodium 139 136 - 145 mmol/L    Potassium 3.6 3.5 - 5.2 mmol/L    Chloride 102 98 - 107 mmol/L    CO2 29.2 (H) 22.0 - 29.0 mmol/L    Calcium 9.4 8.6 - 10.5 mg/dL    Total Protein 6.9 6.0 - 8.5 g/dL    Albumin 4.50 3.50 - 5.20 g/dL    ALT (SGPT) 35 (H) 1 - 33 U/L    AST (SGOT) 18 1 - 32 U/L    Alkaline Phosphatase 100 39 - 117 U/L    Total Bilirubin 0.6 0.0 - 1.2 mg/dL    eGFR Non African Amer 98 >60 mL/min/1.73    Globulin 2.4 gm/dL    A/G Ratio 1.9 g/dL    BUN/Creatinine Ratio 20.6 7.0 - 25.0    Anion Gap 7.8 5.0 - 15.0 mmol/L   Hemoglobin A1c    Specimen: Blood   Result Value Ref Range    Hemoglobin A1C 6.77 (H) 4.80 - 5.60 %               Assessment and Plan:          Diagnoses and all orders for this visit:    1. Acute maxillary sinusitis, recurrence not specified (Primary)  Assessment & Plan:  Rest, increase fluids, follow up if symptoms  progress or change   Continue current rx's OTC     Orders:  -     cefdinir (OMNICEF) 300 MG capsule; Take 1 capsule by mouth 2 (Two) Times a Day.  Dispense: 20 capsule; Refill: 0        Follow Up   Return if symptoms worsen or fail to improve.  Patient was given instructions and counseling regarding her condition or for health maintenance advice. Please see specific information pulled into the AVS if appropriate.

## 2022-04-18 DIAGNOSIS — M72.2 PLANTAR FASCIITIS: ICD-10-CM

## 2022-04-18 RX ORDER — DICLOFENAC SODIUM 75 MG/1
TABLET, DELAYED RELEASE ORAL
Qty: 60 TABLET | Refills: 0 | Status: SHIPPED | OUTPATIENT
Start: 2022-04-18 | End: 2022-05-16

## 2022-04-18 NOTE — TELEPHONE ENCOUNTER
Rx Refill Note  Requested Prescriptions     Pending Prescriptions Disp Refills   • diclofenac (VOLTAREN) 75 MG EC tablet [Pharmacy Med Name: DICLOFENAC SOD EC 75 MG TAB] 60 tablet 0     Sig: TAKE 1 TABLET BY MOUTH TWICE A DAY WITH MEALS      Last office visit with prescribing clinician: 3/24/2022      Next office visit with prescribing clinician: Visit date not found   Maria C Alfonso LPN  04/18/22, 10:39 EDT     LF-3/21/22 #60, RF-0    Lab Results   Component Value Date    GLUCOSE 136 (H) 12/20/2021    BUN 13 12/20/2021    CREATININE 0.63 12/20/2021    EGFRIFNONA 98 12/20/2021    BCR 20.6 12/20/2021    K 3.6 12/20/2021    CO2 29.2 (H) 12/20/2021    CALCIUM 9.4 12/20/2021    ALBUMIN 4.50 12/20/2021    LABIL2 1.7 07/18/2020    AST 18 12/20/2021    ALT 35 (H) 12/20/2021

## 2022-05-16 DIAGNOSIS — M72.2 PLANTAR FASCIITIS: ICD-10-CM

## 2022-05-16 RX ORDER — DICLOFENAC SODIUM 75 MG/1
TABLET, DELAYED RELEASE ORAL
Qty: 60 TABLET | Refills: 0 | Status: SHIPPED | OUTPATIENT
Start: 2022-05-16 | End: 2022-06-16

## 2022-05-16 NOTE — TELEPHONE ENCOUNTER
Rx Refill Note  Requested Prescriptions     Pending Prescriptions Disp Refills   • diclofenac (VOLTAREN) 75 MG EC tablet [Pharmacy Med Name: DICLOFENAC SOD EC 75 MG TAB] 60 tablet 0     Sig: TAKE 1 TABLET BY MOUTH TWICE A DAY WITH MEALS      Last office visit with prescribing clinician: 3/24/2022      Next office visit with prescribing clinician: Visit date not found    No hemoglobin on file    Last filled: 4/18/22 #60, no refills    Aleksandra York RN  05/16/22, 09:45 EDT

## 2022-05-30 DIAGNOSIS — E78.49 OTHER HYPERLIPIDEMIA: ICD-10-CM

## 2022-05-31 RX ORDER — PRAVASTATIN SODIUM 40 MG
TABLET ORAL
Qty: 90 TABLET | Refills: 1 | Status: SHIPPED | OUTPATIENT
Start: 2022-05-31 | End: 2022-11-23

## 2022-06-13 ENCOUNTER — TELEPHONE (OUTPATIENT)
Dept: FAMILY MEDICINE CLINIC | Age: 57
End: 2022-06-13

## 2022-06-13 NOTE — TELEPHONE ENCOUNTER
Called [employer] PIP claim is open. Insurance updated in chart and scanned in. Claim number is [id number]    [email or web address]  [address] [facility] [address].

## 2022-06-13 NOTE — TELEPHONE ENCOUNTER
Hub to read     I have reached out to [employer] insurance [phone] in regards to seeing if PIP claim is open. Papers are in folder in office if Adjustor calls back. I did advise pt I would have to call to make sure PIP was open. PT appts were made.

## 2022-06-13 NOTE — TELEPHONE ENCOUNTER
Hub to read     I have reached out to [employer] insurance [phone] ext [phone] in regards to seeing if PIP claim is open. Papers are in folder in office if Adjustor calls back. I did advise pt I would have to call to make sure PIP was open. PT appts were made.

## 2022-06-14 ENCOUNTER — OFFICE VISIT (OUTPATIENT)
Dept: FAMILY MEDICINE CLINIC | Age: 57
End: 2022-06-14

## 2022-06-14 VITALS
WEIGHT: 206.6 LBS | DIASTOLIC BLOOD PRESSURE: 82 MMHG | SYSTOLIC BLOOD PRESSURE: 125 MMHG | BODY MASS INDEX: 34.38 KG/M2 | HEART RATE: 74 BPM

## 2022-06-14 DIAGNOSIS — S20.211A CONTUSION OF RIGHT CHEST WALL, INITIAL ENCOUNTER: Primary | ICD-10-CM

## 2022-06-14 PROCEDURE — 99213 OFFICE O/P EST LOW 20 MIN: CPT | Performed by: NURSE PRACTITIONER
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
## 2022-06-14 NOTE — ASSESSMENT & PLAN NOTE
Continue to use tyelnol, try flexeril, she has at home, ice/ heat, rest, reviewed records they brought from ER, if not continuing to improved consider PT or further evaluation

## 2022-06-16 DIAGNOSIS — M72.2 PLANTAR FASCIITIS: ICD-10-CM

## 2022-06-16 RX ORDER — DICLOFENAC SODIUM 75 MG/1
75 TABLET, DELAYED RELEASE ORAL 2 TIMES DAILY WITH MEALS
Qty: 180 TABLET | Refills: 1 | Status: SHIPPED | OUTPATIENT
Start: 2022-06-16 | End: 2023-02-16

## 2022-06-16 NOTE — TELEPHONE ENCOUNTER
Rx Refill Note  Requested Prescriptions     Pending Prescriptions Disp Refills   • diclofenac (VOLTAREN) 75 MG EC tablet [Pharmacy Med Name: DICLOFENAC SOD EC 75 MG TAB] 60 tablet 0     Sig: TAKE 1 TABLET BY MOUTH TWICE A DAY WITH MEALS      Last office visit with prescribing clinician: 6/14/2022      Next office visit with prescribing clinician: Visit date not found   Lab:Comprehensive Metabolic Panel (12/20/2021 08:40)    Shoshana Rashid MA  06/16/22, 08:56 EDT

## 2022-09-25 DIAGNOSIS — I10 ESSENTIAL HYPERTENSION: ICD-10-CM

## 2022-09-25 DIAGNOSIS — E11.9 TYPE 2 DIABETES MELLITUS WITHOUT COMPLICATION, WITHOUT LONG-TERM CURRENT USE OF INSULIN: ICD-10-CM

## 2022-09-26 NOTE — TELEPHONE ENCOUNTER
LV:6/14/22    Lab Results   Component Value Date    GLUCOSE 136 (H) 12/20/2021    BUN 13 12/20/2021    CREATININE 0.63 12/20/2021    EGFRIFNONA 98 12/20/2021    BCR 20.6 12/20/2021    K 3.6 12/20/2021    CO2 29.2 (H) 12/20/2021    CALCIUM 9.4 12/20/2021    ALBUMIN 4.50 12/20/2021    LABIL2 1.7 07/18/2020    AST 18 12/20/2021    ALT 35 (H) 12/20/2021       Lab Results   Component Value Date    HGBA1C 6.77 (H) 12/20/2021

## 2022-09-27 RX ORDER — METOPROLOL SUCCINATE 50 MG/1
50 TABLET, EXTENDED RELEASE ORAL DAILY
Qty: 90 TABLET | Refills: 0 | Status: SHIPPED | OUTPATIENT
Start: 2022-09-27 | End: 2022-12-27

## 2022-09-27 RX ORDER — HYDROCHLOROTHIAZIDE 25 MG/1
25 TABLET ORAL DAILY
Qty: 90 TABLET | Refills: 0 | Status: SHIPPED | OUTPATIENT
Start: 2022-09-27 | End: 2022-12-27

## 2022-09-27 RX ORDER — LOSARTAN POTASSIUM 50 MG/1
50 TABLET ORAL DAILY
Qty: 90 TABLET | Refills: 0 | Status: SHIPPED | OUTPATIENT
Start: 2022-09-27 | End: 2022-12-27

## 2022-09-27 RX ORDER — METFORMIN HYDROCHLORIDE 500 MG/1
500 TABLET, EXTENDED RELEASE ORAL 2 TIMES DAILY WITH MEALS
Qty: 180 TABLET | Refills: 0 | Status: SHIPPED | OUTPATIENT
Start: 2022-09-27 | End: 2022-12-27

## 2022-10-03 ENCOUNTER — LAB (OUTPATIENT)
Dept: LAB | Facility: HOSPITAL | Age: 57
End: 2022-10-03

## 2022-10-03 ENCOUNTER — OFFICE VISIT (OUTPATIENT)
Dept: FAMILY MEDICINE CLINIC | Age: 57
End: 2022-10-03

## 2022-10-03 VITALS
WEIGHT: 204.5 LBS | DIASTOLIC BLOOD PRESSURE: 67 MMHG | OXYGEN SATURATION: 99 % | TEMPERATURE: 97.9 F | BODY MASS INDEX: 34.07 KG/M2 | HEIGHT: 65 IN | HEART RATE: 68 BPM | SYSTOLIC BLOOD PRESSURE: 144 MMHG

## 2022-10-03 DIAGNOSIS — E78.49 OTHER HYPERLIPIDEMIA: ICD-10-CM

## 2022-10-03 DIAGNOSIS — E11.9 TYPE 2 DIABETES MELLITUS WITHOUT COMPLICATION, WITHOUT LONG-TERM CURRENT USE OF INSULIN: ICD-10-CM

## 2022-10-03 DIAGNOSIS — M25.50 ARTHRALGIA, UNSPECIFIED JOINT: ICD-10-CM

## 2022-10-03 DIAGNOSIS — E11.9 TYPE 2 DIABETES MELLITUS WITHOUT COMPLICATION, WITHOUT LONG-TERM CURRENT USE OF INSULIN: Primary | ICD-10-CM

## 2022-10-03 DIAGNOSIS — I10 ESSENTIAL HYPERTENSION: ICD-10-CM

## 2022-10-03 DIAGNOSIS — Z12.31 SCREENING MAMMOGRAM FOR BREAST CANCER: ICD-10-CM

## 2022-10-03 LAB
ALBUMIN SERPL-MCNC: 4.3 G/DL (ref 3.5–5.2)
ALBUMIN UR-MCNC: <1.2 MG/DL
ALBUMIN/GLOB SERPL: 1.9 G/DL
ALP SERPL-CCNC: 106 U/L (ref 39–117)
ALT SERPL W P-5'-P-CCNC: 41 U/L (ref 1–33)
ANION GAP SERPL CALCULATED.3IONS-SCNC: 9.2 MMOL/L (ref 5–15)
AST SERPL-CCNC: 23 U/L (ref 1–32)
BILIRUB SERPL-MCNC: 0.5 MG/DL (ref 0–1.2)
BUN SERPL-MCNC: 11 MG/DL (ref 6–20)
BUN/CREAT SERPL: 17.5 (ref 7–25)
CALCIUM SPEC-SCNC: 9.2 MG/DL (ref 8.6–10.5)
CHLORIDE SERPL-SCNC: 100 MMOL/L (ref 98–107)
CHOLEST SERPL-MCNC: 209 MG/DL (ref 0–200)
CO2 SERPL-SCNC: 28.8 MMOL/L (ref 22–29)
CREAT SERPL-MCNC: 0.63 MG/DL (ref 0.57–1)
CREAT UR-MCNC: 110.2 MG/DL
EGFRCR SERPLBLD CKD-EPI 2021: 103.6 ML/MIN/1.73
GLOBULIN UR ELPH-MCNC: 2.3 GM/DL
GLUCOSE SERPL-MCNC: 139 MG/DL (ref 65–99)
HBA1C MFR BLD: 7 % (ref 4.8–5.6)
HDLC SERPL-MCNC: 69 MG/DL (ref 40–60)
LDLC SERPL CALC-MCNC: 119 MG/DL (ref 0–100)
LDLC/HDLC SERPL: 1.68 {RATIO}
MICROALBUMIN/CREAT UR: NORMAL MG/G{CREAT}
POTASSIUM SERPL-SCNC: 3.8 MMOL/L (ref 3.5–5.2)
PROT SERPL-MCNC: 6.6 G/DL (ref 6–8.5)
SODIUM SERPL-SCNC: 138 MMOL/L (ref 136–145)
TRIGL SERPL-MCNC: 120 MG/DL (ref 0–150)
VLDLC SERPL-MCNC: 21 MG/DL (ref 5–40)

## 2022-10-03 PROCEDURE — 83036 HEMOGLOBIN GLYCOSYLATED A1C: CPT

## 2022-10-03 PROCEDURE — 80053 COMPREHEN METABOLIC PANEL: CPT

## 2022-10-03 PROCEDURE — 99214 OFFICE O/P EST MOD 30 MIN: CPT | Performed by: NURSE PRACTITIONER

## 2022-10-03 PROCEDURE — 80061 LIPID PANEL: CPT

## 2022-10-03 PROCEDURE — 82043 UR ALBUMIN QUANTITATIVE: CPT

## 2022-10-03 PROCEDURE — 36415 COLL VENOUS BLD VENIPUNCTURE: CPT

## 2022-10-03 PROCEDURE — 82570 ASSAY OF URINE CREATININE: CPT

## 2022-10-03 RX ORDER — FLUTICASONE PROPIONATE 50 MCG
SPRAY, SUSPENSION (ML) NASAL DAILY
COMMUNITY

## 2022-10-03 NOTE — ASSESSMENT & PLAN NOTE
To work on diet, regular exercise, monitor sugars, check feet daily, set up optometrist yearly appt

## 2022-10-03 NOTE — PROGRESS NOTES
Marilou Edwards presents to [facility].    Chief Complaint:  check up   Follow up / rx's/ labs        History of Present Illness:  Diabetes:  Current medication: metformin   Tolerating medication: Yes  Last eye exam: over 2 years, vision works   Last foot exam: > 1 year, has no feet pain now   At home BS ranges: 120's  Lab Results       Component                Value               Date                       HGBA1C                   6.77 (H)            12/20/2021              Hypertension:  Current medication: losartan, HCTZ, toprol XL   Tolerating Medication: Yes  Checking BP at home and it is: not checking   Needs refills: No  Labs:  Lab Results       Component                Value               Date                       GLUCOSE                  136 (H)             12/20/2021                 BUN                      13                  12/20/2021                 CREATININE               0.63                12/20/2021                 EGFRIFNONA               98                  12/20/2021                 BCR                      20.6                12/20/2021                 K                        3.6                 12/20/2021                 CO2                      29.2 (H)            12/20/2021                 CALCIUM                  9.4                 12/20/2021                 ALBUMIN                  4.50                12/20/2021                 LABIL2                   1.7                 07/18/2020                 AST                      18                  12/20/2021                 ALT                      35 (H)              12/20/2021              Hyperlipidemia  Current medication: pravastatin   Tolerating medication: Yes   Needs Refill: No    Lab Results       Component                Value               Date                       CHOL                     178                 12/20/2021                 CHLPL                    228 (H)             07/18/2020                  TRIG                     108                 2021                 HDL                      57                  2021                 LDL                      102 (H)             2021                  PAST MEDICAL HISTORY changes since :         Pulmonary Embolism: x 2;     Gestational Diabetes     Prothrombin gene mutation (MTHFR) one gene heterozygous/Liden 2     Colon cancer: dx'd in 2004;         GYNECOLOGICAL HISTORY:     miscarriage 1         Surgical History:         Hernia Repair: 2009; Ventral hernia repair;     Appendectomy: ;     Colon Resection: ; oophrectmy/salp;     colostomy reversl fall ; Procedures: colonoscopy 13 normal     Dilation and Curettage: 17; hysterscopy; Other Surgeries    LiF DIP  index mucous cyst excision  19;     COLONOSCOPY: was last done 8-3-22/ normal per pt ;  17 with normal results [place]         Family History:     Father:  at age 62; Cause of death was MI;  Alcoholism     Mother: Hypertension     Brother(s): 3 brother(s) total; 1 ;  Hypertension;  Drug Abuse ( heroin overdose )     Sister(s): Healthy; 1 sister(s) total;  Colonic Polyps;  Anxiety; Drug Abuse     Paternal Grandfather: Cause of death was MI     Paternal Grandmother: Cause of death was heart/obesity     Maternal Grandfather:  at age 60; Cause of death was MI     Maternal Grandmother:  at age 72; Cause of death was MI;  Type 2 Diabetes         Social History:         Household:   .  Occupation: [employer]     Marital Status:      Children: 3 children       Review of Systems:  Review of Systems   Constitutional: Negative for fatigue and fever.   HENT: Negative for ear pain and sore throat.    Eyes: Negative for blurred vision.   Respiratory: Negative for cough and shortness of breath.    Cardiovascular: Negative for chest pain, palpitations and leg swelling.   Genitourinary: Negative for  "breast lump (but behind on her mammogram, did see radha gallagher ).   Musculoskeletal: Positive for arthralgias (hand pains ).   Neurological: Negative for weakness.          Current Outpatient Medications:   •  cholecalciferol (VITAMIN D3) 25 MCG (1000 UT) tablet, Take 2,000 Units by mouth Daily., Disp: , Rfl:   •  diclofenac (VOLTAREN) 75 MG EC tablet, Take 1 tablet by mouth 2 (Two) Times a Day With Meals. Next appt due in December, Disp: 180 tablet, Rfl: 1  •  fluticasone (FLONASE) 50 MCG/ACT nasal spray, Daily., Disp: , Rfl:   •  hydroCHLOROthiazide (HYDRODIURIL) 25 MG tablet, Take 1 tablet by mouth Daily., Disp: 90 tablet, Rfl: 0  •  loratadine (CLARITIN) 10 MG tablet, Take 10 mg by mouth Daily., Disp: , Rfl:   •  losartan (COZAAR) 50 MG tablet, Take 1 tablet by mouth Daily., Disp: 90 tablet, Rfl: 0  •  metFORMIN ER (GLUCOPHAGE-XR) 500 MG 24 hr tablet, Take 1 tablet by mouth 2 (Two) Times a Day With Meals., Disp: 180 tablet, Rfl: 0  •  metoprolol succinate XL (TOPROL-XL) 50 MG 24 hr tablet, Take 1 tablet by mouth Daily., Disp: 90 tablet, Rfl: 0  •  pravastatin (PRAVACHOL) 40 MG tablet, TAKE 1 TABLET BY MOUTH EVERY DAY, Disp: 90 tablet, Rfl: 1  •  rivaroxaban (Xarelto) 20 MG tablet, Take 1 tablet by mouth Daily., Disp: 90 tablet, Rfl: 0  •  valACYclovir (Valtrex) 1000 MG tablet, Take 1 tablet by mouth 2 (Two) Times a Day As Needed for cold sores., Disp: 60 tablet, Rfl: 1    Vital Signs:   Vitals:    10/03/22 0842   BP: 144/67   BP Location: Left arm   Patient Position: Sitting   Cuff Size: Adult   Pulse: 68   Temp: 97.9 °F (36.6 °C)   TempSrc: Oral   SpO2: 99%   Weight: 92.8 kg (204 lb 8 oz)   Height: 165.1 cm (65\")         Physical Exam:  Physical Exam  Constitutional:       Appearance: Normal appearance.   Neck:      Vascular: No carotid bruit.   Cardiovascular:      Rate and Rhythm: Normal rate and regular rhythm.      Pulses:           Posterior tibial pulses are 2+ on the right side and 2+ on the left side.    "   Heart sounds: No murmur heard.  Pulmonary:      Effort: No respiratory distress.      Breath sounds: Normal breath sounds.   Musculoskeletal:         General: No swelling.      Comments: Mild pain in fingers with ROM    Feet:      Right foot:      Protective Sensation: 3 sites tested. 3 sites sensed.      Skin integrity: Callus (tiny adjacent to fifth toe ) present. No ulcer or blister.      Toenail Condition: Right toenails are normal.      Left foot:      Protective Sensation: 3 sites tested. 3 sites sensed.      Skin integrity: Callus (tiny adjacent to fifth toe ) present. No ulcer or blister.      Toenail Condition: Left toenails are normal.      Comments: Diabetic Foot Exam Performed and Monofilament Test Performed     Neurological:      Mental Status: She is alert.   Psychiatric:         Mood and Affect: Mood normal.         Thought Content: Thought content normal.         Result Review      The following data was reviewed by: ELLI Savage on 10/03/2022:    Results for orders placed or performed in visit on 12/20/21   Lipid Panel    Specimen: Blood   Result Value Ref Range    Total Cholesterol 178 0 - 200 mg/dL    Triglycerides 108 0 - 150 mg/dL    HDL Cholesterol 57 40 - 60 mg/dL    LDL Cholesterol  102 (H) 0 - 100 mg/dL    VLDL Cholesterol 19 5 - 40 mg/dL    LDL/HDL Ratio 1.74    Comprehensive Metabolic Panel    Specimen: Blood   Result Value Ref Range    Glucose 136 (H) 65 - 99 mg/dL    BUN 13 6 - 20 mg/dL    Creatinine 0.63 0.57 - 1.00 mg/dL    Sodium 139 136 - 145 mmol/L    Potassium 3.6 3.5 - 5.2 mmol/L    Chloride 102 98 - 107 mmol/L    CO2 29.2 (H) 22.0 - 29.0 mmol/L    Calcium 9.4 8.6 - 10.5 mg/dL    Total Protein 6.9 6.0 - 8.5 g/dL    Albumin 4.50 3.50 - 5.20 g/dL    ALT (SGPT) 35 (H) 1 - 33 U/L    AST (SGOT) 18 1 - 32 U/L    Alkaline Phosphatase 100 39 - 117 U/L    Total Bilirubin 0.6 0.0 - 1.2 mg/dL    eGFR Non African Amer 98 >60 mL/min/1.73    Globulin 2.4 gm/dL    A/G Ratio 1.9  g/dL    BUN/Creatinine Ratio 20.6 7.0 - 25.0    Anion Gap 7.8 5.0 - 15.0 mmol/L   Hemoglobin A1c    Specimen: Blood   Result Value Ref Range    Hemoglobin A1C 6.77 (H) 4.80 - 5.60 %               Assessment and Plan:          Diagnoses and all orders for this visit:    1. Type 2 diabetes mellitus without complication, without long-term current use of insulin (HCC) (Primary)  Assessment & Plan:  To work on diet, regular exercise, monitor sugars, check feet daily, set up optometrist yearly appt    Orders:  -     Comprehensive Metabolic Panel; Future  -     Lipid Panel; Future  -     Hemoglobin A1c; Future  -     Microalbumin / Creatinine Urine Ratio - Urine, Clean Catch; Future    2. Essential hypertension  Assessment & Plan:  Hypertension is stable.  to monitor BP at home. Continue current meds. Continue to modify diet and lifestyle. Will need labs every 6 months and follow up.         3. Other hyperlipidemia  Assessment & Plan:  Continue current medication and efforts with diet and exercise.         4. Screening mammogram for breast cancer  -     Mammo Screening Digital Tomosynthesis Bilateral With CAD; Future    5. Arthralgia, unspecified joint  Assessment & Plan:  Will restart her diclofenac she did take for her feet pain, to see if it helps her hands           Follow Up   Return for followup pending lab results.  Patient was given instructions and counseling regarding her condition or for health maintenance advice. Please see specific information pulled into the AVS if appropriate.

## 2022-10-05 ENCOUNTER — TELEPHONE (OUTPATIENT)
Dept: FAMILY MEDICINE CLINIC | Age: 57
End: 2022-10-05

## 2022-10-07 ENCOUNTER — TRANSCRIBE ORDERS (OUTPATIENT)
Dept: ADMINISTRATIVE | Facility: HOSPITAL | Age: 57
End: 2022-10-07

## 2022-10-07 ENCOUNTER — LAB (OUTPATIENT)
Dept: LAB | Facility: HOSPITAL | Age: 57
End: 2022-10-07

## 2022-10-07 DIAGNOSIS — C18.6 MALIGNANT NEOPLASM OF DESCENDING COLON: ICD-10-CM

## 2022-10-07 DIAGNOSIS — C18.6 MALIGNANT NEOPLASM OF DESCENDING COLON: Primary | ICD-10-CM

## 2022-10-07 LAB
ALBUMIN SERPL-MCNC: 4.5 G/DL (ref 3.5–5.2)
ALBUMIN/GLOB SERPL: 2 G/DL
ALP SERPL-CCNC: 112 U/L (ref 39–117)
ALT SERPL W P-5'-P-CCNC: 94 U/L (ref 1–33)
ANION GAP SERPL CALCULATED.3IONS-SCNC: 10 MMOL/L (ref 5–15)
AST SERPL-CCNC: 65 U/L (ref 1–32)
BASOPHILS # BLD AUTO: 0.03 10*3/MM3 (ref 0–0.2)
BASOPHILS NFR BLD AUTO: 0.4 % (ref 0–1.5)
BILIRUB SERPL-MCNC: 0.6 MG/DL (ref 0–1.2)
BUN SERPL-MCNC: 12 MG/DL (ref 6–20)
BUN/CREAT SERPL: 19 (ref 7–25)
CALCIUM SPEC-SCNC: 9.9 MG/DL (ref 8.6–10.5)
CEA SERPL-MCNC: 1.88 NG/ML
CHLORIDE SERPL-SCNC: 102 MMOL/L (ref 98–107)
CO2 SERPL-SCNC: 28 MMOL/L (ref 22–29)
CREAT SERPL-MCNC: 0.63 MG/DL (ref 0.57–1)
DEPRECATED RDW RBC AUTO: 42.8 FL (ref 37–54)
EGFRCR SERPLBLD CKD-EPI 2021: 103.6 ML/MIN/1.73
EOSINOPHIL # BLD AUTO: 0.35 10*3/MM3 (ref 0–0.4)
EOSINOPHIL NFR BLD AUTO: 4.3 % (ref 0.3–6.2)
ERYTHROCYTE [DISTWIDTH] IN BLOOD BY AUTOMATED COUNT: 12 % (ref 12.3–15.4)
GLOBULIN UR ELPH-MCNC: 2.3 GM/DL
GLUCOSE SERPL-MCNC: 159 MG/DL (ref 65–99)
HCT VFR BLD AUTO: 45.7 % (ref 34–46.6)
HGB BLD-MCNC: 15 G/DL (ref 12–15.9)
IMM GRANULOCYTES # BLD AUTO: 0.01 10*3/MM3 (ref 0–0.05)
IMM GRANULOCYTES NFR BLD AUTO: 0.1 % (ref 0–0.5)
LYMPHOCYTES # BLD AUTO: 2.6 10*3/MM3 (ref 0.7–3.1)
LYMPHOCYTES NFR BLD AUTO: 32 % (ref 19.6–45.3)
MCH RBC QN AUTO: 31.3 PG (ref 26.6–33)
MCHC RBC AUTO-ENTMCNC: 32.8 G/DL (ref 31.5–35.7)
MCV RBC AUTO: 95.4 FL (ref 79–97)
MONOCYTES # BLD AUTO: 0.42 10*3/MM3 (ref 0.1–0.9)
MONOCYTES NFR BLD AUTO: 5.2 % (ref 5–12)
NEUTROPHILS NFR BLD AUTO: 4.72 10*3/MM3 (ref 1.7–7)
NEUTROPHILS NFR BLD AUTO: 58 % (ref 42.7–76)
PLATELET # BLD AUTO: 364 10*3/MM3 (ref 140–450)
PMV BLD AUTO: 8.7 FL (ref 6–12)
POTASSIUM SERPL-SCNC: 4.1 MMOL/L (ref 3.5–5.2)
PROT SERPL-MCNC: 6.8 G/DL (ref 6–8.5)
RBC # BLD AUTO: 4.79 10*6/MM3 (ref 3.77–5.28)
SODIUM SERPL-SCNC: 140 MMOL/L (ref 136–145)
WBC NRBC COR # BLD: 8.13 10*3/MM3 (ref 3.4–10.8)

## 2022-10-07 PROCEDURE — 80053 COMPREHEN METABOLIC PANEL: CPT

## 2022-10-07 PROCEDURE — 82378 CARCINOEMBRYONIC ANTIGEN: CPT

## 2022-10-07 PROCEDURE — 85025 COMPLETE CBC W/AUTO DIFF WBC: CPT

## 2022-10-07 PROCEDURE — 36415 COLL VENOUS BLD VENIPUNCTURE: CPT

## 2022-11-17 ENCOUNTER — HOSPITAL ENCOUNTER (OUTPATIENT)
Dept: MAMMOGRAPHY | Facility: HOSPITAL | Age: 57
Discharge: HOME | End: 2022-11-17
Admitting: NURSE PRACTITIONER

## 2022-11-17 DIAGNOSIS — Z12.31 SCREENING MAMMOGRAM FOR BREAST CANCER: ICD-10-CM

## 2022-11-17 PROCEDURE — 77067 SCR MAMMO BI INCL CAD: CPT

## 2022-11-17 PROCEDURE — 77063 BREAST TOMOSYNTHESIS BI: CPT

## 2022-11-23 DIAGNOSIS — E78.49 OTHER HYPERLIPIDEMIA: ICD-10-CM

## 2022-11-23 RX ORDER — PRAVASTATIN SODIUM 40 MG
TABLET ORAL
Qty: 90 TABLET | Refills: 1 | Status: SHIPPED | OUTPATIENT
Start: 2022-11-23 | End: 2023-04-06

## 2022-11-29 DIAGNOSIS — N64.89 BREAST ASYMMETRY: Primary | ICD-10-CM

## 2022-12-06 ENCOUNTER — HOSPITAL ENCOUNTER (OUTPATIENT)
Dept: ULTRASOUND IMAGING | Facility: HOSPITAL | Age: 57
Discharge: HOME | End: 2022-12-06

## 2022-12-06 ENCOUNTER — HOSPITAL ENCOUNTER (OUTPATIENT)
Dept: MAMMOGRAPHY | Facility: HOSPITAL | Age: 57
Discharge: HOME | End: 2022-12-06

## 2022-12-06 DIAGNOSIS — N64.89 BREAST ASYMMETRY: ICD-10-CM

## 2022-12-06 PROCEDURE — 77065 DX MAMMO INCL CAD UNI: CPT

## 2022-12-06 PROCEDURE — G0279 TOMOSYNTHESIS, MAMMO: HCPCS

## 2022-12-09 DIAGNOSIS — M72.2 PLANTAR FASCIITIS: ICD-10-CM

## 2022-12-09 NOTE — TELEPHONE ENCOUNTER
Rx Refill Note  Requested Prescriptions     Pending Prescriptions Disp Refills   • diclofenac (VOLTAREN) 75 MG EC tablet [Pharmacy Med Name: DICLOFENAC SOD EC 75 MG TAB] 180 tablet 1     Sig: TAKE 1 TABLET BY MOUTH 2 (TWO) TIMES A DAY WITH MEALS. NEXT APPT DUE IN DECEMBER      Last office visit with prescribing clinician: 10/3/2022   Last telemedicine visit with prescribing clinician: Visit date not found   Next office visit with prescribing clinician: Visit date not found     Maria C Alfonso LPN  12/09/22, 08:53 EST     -6/16/22 #180, RF-1    Comprehensive Metabolic Panel (10/07/2022 09:08)

## 2022-12-12 RX ORDER — DICLOFENAC SODIUM 75 MG/1
75 TABLET, DELAYED RELEASE ORAL 2 TIMES DAILY WITH MEALS
Qty: 180 TABLET | Refills: 1 | OUTPATIENT
Start: 2022-12-12

## 2022-12-26 DIAGNOSIS — I10 ESSENTIAL HYPERTENSION: ICD-10-CM

## 2022-12-26 DIAGNOSIS — E11.9 TYPE 2 DIABETES MELLITUS WITHOUT COMPLICATION, WITHOUT LONG-TERM CURRENT USE OF INSULIN: ICD-10-CM

## 2022-12-27 RX ORDER — METFORMIN HYDROCHLORIDE 500 MG/1
TABLET, EXTENDED RELEASE ORAL
Qty: 180 TABLET | Refills: 1 | Status: SHIPPED | OUTPATIENT
Start: 2022-12-27

## 2022-12-27 RX ORDER — METOPROLOL SUCCINATE 50 MG/1
TABLET, EXTENDED RELEASE ORAL
Qty: 90 TABLET | Refills: 1 | Status: SHIPPED | OUTPATIENT
Start: 2022-12-27 | End: 2023-04-06

## 2022-12-27 RX ORDER — RIVAROXABAN 20 MG/1
TABLET, FILM COATED ORAL
Qty: 90 TABLET | Refills: 1 | Status: SHIPPED | OUTPATIENT
Start: 2022-12-27

## 2022-12-27 RX ORDER — LOSARTAN POTASSIUM 50 MG/1
TABLET ORAL
Qty: 90 TABLET | Refills: 1 | Status: SHIPPED | OUTPATIENT
Start: 2022-12-27

## 2022-12-27 RX ORDER — HYDROCHLOROTHIAZIDE 25 MG/1
TABLET ORAL
Qty: 90 TABLET | Refills: 1 | Status: SHIPPED | OUTPATIENT
Start: 2022-12-27

## 2023-01-04 ENCOUNTER — LAB (OUTPATIENT)
Dept: LAB | Facility: HOSPITAL | Age: 58
End: 2023-01-04
Payer: MEDICAID

## 2023-01-04 ENCOUNTER — OFFICE VISIT (OUTPATIENT)
Dept: FAMILY MEDICINE CLINIC | Age: 58
End: 2023-01-04
Payer: COMMERCIAL

## 2023-01-04 VITALS
HEIGHT: 65 IN | HEART RATE: 84 BPM | SYSTOLIC BLOOD PRESSURE: 136 MMHG | WEIGHT: 206.6 LBS | BODY MASS INDEX: 34.42 KG/M2 | DIASTOLIC BLOOD PRESSURE: 83 MMHG

## 2023-01-04 DIAGNOSIS — E11.9 TYPE 2 DIABETES MELLITUS WITHOUT COMPLICATION, WITHOUT LONG-TERM CURRENT USE OF INSULIN: ICD-10-CM

## 2023-01-04 DIAGNOSIS — E78.49 OTHER HYPERLIPIDEMIA: ICD-10-CM

## 2023-01-04 DIAGNOSIS — R79.89 ELEVATED LFTS: ICD-10-CM

## 2023-01-04 DIAGNOSIS — Z23 IMMUNIZATION DUE: Primary | ICD-10-CM

## 2023-01-04 DIAGNOSIS — I10 ESSENTIAL HYPERTENSION: ICD-10-CM

## 2023-01-04 LAB
ALBUMIN SERPL-MCNC: 4.5 G/DL (ref 3.5–5.2)
ALBUMIN/GLOB SERPL: 2.1 G/DL
ALP SERPL-CCNC: 114 U/L (ref 39–117)
ALT SERPL W P-5'-P-CCNC: 90 U/L (ref 1–33)
ANION GAP SERPL CALCULATED.3IONS-SCNC: 11.7 MMOL/L (ref 5–15)
AST SERPL-CCNC: 42 U/L (ref 1–32)
BILIRUB SERPL-MCNC: 0.2 MG/DL (ref 0–1.2)
BUN SERPL-MCNC: 18 MG/DL (ref 6–20)
BUN/CREAT SERPL: 23.7 (ref 7–25)
CALCIUM SPEC-SCNC: 9.7 MG/DL (ref 8.6–10.5)
CHLORIDE SERPL-SCNC: 101 MMOL/L (ref 98–107)
CO2 SERPL-SCNC: 27.3 MMOL/L (ref 22–29)
CREAT SERPL-MCNC: 0.76 MG/DL (ref 0.57–1)
EGFRCR SERPLBLD CKD-EPI 2021: 91.5 ML/MIN/1.73
GLOBULIN UR ELPH-MCNC: 2.1 GM/DL
GLUCOSE SERPL-MCNC: 182 MG/DL (ref 65–99)
HBA1C MFR BLD: 7.4 % (ref 4.8–5.6)
POTASSIUM SERPL-SCNC: 3.7 MMOL/L (ref 3.5–5.2)
PROT SERPL-MCNC: 6.6 G/DL (ref 6–8.5)
SODIUM SERPL-SCNC: 140 MMOL/L (ref 136–145)

## 2023-01-04 PROCEDURE — 80053 COMPREHEN METABOLIC PANEL: CPT

## 2023-01-04 PROCEDURE — 36415 COLL VENOUS BLD VENIPUNCTURE: CPT

## 2023-01-04 PROCEDURE — 99214 OFFICE O/P EST MOD 30 MIN: CPT | Performed by: NURSE PRACTITIONER

## 2023-01-04 PROCEDURE — 90471 IMMUNIZATION ADMIN: CPT | Performed by: NURSE PRACTITIONER

## 2023-01-04 PROCEDURE — 1159F MED LIST DOCD IN RCRD: CPT | Performed by: NURSE PRACTITIONER

## 2023-01-04 PROCEDURE — 83036 HEMOGLOBIN GLYCOSYLATED A1C: CPT

## 2023-01-04 PROCEDURE — 90686 IIV4 VACC NO PRSV 0.5 ML IM: CPT | Performed by: NURSE PRACTITIONER

## 2023-01-04 NOTE — ASSESSMENT & PLAN NOTE
Hypertension is stable. to monitor BP at home. Continue current meds. Continue to modify diet and lifestyle.

## 2023-01-04 NOTE — PROGRESS NOTES
Marilou Edwards presents to [facility].    Chief Complaint:  Diabetes and Hypertension         History of Present Illness:  Diabetes:  Current medication: metformin  Tolerating medication: Yes  Last eye exam: over a year, usually goes vision works   Last foot exam:   At home BS ranges: 110-120  She felt bad a few days ago and checked her sugar after midnight, it was 120, wondered if she could be having hypoglycemia   Lab Results       Component                Value               Date                       HGBA1C                   7.00 (H)            10/03/2022              Hypertension:  Current medication: losartan, HCTz and Toprol XL   Tolerating Medication: Yes  Checking BP at home and it is: not checking   Needs refills: No  Labs:  Lab Results       Component                Value               Date                       GLUCOSE                  159 (H)             10/07/2022                 BUN                      12                  10/07/2022                 CREATININE               0.63                10/07/2022                 EGFRIFNONA               98                  12/20/2021                 BCR                      19.0                10/07/2022                 K                        4.1                 10/07/2022                 CO2                      28.0                10/07/2022                 CALCIUM                  9.9                 10/07/2022                 ALBUMIN                  4.50                10/07/2022                 LABIL2                   1.7                 07/18/2020                 AST                      65 (H)              10/07/2022                 ALT                      94 (H)              10/07/2022           Hyperlipidemia  Current medication: pravastatin   Tolerating medication: Yes  Needs Refill: no     Lab Results       Component                Value               Date                       CHOL                      209 (H)             10/03/2022                 CHLPL                    228 (H)             2020                 TRIG                     120                 10/03/2022                 HDL                      69 (H)              10/03/2022                 LDL                      119 (H)             10/03/2022           LFT's were up and were up again when she saw her oncologist.         PAST MEDICAL HISTORY changes since :         Pulmonary Embolism: x 2;     Gestational Diabetes     Prothrombin gene mutation (MTHFR) one gene heterozygous/Liden 2     Colon cancer: dx'd in 2004;         GYNECOLOGICAL HISTORY:     miscarriage 1         Surgical History:         Hernia Repair: 2009; Ventral hernia repair;     Appendectomy: ;     Colon Resection: ; oophrectmy/salp;     colostomy reversl fall ; Procedures: colonoscopy 13 normal     Dilation and Curettage: 17; hysterscopy; Other Surgeries    LiF DIP  index mucous cyst excision  19;     COLONOSCOPY: was last done 8-3-22/ normal per pt ;  17 [facility]         Family History:     Father:  at age 62; Cause of death was MI;  Alcoholism     Mother: Hypertension     Brother(s): 3 brother(s) total; 1 ;  Hypertension;  Drug Abuse ( heroin overdose )     Sister(s): Healthy; 1 sister(s) total;  Colonic Polyps;  Anxiety; Drug Abuse     Paternal Grandfather: Cause of death was MI     Paternal Grandmother: Cause of death was heart/obesity     Maternal Grandfather:  at age 60; Cause of death was MI     Maternal Grandmother:  at age 72; Cause of death was MI;  Type 2 Diabetes         Social History:         Household:   .  Occupation: [employer]     Marital Status:      Children: 3 children                 Review of Systems:  Review of Systems   Constitutional: Negative for fatigue and fever.   Respiratory: Negative for cough and shortness of breath.     Cardiovascular: Negative for chest pain, palpitations and leg swelling.   Neurological: Negative for numbness.   Psychiatric/Behavioral: Positive for stress (due to her 's health issues and issues with insurance ).          Current Outpatient Medications:   •  cholecalciferol (VITAMIN D3) 25 MCG (1000 UT) tablet, Take 2,000 Units by mouth Daily., Disp: , Rfl:   •  diclofenac (VOLTAREN) 75 MG EC tablet, Take 1 tablet by mouth 2 (Two) Times a Day With Meals. Next appt due in December, Disp: 180 tablet, Rfl: 1  •  fluticasone (FLONASE) 50 MCG/ACT nasal spray, Daily., Disp: , Rfl:   •  hydroCHLOROthiazide (HYDRODIURIL) 25 MG tablet, TAKE 1 TABLET BY MOUTH EVERY DAY, Disp: 90 tablet, Rfl: 1  •  loratadine (CLARITIN) 10 MG tablet, Take 10 mg by mouth Daily., Disp: , Rfl:   •  losartan (COZAAR) 50 MG tablet, TAKE 1 TABLET BY MOUTH EVERY DAY, Disp: 90 tablet, Rfl: 1  •  metFORMIN ER (GLUCOPHAGE-XR) 500 MG 24 hr tablet, TAKE 1 TABLET BY MOUTH TWICE A DAY WITH MEALS, Disp: 180 tablet, Rfl: 1  •  metoprolol succinate XL (TOPROL-XL) 50 MG 24 hr tablet, TAKE 1 TABLET BY MOUTH EVERY DAY, Disp: 90 tablet, Rfl: 1  •  pravastatin (PRAVACHOL) 40 MG tablet, TAKE 1 TABLET BY MOUTH EVERY DAY, Disp: 90 tablet, Rfl: 1  •  valACYclovir (Valtrex) 1000 MG tablet, Take 1 tablet by mouth 2 (Two) Times a Day As Needed for cold sores., Disp: 60 tablet, Rfl: 1  •  Xarelto 20 MG tablet, TAKE 1 TABLET BY MOUTH EVERY DAY, Disp: 90 tablet, Rfl: 1    Vital Signs:   Vitals:    01/04/23 1356   BP: 136/83   BP Location: Right arm   Patient Position: Sitting   Pulse: 84   Weight: 93.7 kg (206 lb 9.6 oz)   Height: 165.1 cm (65\")         Physical Exam:  Physical Exam  Vitals reviewed.   Constitutional:       General: She is not in acute distress.     Appearance: Normal appearance.   Neck:      Vascular: No carotid bruit.   Cardiovascular:      Rate and Rhythm: Normal rate and regular rhythm.      Heart sounds: Normal heart sounds. No murmur  heard.  Pulmonary:      Effort: Pulmonary effort is normal. No respiratory distress.      Breath sounds: Normal breath sounds.   Musculoskeletal:      Right lower leg: No edema.      Left lower leg: No edema.   Neurological:      Mental Status: She is alert.   Psychiatric:         Mood and Affect: Mood normal.         Behavior: Behavior normal.         Result Review      The following data was reviewed by: ELLI Savage on 01/04/2023:    Results for orders placed or performed in visit on 10/07/22   CEA    Specimen: Blood   Result Value Ref Range    CEA 1.88 ng/mL   Comprehensive Metabolic Panel    Specimen: Blood   Result Value Ref Range    Glucose 159 (H) 65 - 99 mg/dL    BUN 12 6 - 20 mg/dL    Creatinine 0.63 0.57 - 1.00 mg/dL    Sodium 140 136 - 145 mmol/L    Potassium 4.1 3.5 - 5.2 mmol/L    Chloride 102 98 - 107 mmol/L    CO2 28.0 22.0 - 29.0 mmol/L    Calcium 9.9 8.6 - 10.5 mg/dL    Total Protein 6.8 6.0 - 8.5 g/dL    Albumin 4.50 3.50 - 5.20 g/dL    ALT (SGPT) 94 (H) 1 - 33 U/L    AST (SGOT) 65 (H) 1 - 32 U/L    Alkaline Phosphatase 112 39 - 117 U/L    Total Bilirubin 0.6 0.0 - 1.2 mg/dL    Globulin 2.3 gm/dL    A/G Ratio 2.0 g/dL    BUN/Creatinine Ratio 19.0 7.0 - 25.0    Anion Gap 10.0 5.0 - 15.0 mmol/L    eGFR 103.6 >60.0 mL/min/1.73   CBC Auto Differential    Specimen: Blood   Result Value Ref Range    WBC 8.13 3.40 - 10.80 10*3/mm3    RBC 4.79 3.77 - 5.28 10*6/mm3    Hemoglobin 15.0 12.0 - 15.9 g/dL    Hematocrit 45.7 34.0 - 46.6 %    MCV 95.4 79.0 - 97.0 fL    MCH 31.3 26.6 - 33.0 pg    MCHC 32.8 31.5 - 35.7 g/dL    RDW 12.0 (L) 12.3 - 15.4 %    RDW-SD 42.8 37.0 - 54.0 fl    MPV 8.7 6.0 - 12.0 fL    Platelets 364 140 - 450 10*3/mm3    Neutrophil % 58.0 42.7 - 76.0 %    Lymphocyte % 32.0 19.6 - 45.3 %    Monocyte % 5.2 5.0 - 12.0 %    Eosinophil % 4.3 0.3 - 6.2 %    Basophil % 0.4 0.0 - 1.5 %    Immature Grans % 0.1 0.0 - 0.5 %    Neutrophils, Absolute 4.72 1.70 - 7.00 10*3/mm3     Lymphocytes, Absolute 2.60 0.70 - 3.10 10*3/mm3    Monocytes, Absolute 0.42 0.10 - 0.90 10*3/mm3    Eosinophils, Absolute 0.35 0.00 - 0.40 10*3/mm3    Basophils, Absolute 0.03 0.00 - 0.20 10*3/mm3    Immature Grans, Absolute 0.01 0.00 - 0.05 10*3/mm3               Assessment and Plan:          Diagnoses and all orders for this visit:    1. Immunization due (Primary)  Assessment & Plan:  Updated with flu vaccine today     Orders:  -     FluLaval/Fluzone >6 mos (3031-6695)    2. Type 2 diabetes mellitus without complication, without long-term current use of insulin (HCC)  Assessment & Plan:  Advised to get her eye exam set up, continue to work on diet and monitor her sugars, send for an A1C, reviewed her last labs     Orders:  -     Comprehensive metabolic panel; Future  -     Hemoglobin A1c; Future    3. Essential hypertension  Assessment & Plan:  Hypertension is stable. to monitor BP at home. Continue current meds. Continue to modify diet and lifestyle.      4. Other hyperlipidemia  Assessment & Plan:  Reviewed last lipid panel       5. Elevated LFTs  Assessment & Plan:  Reviewed the last few labs, rechecking labs today     Orders:  -     Comprehensive metabolic panel; Future        Follow Up   Return for followup pending lab results.  Patient was given instructions and counseling regarding her condition or for health maintenance advice. Please see specific information pulled into the AVS if appropriate.

## 2023-01-04 NOTE — ASSESSMENT & PLAN NOTE
Advised to get her eye exam set up, continue to work on diet and monitor her sugars, send for an A1C, reviewed her last labs

## 2023-01-12 DIAGNOSIS — E11.9 TYPE 2 DIABETES MELLITUS WITHOUT COMPLICATION, WITHOUT LONG-TERM CURRENT USE OF INSULIN: Primary | ICD-10-CM

## 2023-01-12 DIAGNOSIS — E78.49 OTHER HYPERLIPIDEMIA: ICD-10-CM

## 2023-02-14 DIAGNOSIS — M72.2 PLANTAR FASCIITIS: ICD-10-CM

## 2023-02-16 RX ORDER — VALACYCLOVIR HYDROCHLORIDE 1 G/1
1000 TABLET, FILM COATED ORAL 2 TIMES DAILY PRN
Qty: 60 TABLET | Refills: 1 | Status: SHIPPED | OUTPATIENT
Start: 2023-02-16

## 2023-02-16 RX ORDER — DICLOFENAC SODIUM 75 MG/1
75 TABLET, DELAYED RELEASE ORAL 2 TIMES DAILY WITH MEALS
Qty: 180 TABLET | Refills: 0 | Status: SHIPPED | OUTPATIENT
Start: 2023-02-16

## 2023-02-16 NOTE — TELEPHONE ENCOUNTER
Rx Refill Note  Requested Prescriptions     Pending Prescriptions Disp Refills   • diclofenac (VOLTAREN) 75 MG EC tablet [Pharmacy Med Name: DICLOFENAC SOD EC 75 MG TAB] 180 tablet 1     Sig: TAKE 1 TABLET BY MOUTH 2 (TWO) TIMES A DAY WITH MEALS. NEXT APPT DUE IN DECEMBER   • valACYclovir (VALTREX) 1000 MG tablet [Pharmacy Med Name: VALACYCLOVIR HCL 1 GRAM TABLET] 60 tablet 1     Sig: TAKE 1 TABLET BY MOUTH 2 (TWO) TIMES A DAY AS NEEDED FOR COLD SORES.      Last office visit with prescribing clinician: 1/4/2023       Next office visit with prescribing clinician: 7/10/2023     Valtrex last filled 9/17/21 #60 1 refill     Diclofenac last filled 3/16/22  #280 1 refill     Last CMP 1/4/23  Comprehensive metabolic panel (01/04/2023 14:45)    Meri Persaud LPN  02/16/23, 08:48 EST

## 2023-03-10 ENCOUNTER — LAB (OUTPATIENT)
Dept: LAB | Facility: HOSPITAL | Age: 58
End: 2023-03-10
Payer: MEDICAID

## 2023-03-10 DIAGNOSIS — E11.9 TYPE 2 DIABETES MELLITUS WITHOUT COMPLICATION, WITHOUT LONG-TERM CURRENT USE OF INSULIN: ICD-10-CM

## 2023-03-10 DIAGNOSIS — E78.49 OTHER HYPERLIPIDEMIA: ICD-10-CM

## 2023-03-10 LAB
ALBUMIN SERPL-MCNC: 4.1 G/DL (ref 3.5–5.2)
ALBUMIN/GLOB SERPL: 1.4 G/DL
ALP SERPL-CCNC: 103 U/L (ref 39–117)
ALT SERPL W P-5'-P-CCNC: 55 U/L (ref 1–33)
ANION GAP SERPL CALCULATED.3IONS-SCNC: 10 MMOL/L (ref 5–15)
AST SERPL-CCNC: 37 U/L (ref 1–32)
BILIRUB SERPL-MCNC: 0.3 MG/DL (ref 0–1.2)
BUN SERPL-MCNC: 13 MG/DL (ref 6–20)
BUN/CREAT SERPL: 15.9 (ref 7–25)
CALCIUM SPEC-SCNC: 9.3 MG/DL (ref 8.6–10.5)
CHLORIDE SERPL-SCNC: 101 MMOL/L (ref 98–107)
CHOLEST SERPL-MCNC: 184 MG/DL (ref 0–200)
CO2 SERPL-SCNC: 27 MMOL/L (ref 22–29)
CREAT SERPL-MCNC: 0.82 MG/DL (ref 0.57–1)
EGFRCR SERPLBLD CKD-EPI 2021: 83.5 ML/MIN/1.73
GLOBULIN UR ELPH-MCNC: 2.9 GM/DL
GLUCOSE SERPL-MCNC: 143 MG/DL (ref 65–99)
HBA1C MFR BLD: 7.5 % (ref 4.8–5.6)
HDLC SERPL-MCNC: 64 MG/DL (ref 40–60)
LDLC SERPL CALC-MCNC: 104 MG/DL (ref 0–100)
LDLC/HDLC SERPL: 1.61 {RATIO}
POTASSIUM SERPL-SCNC: 3.9 MMOL/L (ref 3.5–5.2)
PROT SERPL-MCNC: 7 G/DL (ref 6–8.5)
SODIUM SERPL-SCNC: 138 MMOL/L (ref 136–145)
TRIGL SERPL-MCNC: 85 MG/DL (ref 0–150)
VLDLC SERPL-MCNC: 16 MG/DL (ref 5–40)

## 2023-03-10 PROCEDURE — 80061 LIPID PANEL: CPT

## 2023-03-10 PROCEDURE — 36415 COLL VENOUS BLD VENIPUNCTURE: CPT

## 2023-03-10 PROCEDURE — 80053 COMPREHEN METABOLIC PANEL: CPT

## 2023-03-10 PROCEDURE — 83036 HEMOGLOBIN GLYCOSYLATED A1C: CPT

## 2023-03-13 DIAGNOSIS — E11.9 TYPE 2 DIABETES MELLITUS WITHOUT COMPLICATION, WITHOUT LONG-TERM CURRENT USE OF INSULIN: Primary | ICD-10-CM

## 2023-03-13 RX ORDER — VALACYCLOVIR HYDROCHLORIDE 1 G/1
1000 TABLET, FILM COATED ORAL 2 TIMES DAILY PRN
Qty: 60 TABLET | Refills: 1 | OUTPATIENT
Start: 2023-03-13

## 2023-04-05 ENCOUNTER — HOSPITAL ENCOUNTER (OUTPATIENT)
Dept: GENERAL RADIOLOGY | Facility: HOSPITAL | Age: 58
Discharge: HOME | End: 2023-04-05
Admitting: NURSE PRACTITIONER
Payer: MEDICAID

## 2023-04-05 ENCOUNTER — OFFICE VISIT (OUTPATIENT)
Dept: FAMILY MEDICINE CLINIC | Age: 58
End: 2023-04-05
Payer: MEDICAID

## 2023-04-05 VITALS
HEIGHT: 65 IN | SYSTOLIC BLOOD PRESSURE: 126 MMHG | OXYGEN SATURATION: 98 % | TEMPERATURE: 98.2 F | RESPIRATION RATE: 17 BRPM | HEART RATE: 87 BPM | BODY MASS INDEX: 33.69 KG/M2 | WEIGHT: 202.2 LBS | DIASTOLIC BLOOD PRESSURE: 73 MMHG

## 2023-04-05 DIAGNOSIS — R05.1 ACUTE COUGH: Primary | ICD-10-CM

## 2023-04-05 DIAGNOSIS — M79.644 PAIN OF FINGER OF RIGHT HAND: ICD-10-CM

## 2023-04-05 DIAGNOSIS — J01.00 ACUTE MAXILLARY SINUSITIS, RECURRENCE NOT SPECIFIED: ICD-10-CM

## 2023-04-05 PROCEDURE — 3051F HG A1C>EQUAL 7.0%<8.0%: CPT | Performed by: NURSE PRACTITIONER

## 2023-04-05 PROCEDURE — 3078F DIAST BP <80 MM HG: CPT | Performed by: NURSE PRACTITIONER

## 2023-04-05 PROCEDURE — 73130 X-RAY EXAM OF HAND: CPT

## 2023-04-05 PROCEDURE — 99213 OFFICE O/P EST LOW 20 MIN: CPT | Performed by: NURSE PRACTITIONER

## 2023-04-05 PROCEDURE — 3074F SYST BP LT 130 MM HG: CPT | Performed by: NURSE PRACTITIONER

## 2023-04-05 RX ORDER — AMOXICILLIN AND CLAVULANATE POTASSIUM 875; 125 MG/1; MG/1
1 TABLET, FILM COATED ORAL 2 TIMES DAILY
Qty: 20 TABLET | Refills: 0 | Status: SHIPPED | OUTPATIENT
Start: 2023-04-05

## 2023-04-05 NOTE — PROGRESS NOTES
Marilou Edwards presents to [facility].    Chief Complaint:  Cough (Cough, congestion, and chest discomfort from coughing. 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   Symptoms:productive purulent sputum/ cough, was having ST, head and nasal drainage, yellow   Treatment tried: OTC allergy rx    PAST MEDICAL HISTORY changes since :         Pulmonary Embolism: x 2;     Gestational Diabetes     Prothrombin gene mutation (MTHFR) one gene heterozygous/Liden 2     Colon cancer: dx'd in 2004;         GYNECOLOGICAL HISTORY:     miscarriage 1         Surgical History:         Hernia Repair: 2009; Ventral hernia repair;     Appendectomy: ;     Colon Resection: ; oophrectmy/salp;     colostomy reversl fall ; Procedures: colonoscopy 13 normal     Dilation and Curettage: 17; hysterscopy; Other Surgeries    LiF DIP  index mucous cyst excision  19;     COLONOSCOPY: was last done 8-3-22/ normal per pt ;  17 with normal results [place]         Family History:     Father:  at age 62; Cause of death was MI;  Alcoholism     Mother: Hypertension     Brother(s): 3 brother(s) total; 1 ;  Hypertension;  Drug Abuse ( heroin overdose )     Sister(s): Healthy; 1 sister(s) total;  Colonic Polyps;  Anxiety; Drug Abuse     Paternal Grandfather: Cause of death was MI     Paternal Grandmother: Cause of death was heart/obesity     Maternal Grandfather:  at age 60; Cause of death was MI     Maternal Grandmother:  at age 72; Cause of death was MI;  Type 2 Diabetes         Social History:         Household:   .  Occupation: [employer]     Marital Status:      Children: 3  childre      Review of Systems:  Review of Systems   Constitutional: Negative for fever.   HENT: Positive for sore throat (occ ).    Respiratory: Negative for shortness of breath and wheezing.    Cardiovascular: Negative for chest pain.   Musculoskeletal: Positive for arthralgias (right hand pain, difficulty flexing her finger getting worse over last several months ).          Current Outpatient Medications:   •  cholecalciferol (VITAMIN D3) 25 MCG (1000 UT) tablet, Take 2 tablets by mouth Daily., Disp: , Rfl:   •  diclofenac (VOLTAREN) 75 MG EC tablet, TAKE 1 TABLET BY MOUTH 2 (TWO) TIMES A DAY WITH MEALS. NEXT APPT DUE IN DECEMBER, Disp: 180 tablet, Rfl: 0  •  fluticasone (FLONASE) 50 MCG/ACT nasal spray, Daily., Disp: , Rfl:   •  hydroCHLOROthiazide (HYDRODIURIL) 25 MG tablet, TAKE 1 TABLET BY MOUTH EVERY DAY, Disp: 90 tablet, Rfl: 1  •  loratadine (CLARITIN) 10 MG tablet, Take 1 tablet by mouth Daily., Disp: , Rfl:   •  losartan (COZAAR) 50 MG tablet, TAKE 1 TABLET BY MOUTH EVERY DAY, Disp: 90 tablet, Rfl: 1  •  metFORMIN ER (GLUCOPHAGE-XR) 500 MG 24 hr tablet, TAKE 1 TABLET BY MOUTH TWICE A DAY WITH MEALS, Disp: 180 tablet, Rfl: 1  •  metoprolol succinate XL (TOPROL-XL) 50 MG 24 hr tablet, TAKE 1 TABLET BY MOUTH EVERY DAY, Disp: 90 tablet, Rfl: 1  •  pravastatin (PRAVACHOL) 40 MG tablet, TAKE 1 TABLET BY MOUTH EVERY DAY, Disp: 90 tablet, Rfl: 1  •  valACYclovir (VALTREX) 1000 MG tablet, TAKE 1 TABLET BY MOUTH 2 (TWO) TIMES A DAY AS NEEDED FOR COLD SORES., Disp: 60 tablet, Rfl: 1  •  Xarelto 20 MG tablet, TAKE 1 TABLET BY MOUTH EVERY DAY, Disp: 90 tablet, Rfl: 1  •  amoxicillin-clavulanate (Augmentin) 875-125 MG per tablet, Take 1 tablet by mouth 2 (Two) Times a Day., Disp: 20 tablet, Rfl: 0    Vital Signs:   Vitals:    04/05/23 1454   BP: 126/73   BP Location: Left arm   Patient Position: Sitting   Cuff Size: Large Adult   Pulse: 87   Resp: 17   Temp: 98.2 °F (36.8 °C)   TempSrc: Oral   SpO2: 98%  Comment: room  "air   Weight: 91.7 kg (202 lb 3.2 oz)   Height: 165.1 cm (65\")   PainSc: 0-No pain         Physical Exam:  Physical Exam  Constitutional:       General: She is not in acute distress.     Appearance: Normal appearance.   HENT:      Right Ear: Tympanic membrane, ear canal and external ear normal.      Left Ear: Tympanic membrane, ear canal and external ear normal.      Nose: Congestion (max sinus tenderness ) present.      Mouth/Throat:      Pharynx: Oropharynx is clear. No posterior oropharyngeal erythema.   Cardiovascular:      Rate and Rhythm: Normal rate and regular rhythm.      Heart sounds: No murmur heard.  Pulmonary:      Effort: Pulmonary effort is normal.      Breath sounds: Normal breath sounds.   Musculoskeletal:         General: Tenderness (to palpation of proximal right index finger, pain with ROM of finger ) present.   Lymphadenopathy:      Cervical: No cervical adenopathy.   Neurological:      Mental Status: She is alert.   Psychiatric:         Mood and Affect: Mood normal.         Behavior: Behavior normal.         Result Review      The following data was reviewed by: ELLI Savage on 04/05/2023:    Results for orders placed or performed in visit on 03/10/23   Comprehensive Metabolic Panel    Specimen: Blood   Result Value Ref Range    Glucose 143 (H) 65 - 99 mg/dL    BUN 13 6 - 20 mg/dL    Creatinine 0.82 0.57 - 1.00 mg/dL    Sodium 138 136 - 145 mmol/L    Potassium 3.9 3.5 - 5.2 mmol/L    Chloride 101 98 - 107 mmol/L    CO2 27.0 22.0 - 29.0 mmol/L    Calcium 9.3 8.6 - 10.5 mg/dL    Total Protein 7.0 6.0 - 8.5 g/dL    Albumin 4.1 3.5 - 5.2 g/dL    ALT (SGPT) 55 (H) 1 - 33 U/L    AST (SGOT) 37 (H) 1 - 32 U/L    Alkaline Phosphatase 103 39 - 117 U/L    Total Bilirubin 0.3 0.0 - 1.2 mg/dL    Globulin 2.9 gm/dL    A/G Ratio 1.4 g/dL    BUN/Creatinine Ratio 15.9 7.0 - 25.0    Anion Gap 10.0 5.0 - 15.0 mmol/L    eGFR 83.5 >60.0 mL/min/1.73   Lipid Panel    Specimen: Blood   Result Value Ref " Range    Total Cholesterol 184 0 - 200 mg/dL    Triglycerides 85 0 - 150 mg/dL    HDL Cholesterol 64 (H) 40 - 60 mg/dL    LDL Cholesterol  104 (H) 0 - 100 mg/dL    VLDL Cholesterol 16 5 - 40 mg/dL    LDL/HDL Ratio 1.61    Hemoglobin A1c    Specimen: Blood   Result Value Ref Range    Hemoglobin A1C 7.50 (H) 4.80 - 5.60 %               Assessment and Plan:          Diagnoses and all orders for this visit:    1. Acute cough (Primary)  Assessment & Plan:  Try mucinex DM at HS       2. Acute maxillary sinusitis, recurrence not specified  Assessment & Plan:  Covering her with ATB, continue allergy rx's; Rest, increase fluids, follow up if symptoms progress or change       Orders:  -     amoxicillin-clavulanate (Augmentin) 875-125 MG per tablet; Take 1 tablet by mouth 2 (Two) Times a Day.  Dispense: 20 tablet; Refill: 0    3. Pain of finger of right hand  Assessment & Plan:  Send for x-ray, she has seen [facility] in the past     Orders:  -     XR Hand 3+ View Right; Future        Follow Up   Return if symptoms worsen or fail to improve, for follow up pending x-ray result.  Patient was given instructions and counseling regarding her condition or for health maintenance advice. Please see specific information pulled into the AVS if appropriate.

## 2023-04-05 NOTE — ASSESSMENT & PLAN NOTE
Covering her with ATB, continue allergy rx's; Rest, increase fluids, follow up if symptoms progress or change

## 2023-04-06 DIAGNOSIS — E78.49 OTHER HYPERLIPIDEMIA: ICD-10-CM

## 2023-04-06 DIAGNOSIS — I10 ESSENTIAL HYPERTENSION: ICD-10-CM

## 2023-04-06 RX ORDER — METOPROLOL SUCCINATE 50 MG/1
TABLET, EXTENDED RELEASE ORAL
Qty: 90 TABLET | Refills: 1 | Status: SHIPPED | OUTPATIENT
Start: 2023-04-06

## 2023-04-06 RX ORDER — PRAVASTATIN SODIUM 40 MG
TABLET ORAL
Qty: 90 TABLET | Refills: 1 | Status: SHIPPED | OUTPATIENT
Start: 2023-04-06

## 2023-04-07 DIAGNOSIS — M79.644 PAIN OF FINGER OF RIGHT HAND: ICD-10-CM

## 2023-04-07 DIAGNOSIS — M79.672 HEEL PAIN, CHRONIC, LEFT: Primary | ICD-10-CM

## 2023-04-07 DIAGNOSIS — G89.29 HEEL PAIN, CHRONIC, LEFT: Primary | ICD-10-CM

## 2023-05-31 RX ORDER — VALACYCLOVIR HYDROCHLORIDE 1 G/1
1000 TABLET, FILM COATED ORAL 2 TIMES DAILY PRN
Qty: 60 TABLET | Refills: 1 | Status: SHIPPED | OUTPATIENT
Start: 2023-05-31

## 2023-06-13 DIAGNOSIS — M72.2 PLANTAR FASCIITIS: ICD-10-CM

## 2023-06-13 RX ORDER — DICLOFENAC SODIUM 75 MG/1
75 TABLET, DELAYED RELEASE ORAL 2 TIMES DAILY WITH MEALS
Qty: 60 TABLET | Refills: 2 | Status: SHIPPED | OUTPATIENT
Start: 2023-06-13

## 2023-07-13 PROBLEM — Z86.711 HISTORY OF PULMONARY EMBOLUS (PE): Status: ACTIVE | Noted: 2023-07-13

## 2023-08-29 ENCOUNTER — OFFICE VISIT (OUTPATIENT)
Dept: FAMILY MEDICINE CLINIC | Age: 58
End: 2023-08-29
Payer: MEDICAID

## 2023-08-29 VITALS
TEMPERATURE: 98.4 F | SYSTOLIC BLOOD PRESSURE: 139 MMHG | HEART RATE: 93 BPM | DIASTOLIC BLOOD PRESSURE: 75 MMHG | HEIGHT: 65 IN | WEIGHT: 203.2 LBS | BODY MASS INDEX: 33.85 KG/M2

## 2023-08-29 DIAGNOSIS — C18.9 MALIGNANT NEOPLASM OF COLON, UNSPECIFIED PART OF COLON: ICD-10-CM

## 2023-08-29 DIAGNOSIS — E11.9 TYPE 2 DIABETES MELLITUS WITHOUT COMPLICATION, WITHOUT LONG-TERM CURRENT USE OF INSULIN: Primary | ICD-10-CM

## 2023-08-29 NOTE — PROGRESS NOTES
Chief Complaint  Labs Only (Discuss labs she may need.)    Subjective          Marilou Edwards presents to [facility]    History of Present Illness  Diabetes:  Current medication: metformin   Tolerating medication: Yes  Wants to know when her next lab is due   At home BS ranges: < 130  Lab Results       Component                Value               Date                       HGBA1C                   7.50 (H)            2023                Wants her labs done that her oncologist usually order, done with next labs, reports oncologist released her     PAST MEDICAL HISTORY changes since 2023:         Pulmonary Embolism: x 2;     Gestational Diabetes     Prothrombin gene mutation (MTHFR) one gene heterozygous/Liden 2     Colon cancer: dx'd in 2004;         GYNECOLOGICAL HISTORY:     miscarriage 1         Surgical History:         Hernia Repair: 2009; Ventral hernia repair;     Appendectomy: ;     Colon Resection: ; oophrectmy/salp;     colostomy reversl 2006; Procedures: colonoscopy 13 normal     Dilation and Curettage: 17; hysterscopy; Other Surgeries    LiF DIP  index mucous cyst excision  19;     COLONOSCOPY: was last done 8-3-22/ normal per pt ;  17 with normal results [place]         Family History:     Father:  at age 62; Cause of death was MI;  Alcoholism     Mother: Hypertension T2DM     Brother(s): 3 brother(s) total; 1 ;  Hypertension;  Drug Abuse ( heroin overdose )     Sister(s): Healthy; 1 sister(s) total;  Colonic Polyps;  Anxiety; Drug Abuse     Paternal Grandfather: Cause of death was MI     Paternal Grandmother: Cause of death was heart/obesity     Maternal Grandfather:  at age 60; Cause of death was MI     Maternal Grandmother:  at age 72; Cause of death was MI;  Type 2 Diabetes         Social History:          .  Occupation: Inner[employer]     Marital Status:       Children: 3 childre           Past Medical History:   Diagnosis Date    Acquired coagulation factor deficiency     Acute maxillary sinusitis, unspecified     Anxiety disorder     Colon cancer     dx'd in     Essential (primary) hypertension     Gestational diabetes     Hyperlipidemia, unspecified     Long term (current) use of anticoagulants     Other fatigue     Other hereditary and idiopathic neuropathies     Other specified disorders of nose and nasal sinuses     Pain in left finger(s)     Personal history of other venous thrombosis and embolism     Prothrombin gene mutation     one gene heterozygous/liden2    Pulmonary embolism     x2    Type 2 diabetes mellitus without complication     Unspecified superficial injury of right lower leg, initial encounter        Allergies   Allergen Reactions    Adhesive Tape Rash     adhesive    Copper Chloride Rash    Metal Rash        Past Surgical History:   Procedure Laterality Date    APPENDECTOMY  2005    COLON RESECTION   and 2006    oophrectmy/salp    COLONOSCOPY  2013    normal    COLONOSCOPY  2017    normal reults    COLONOSCOPY N/A 2022    repeat in 5 years    COLOSTOMY  2006    reversl    DILATION AND CURETTAGE, DIAGNOSTIC / THERAPEUTIC  2017    hysterscopy    HERNIA REPAIR  2009    ventral hernia repair    LUMBAR DIRECT LATERAL INTERBODY FUSION  2019    index mucous cyst excision        Social History     Tobacco Use    Smoking status: Former     Years: 17.00     Types: Cigarettes     Quit date:      Years since quittin.6     Passive exposure: Past    Smokeless tobacco: Never   Substance Use Topics    Alcohol use: Yes     Comment: rarely       Family History   Problem Relation Age of Onset    Hypertension Mother     Heart attack Father     Alcohol abuse Father     Hypertension Brother     Drug abuse Brother         heroin OD    Colonic polyp Sister     Anxiety disorder Sister     Drug abuse Sister     Heart  attack Maternal Grandmother     Heart attack Maternal Grandfather     Heart defect Paternal Grandmother     Obesity Paternal Grandmother     Heart attack Paternal Grandfather         Health Maintenance Due   Topic Date Due    Hepatitis B (1 of 3 - 3-dose series) Never done    BMI FOLLOWUP  Never done    Pneumococcal Vaccine 0-64 (1 - PCV) Never done    ZOSTER VACCINE (1 of 2) Never done    ANNUAL PHYSICAL  Never done    PAP SMEAR  Never done    DIABETIC EYE EXAM  Never done        Current Outpatient Medications on File Prior to Visit   Medication Sig    cholecalciferol (VITAMIN D3) 25 MCG (1000 UT) tablet Take 2 tablets by mouth Daily.    diclofenac (VOLTAREN) 75 MG EC tablet TAKE 1 TABLET BY MOUTH 2 (TWO) TIMES A DAY WITH MEALS. NEXT APPT DUE IN DECEMBER    fluticasone (FLONASE) 50 MCG/ACT nasal spray As Needed.    hydroCHLOROthiazide (HYDRODIURIL) 25 MG tablet Take 1 tablet by mouth Daily.    loratadine (CLARITIN) 10 MG tablet Take 1 tablet by mouth Daily.    losartan (COZAAR) 50 MG tablet Take 1 tablet by mouth Daily.    metFORMIN ER (GLUCOPHAGE-XR) 500 MG 24 hr tablet Take 1 tablet by mouth 3 (Three) Times a Day.    metoprolol succinate XL (TOPROL-XL) 50 MG 24 hr tablet Take 1 tablet by mouth Daily.    rivaroxaban (Xarelto) 20 MG tablet Take 1 tablet by mouth Daily.    rosuvastatin (Crestor) 10 MG tablet Take 1 tablet by mouth Daily.    valACYclovir (VALTREX) 1000 MG tablet TAKE 1 TABLET BY MOUTH 2 (TWO) TIMES A DAY AS NEEDED FOR COLD SORES.     No current facility-administered medications on file prior to visit.       Immunization History   Administered Date(s) Administered    COVID-19 (MODERNA) 1st,2nd,3rd Dose Monovalent 06/17/2021, 07/19/2021    Flu Vaccine Intradermal Quad 18-64YR 01/22/2013    Fluzone >6mos 12/08/2021, 01/04/2023    Hepatitis A 11/13/2018    Influenza Quad Vaccine (Inpatient) 12/09/2013    Influenza Seasonal Injectable 01/22/2013    Influenza, Unspecified 10/23/2020    Td (TDVAX)  "12/10/2020    Tdap 06/12/2012       Review of Systems   Constitutional:  Negative for fatigue and fever.   Respiratory:  Negative for cough and shortness of breath.    Cardiovascular:  Negative for chest pain, palpitations and leg swelling.      Objective     Vitals:    08/29/23 1325   BP: 139/75   BP Location: Left arm   Patient Position: Sitting   Cuff Size: Large Adult   Pulse: 93   Temp: 98.4 øF (36.9 øC)   TempSrc: Oral   Weight: 92.2 kg (203 lb 3.2 oz)   Height: 165.1 cm (65\")            Physical Exam  Vitals reviewed.   Constitutional:       General: She is not in acute distress.     Appearance: Normal appearance.   Neck:      Vascular: No carotid bruit.   Cardiovascular:      Rate and Rhythm: Normal rate and regular rhythm.      Heart sounds: Normal heart sounds. No murmur heard.  Pulmonary:      Effort: Pulmonary effort is normal. No respiratory distress.      Breath sounds: Normal breath sounds.   Musculoskeletal:      Right lower leg: No edema.      Left lower leg: No edema.   Neurological:      Mental Status: She is alert.   Psychiatric:         Mood and Affect: Mood normal.         Behavior: Behavior normal.       Result Review :     The following data was reviewed by: ELLI Savage on 08/29/2023:                       Assessment and Plan      Diagnoses and all orders for this visit:    1. Type 2 diabetes mellitus without complication, without long-term current use of insulin (Primary)  Assessment & Plan:  continue metformin  mg 3 a day, her refills were send last month, return for labs in early October, reviewed last labs; to work on diet, weight loss and regular exercise       2. Malignant neoplasm of colon, unspecified part of colon  Assessment & Plan:  Reviewed the labs ordered last October by her oncologist, CEA and CBC to be done with her next labs     Orders:  -     CEA; Future  -     CBC w AUTO Differential; Future        BMI is >= 30 and <35. (Class 1 Obesity). The following " options were offered after discussion;: exercise counseling/recommendations and nutrition counseling/recommendations         Follow Up     Return for follow up for fasting labs in early October .    Patient was given instructions and counseling regarding her condition or for health maintenance advice. Please see specific information pulled into the AVS if appropriate.

## 2023-08-29 NOTE — ASSESSMENT & PLAN NOTE
continue metformin  mg 3 a day, her refills were send last month, return for labs in early October, reviewed last labs; to work on diet, weight loss and regular exercise

## 2023-08-29 NOTE — ASSESSMENT & PLAN NOTE
Reviewed the labs ordered last October by her oncologist, CEA and CBC to be done with her next labs

## 2023-11-06 ENCOUNTER — TELEPHONE (OUTPATIENT)
Dept: FAMILY MEDICINE CLINIC | Age: 58
End: 2023-11-06
Payer: MEDICAID

## 2023-11-06 NOTE — TELEPHONE ENCOUNTER
----- Message from Maria C Alfonso LPN sent at 11/6/2023  8:04 AM EST -----      ----- Message -----  From: SYSTEM  Sent: 11/5/2023   1:37 AM EST  To: [facility] Eyal Mathias [facility]

## 2023-11-07 ENCOUNTER — LAB (OUTPATIENT)
Dept: LAB | Facility: HOSPITAL | Age: 58
End: 2023-11-07
Payer: MEDICAID

## 2023-11-07 DIAGNOSIS — C18.9 MALIGNANT NEOPLASM OF COLON, UNSPECIFIED PART OF COLON: ICD-10-CM

## 2023-11-07 DIAGNOSIS — E78.49 OTHER HYPERLIPIDEMIA: ICD-10-CM

## 2023-11-07 DIAGNOSIS — E11.9 TYPE 2 DIABETES MELLITUS WITHOUT COMPLICATION, WITHOUT LONG-TERM CURRENT USE OF INSULIN: ICD-10-CM

## 2023-11-07 LAB
ALBUMIN SERPL-MCNC: 4.5 G/DL (ref 3.5–5.2)
ALBUMIN/GLOB SERPL: 2 G/DL
ALP SERPL-CCNC: 100 U/L (ref 39–117)
ALT SERPL W P-5'-P-CCNC: 16 U/L (ref 1–33)
ANION GAP SERPL CALCULATED.3IONS-SCNC: 12.6 MMOL/L (ref 5–15)
AST SERPL-CCNC: 14 U/L (ref 1–32)
BASOPHILS # BLD AUTO: 0.01 10*3/MM3 (ref 0–0.2)
BASOPHILS NFR BLD AUTO: 0.1 % (ref 0–1.5)
BILIRUB SERPL-MCNC: 0.5 MG/DL (ref 0–1.2)
BUN SERPL-MCNC: 10 MG/DL (ref 6–20)
BUN/CREAT SERPL: 14.1 (ref 7–25)
CALCIUM SPEC-SCNC: 9.8 MG/DL (ref 8.6–10.5)
CEA SERPL-MCNC: 1.78 NG/ML
CHLORIDE SERPL-SCNC: 99 MMOL/L (ref 98–107)
CHOLEST SERPL-MCNC: 137 MG/DL (ref 0–200)
CO2 SERPL-SCNC: 26.4 MMOL/L (ref 22–29)
CREAT SERPL-MCNC: 0.71 MG/DL (ref 0.57–1)
DEPRECATED RDW RBC AUTO: 41.4 FL (ref 37–54)
EGFRCR SERPLBLD CKD-EPI 2021: 98.7 ML/MIN/1.73
EOSINOPHIL # BLD AUTO: 0.33 10*3/MM3 (ref 0–0.4)
EOSINOPHIL NFR BLD AUTO: 4.3 % (ref 0.3–6.2)
ERYTHROCYTE [DISTWIDTH] IN BLOOD BY AUTOMATED COUNT: 11.9 % (ref 12.3–15.4)
GLOBULIN UR ELPH-MCNC: 2.3 GM/DL
GLUCOSE SERPL-MCNC: 155 MG/DL (ref 65–99)
HBA1C MFR BLD: 7.7 % (ref 4.8–5.6)
HCT VFR BLD AUTO: 42 % (ref 34–46.6)
HDLC SERPL-MCNC: 61 MG/DL (ref 40–60)
HGB BLD-MCNC: 14.3 G/DL (ref 12–15.9)
IMM GRANULOCYTES # BLD AUTO: 0.01 10*3/MM3 (ref 0–0.05)
IMM GRANULOCYTES NFR BLD AUTO: 0.1 % (ref 0–0.5)
LDLC SERPL CALC-MCNC: 60 MG/DL (ref 0–100)
LDLC/HDLC SERPL: 0.97 {RATIO}
LYMPHOCYTES # BLD AUTO: 2.56 10*3/MM3 (ref 0.7–3.1)
LYMPHOCYTES NFR BLD AUTO: 33.6 % (ref 19.6–45.3)
MCH RBC QN AUTO: 31.8 PG (ref 26.6–33)
MCHC RBC AUTO-ENTMCNC: 34 G/DL (ref 31.5–35.7)
MCV RBC AUTO: 93.5 FL (ref 79–97)
MONOCYTES # BLD AUTO: 0.35 10*3/MM3 (ref 0.1–0.9)
MONOCYTES NFR BLD AUTO: 4.6 % (ref 5–12)
NEUTROPHILS NFR BLD AUTO: 4.36 10*3/MM3 (ref 1.7–7)
NEUTROPHILS NFR BLD AUTO: 57.3 % (ref 42.7–76)
PLATELET # BLD AUTO: 342 10*3/MM3 (ref 140–450)
PMV BLD AUTO: 8.8 FL (ref 6–12)
POTASSIUM SERPL-SCNC: 3.8 MMOL/L (ref 3.5–5.2)
PROT SERPL-MCNC: 6.8 G/DL (ref 6–8.5)
RBC # BLD AUTO: 4.49 10*6/MM3 (ref 3.77–5.28)
SODIUM SERPL-SCNC: 138 MMOL/L (ref 136–145)
TRIGL SERPL-MCNC: 83 MG/DL (ref 0–150)
VLDLC SERPL-MCNC: 16 MG/DL (ref 5–40)
WBC NRBC COR # BLD: 7.62 10*3/MM3 (ref 3.4–10.8)

## 2023-11-07 PROCEDURE — 85025 COMPLETE CBC W/AUTO DIFF WBC: CPT

## 2023-11-07 PROCEDURE — 36415 COLL VENOUS BLD VENIPUNCTURE: CPT

## 2023-11-07 PROCEDURE — 80053 COMPREHEN METABOLIC PANEL: CPT

## 2023-11-07 PROCEDURE — 82378 CARCINOEMBRYONIC ANTIGEN: CPT

## 2023-11-07 PROCEDURE — 83036 HEMOGLOBIN GLYCOSYLATED A1C: CPT

## 2023-11-07 PROCEDURE — 80061 LIPID PANEL: CPT

## 2023-11-21 ENCOUNTER — TRANSCRIBE ORDERS (OUTPATIENT)
Dept: ADMINISTRATIVE | Facility: HOSPITAL | Age: 58
End: 2023-11-21
Payer: MEDICAID

## 2023-11-21 ENCOUNTER — TELEPHONE (OUTPATIENT)
Dept: FAMILY MEDICINE CLINIC | Age: 58
End: 2023-11-21
Payer: MEDICAID

## 2023-11-21 DIAGNOSIS — Z12.31 SCREENING MAMMOGRAM FOR BREAST CANCER: Primary | ICD-10-CM

## 2023-11-21 DIAGNOSIS — Z12.31 BREAST CANCER SCREENING BY MAMMOGRAM: Primary | ICD-10-CM
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
## 2023-12-04 ENCOUNTER — HOSPITAL ENCOUNTER (OUTPATIENT)
Dept: MAMMOGRAPHY | Facility: HOSPITAL | Age: 58
Discharge: HOME | End: 2023-12-04
Admitting: NURSE PRACTITIONER
Payer: MEDICAID

## 2023-12-04 DIAGNOSIS — Z12.31 BREAST CANCER SCREENING BY MAMMOGRAM: ICD-10-CM

## 2023-12-04 PROCEDURE — 77067 SCR MAMMO BI INCL CAD: CPT

## 2023-12-04 PROCEDURE — 77063 BREAST TOMOSYNTHESIS BI: CPT

## 2024-01-03 ENCOUNTER — CLINICAL SUPPORT (OUTPATIENT)
Dept: FAMILY MEDICINE CLINIC | Age: 59
End: 2024-01-03
Payer: MEDICAID

## 2024-01-03 DIAGNOSIS — Z23 IMMUNIZATION DUE: Primary | ICD-10-CM

## 2024-01-03 DIAGNOSIS — E78.49 OTHER HYPERLIPIDEMIA: ICD-10-CM

## 2024-01-03 PROCEDURE — 90471 IMMUNIZATION ADMIN: CPT | Performed by: FAMILY MEDICINE

## 2024-01-03 PROCEDURE — 90686 IIV4 VACC NO PRSV 0.5 ML IM: CPT | Performed by: FAMILY MEDICINE

## 2024-01-03 RX ORDER — ROSUVASTATIN CALCIUM 10 MG/1
10 TABLET, COATED ORAL DAILY
Qty: 90 TABLET | Refills: 1 | Status: SHIPPED | OUTPATIENT
Start: 2024-01-03

## 2024-03-24 DIAGNOSIS — E11.9 TYPE 2 DIABETES MELLITUS WITHOUT COMPLICATION, WITHOUT LONG-TERM CURRENT USE OF INSULIN: ICD-10-CM

## 2024-03-25 RX ORDER — METFORMIN HYDROCHLORIDE 500 MG/1
500 TABLET, EXTENDED RELEASE ORAL 3 TIMES DAILY
Qty: 90 TABLET | Refills: 0 | Status: SHIPPED | OUTPATIENT
Start: 2024-03-25

## 2024-03-25 NOTE — TELEPHONE ENCOUNTER
Rx Refill Note  Requested Prescriptions     Pending Prescriptions Disp Refills    metFORMIN ER (GLUCOPHAGE-XR) 500 MG 24 hr tablet [Pharmacy Med Name: METFORMIN HCL  MG TABLET] 270 tablet 1     Sig: TAKE 1 TABLET BY MOUTH THREE TIMES A DAY      Last office visit with prescribing clinician: 8/29/2023   Last telemedicine visit with prescribing clinician: Visit date not found   Next office visit with prescribing clinician: Visit date not found  Antihyperglycemics Protocol Failed       Melissa Nice LPN  03/25/24, 13:08 EDT

## 2024-03-29 ENCOUNTER — OFFICE VISIT (OUTPATIENT)
Dept: FAMILY MEDICINE CLINIC | Age: 59
End: 2024-03-29
Payer: MEDICAID

## 2024-03-29 VITALS
TEMPERATURE: 98.3 F | WEIGHT: 211 LBS | OXYGEN SATURATION: 97 % | HEIGHT: 65 IN | DIASTOLIC BLOOD PRESSURE: 82 MMHG | SYSTOLIC BLOOD PRESSURE: 124 MMHG | HEART RATE: 78 BPM | BODY MASS INDEX: 35.16 KG/M2

## 2024-03-29 DIAGNOSIS — J40 BRONCHITIS: ICD-10-CM

## 2024-03-29 DIAGNOSIS — J02.9 SORE THROAT: Primary | ICD-10-CM

## 2024-03-29 PROBLEM — E72.10 DISORDER OF SULFUR-BEARING AMINO ACID METABOLISM: Status: ACTIVE | Noted: 2024-03-29

## 2024-03-29 LAB
EXPIRATION DATE: NORMAL
INTERNAL CONTROL: NORMAL
Lab: NORMAL
S PYO AG THROAT QL: NEGATIVE

## 2024-03-29 PROCEDURE — 3074F SYST BP LT 130 MM HG: CPT | Performed by: NURSE PRACTITIONER

## 2024-03-29 PROCEDURE — 3079F DIAST BP 80-89 MM HG: CPT | Performed by: NURSE PRACTITIONER

## 2024-03-29 PROCEDURE — 1160F RVW MEDS BY RX/DR IN RCRD: CPT | Performed by: NURSE PRACTITIONER

## 2024-03-29 PROCEDURE — 99213 OFFICE O/P EST LOW 20 MIN: CPT | Performed by: NURSE PRACTITIONER

## 2024-03-29 PROCEDURE — 1159F MED LIST DOCD IN RCRD: CPT | Performed by: NURSE PRACTITIONER

## 2024-03-29 PROCEDURE — 87880 STREP A ASSAY W/OPTIC: CPT | Performed by: NURSE PRACTITIONER

## 2024-03-29 PROCEDURE — 87081 CULTURE SCREEN ONLY: CPT | Performed by: NURSE PRACTITIONER

## 2024-03-29 RX ORDER — DOXYCYCLINE 100 MG/1
100 CAPSULE ORAL 2 TIMES DAILY
Qty: 20 CAPSULE | Refills: 0 | Status: SHIPPED | OUTPATIENT
Start: 2024-03-29

## 2024-03-29 RX ORDER — ALBUTEROL SULFATE 90 UG/1
2 AEROSOL, METERED RESPIRATORY (INHALATION) EVERY 4 HOURS PRN
Qty: 8 G | Refills: 0 | Status: SHIPPED | OUTPATIENT
Start: 2024-03-29

## 2024-03-29 RX ORDER — SOLIFENACIN SUCCINATE 10 MG/1
10 TABLET, FILM COATED ORAL DAILY
COMMUNITY
Start: 2024-03-19 | End: 2025-03-19

## 2024-03-29 RX ORDER — ESTRADIOL 0.1 MG/G
1 CREAM VAGINAL 2 TIMES WEEKLY
COMMUNITY
Start: 2024-03-21 | End: 2025-03-21

## 2024-03-29 RX ORDER — DEXTROMETHORPHAN HYDROBROMIDE AND PROMETHAZINE HYDROCHLORIDE 15; 6.25 MG/5ML; MG/5ML
5 SYRUP ORAL 4 TIMES DAILY PRN
Qty: 180 ML | Refills: 0 | Status: SHIPPED | OUTPATIENT
Start: 2024-03-29

## 2024-03-29 RX ORDER — METHYLPREDNISOLONE 4 MG/1
TABLET ORAL
Qty: 21 TABLET | Refills: 0 | Status: SHIPPED | OUTPATIENT
Start: 2024-03-29 | End: 2024-04-03

## 2024-03-29 RX ORDER — NYSTATIN 100000 U/G
CREAM TOPICAL
COMMUNITY
Start: 2024-03-19

## 2024-03-29 NOTE — PROGRESS NOTES
"Chief Complaint  Cough (Patient states this has been going on for 1 week not getting better, patient states she tested at home for covid was negative. ), Nasal Congestion, Sore Throat, and Chills    Subjective          Marilou Edwards presents to [facility]     Patient is a 58-year-old female who is here today for frequent cough.  Symptoms started almost 1 week ago.  She does think it started as allergic in nature after exposure to pollen.  She takes loratadine and uses Flonase year-round.  She coughs so frequently and so hard that she feels like it is glass in her throat.  Has never noted a fever.  Was wheezing on Wednesday night.  Cough is productive and sometimes clear and sometimes purulent.  Has tested herself at home for COVID 3 times and has been negative each time.  She is diabetic.  Does have a glucometer at home for which she can check her blood sugar.  Defers COVID or flu testing.     Objective   Vital Signs:   Vitals:    03/29/24 1329   BP: 124/82   BP Location: Left arm   Patient Position: Sitting   Cuff Size: Large Adult   Pulse: 78   Temp: 98.3 °F (36.8 °C)   TempSrc: Oral   SpO2: 97%   Weight: 95.7 kg (211 lb)   Height: 165.1 cm (65\")       Wt Readings from Last 3 Encounters:   03/29/24 95.7 kg (211 lb)   08/29/23 92.2 kg (203 lb 3.2 oz)   07/13/23 92.7 kg (204 lb 4.8 oz)      BP Readings from Last 3 Encounters:   03/29/24 124/82   08/29/23 139/75   07/13/23 141/61       Body mass index is 35.11 kg/m².             Physical Exam  Vitals reviewed.   Constitutional:       General: She is not in acute distress.     Appearance: Normal appearance. She is well-developed. She is obese.   HENT:      Right Ear: A middle ear effusion is present.      Left Ear: A middle ear effusion is present.      Mouth/Throat:      Pharynx: No oropharyngeal exudate or posterior oropharyngeal erythema.   Cardiovascular:      Rate and Rhythm: Normal rate and regular rhythm.      Heart sounds: " Normal heart sounds.   Pulmonary:      Effort: Pulmonary effort is normal.      Breath sounds: Wheezing present.      Comments: Crackles left lower lobe  Musculoskeletal:      Right lower leg: No edema.      Left lower leg: No edema.   Skin:     General: Skin is warm and dry.   Neurological:      General: No focal deficit present.      Mental Status: She is alert.   Psychiatric:         Attention and Perception: Attention normal.         Mood and Affect: Mood and affect normal.         Behavior: Behavior normal.           Current Outpatient Medications:     cholecalciferol (VITAMIN D3) 25 MCG (1000 UT) tablet, Take 2 tablets by mouth Daily., Disp: , Rfl:     diclofenac (VOLTAREN) 75 MG EC tablet, TAKE 1 TABLET BY MOUTH 2 (TWO) TIMES A DAY WITH MEALS. NEXT APPT DUE IN DECEMBER, Disp: 60 tablet, Rfl: 2    estradiol (ESTRACE) 0.1 MG/GM vaginal cream, Insert 1 g into the vagina 2 (Two) Times a Week., Disp: , Rfl:     fluticasone (FLONASE) 50 MCG/ACT nasal spray, As Needed., Disp: , Rfl:     hydroCHLOROthiazide (HYDRODIURIL) 25 MG tablet, Take 1 tablet by mouth Daily., Disp: 90 tablet, Rfl: 1    loratadine (CLARITIN) 10 MG tablet, Take 1 tablet by mouth Daily., Disp: , Rfl:     losartan (COZAAR) 50 MG tablet, Take 1 tablet by mouth Daily., Disp: 90 tablet, Rfl: 1    metFORMIN ER (GLUCOPHAGE-XR) 500 MG 24 hr tablet, Take 1 tablet by mouth 3 (Three) Times a Day., Disp: 90 tablet, Rfl: 0    metoprolol succinate XL (TOPROL-XL) 50 MG 24 hr tablet, Take 1 tablet by mouth Daily., Disp: 90 tablet, Rfl: 1    nystatin (MYCOSTATIN) 704410 UNIT/GM cream, Apply  topically to the appropriate area as directed., Disp: , Rfl:     rivaroxaban (Xarelto) 20 MG tablet, Take 1 tablet by mouth Daily., Disp: 90 tablet, Rfl: 1    rosuvastatin (CRESTOR) 10 MG tablet, TAKE 1 TABLET BY MOUTH EVERY DAY, Disp: 90 tablet, Rfl: 1    solifenacin (VESICARE) 10 MG tablet, Take 1 tablet by mouth Daily., Disp: , Rfl:     valACYclovir (VALTREX) 1000 MG  tablet, TAKE 1 TABLET BY MOUTH 2 (TWO) TIMES A DAY AS NEEDED FOR COLD SORES., Disp: 60 tablet, Rfl: 1    albuterol sulfate  (90 Base) MCG/ACT inhaler, Inhale 2 puffs Every 4 (Four) Hours As Needed for Wheezing., Disp: 8 g, Rfl: 0    doxycycline (MONODOX) 100 MG capsule, Take 1 capsule by mouth 2 (Two) Times a Day., Disp: 20 capsule, Rfl: 0    methylPREDNISolone (MEDROL) 4 MG dose pack, Take as directed on package instructions., Disp: 21 tablet, Rfl: 0    promethazine-dextromethorphan (PROMETHAZINE-DM) 6.25-15 MG/5ML syrup, Take 5 mL by mouth 4 (Four) Times a Day As Needed for Cough. May cause drowsiness, Disp: 180 mL, Rfl: 0   Past Medical History:   Diagnosis Date    Acquired coagulation factor deficiency     Acute maxillary sinusitis, unspecified     Allergic     Anxiety disorder     Arthritis     Colon cancer     dx'd in 2004    Colon polyp     Deep vein thrombosis     Essential (primary) hypertension     Gestational diabetes     Hyperlipidemia, unspecified     Long term (current) use of anticoagulants     Low back pain     Other fatigue     Other hereditary and idiopathic neuropathies     Other specified disorders of nose and nasal sinuses     Pain in left finger(s)     Personal history of other venous thrombosis and embolism     Prothrombin gene mutation     one gene heterozygous/liden2    Pulmonary embolism     x2    Type 2 diabetes mellitus without complication     Unspecified superficial injury of right lower leg, initial encounter      Allergies   Allergen Reactions    Adhesive Tape Rash     adhesive    Copper Chloride Rash    Metal Rash               Result Review :     Common labs          7/11/2023    07:58 11/7/2023    07:51   Common Labs   Glucose 137  155    BUN 8  10    Creatinine 0.70  0.71    Sodium 141  138    Potassium 4.1  3.8    Chloride 102  99    Calcium 9.7  9.8    Albumin 4.4  4.5    Total Bilirubin 0.3  0.5    Alkaline Phosphatase 108  100    AST (SGOT) 17  14    ALT (SGPT) 31  16     WBC  7.62    Hemoglobin  14.3    Hematocrit  42.0    Platelets  342    Total Cholesterol 210  137    Triglycerides 101  83    HDL Cholesterol 76  61    LDL Cholesterol  116  60    Hemoglobin A1C 7.50  7.70         Mammo Screening Digital Tomosynthesis Bilateral With CAD    Result Date: 2023  Benign mammogram. Suggest routine mammographic screening.  RECOMMENDATION(S):  ROUTINE MAMMOGRAM AND CLINICAL EVALUATION IN 12 MONTHS.   BIRADS:  DIAGNOSTIC CATEGORY 2--BENIGN FINDING   BREAST COMPOSITION: Scattered areas fibroglandular density.  PLEASE NOTE:  A NORMAL MAMMOGRAM DOES NOT EXCLUDE THE POSSIBILITY OF BREAST CANCER. ANY CLINICALLY SUSPICIOUS PALPABLE LUMP SHOULD BE BIOPSIED.      LUIS FERNANDO HOPE MD       Electronically Signed and Approved By: LUIS FERNANDO HOPE MD on 2023 at 13:57                      Social History     Tobacco Use   Smoking Status Former    Current packs/day: 0.00    Types: Cigarettes    Start date:     Quit date:     Years since quittin.2    Passive exposure: Past   Smokeless Tobacco Never           Assessment and Plan    Diagnoses and all orders for this visit:    1. Sore throat (Primary)  -     POCT rapid strep A  -     Beta Strep Culture, Throat - , Throat; Future  -     Beta Strep Culture, Throat - Swab, Throat    2. Bronchitis  -     doxycycline (MONODOX) 100 MG capsule; Take 1 capsule by mouth 2 (Two) Times a Day.  Dispense: 20 capsule; Refill: 0  -     albuterol sulfate  (90 Base) MCG/ACT inhaler; Inhale 2 puffs Every 4 (Four) Hours As Needed for Wheezing.  Dispense: 8 g; Refill: 0  -     promethazine-dextromethorphan (PROMETHAZINE-DM) 6.25-15 MG/5ML syrup; Take 5 mL by mouth 4 (Four) Times a Day As Needed for Cough. May cause drowsiness  Dispense: 180 mL; Refill: 0  -     methylPREDNISolone (MEDROL) 4 MG dose pack; Take as directed on package instructions.  Dispense: 21 tablet; Refill: 0        Follow Up    No follow-ups on file.  Patient was given  instructions and counseling regarding her condition or for health maintenance advice. Please see specific information pulled into the AVS if appropriate.

## 2024-03-31 LAB — BACTERIA SPEC AEROBE CULT: NORMAL

## 2024-04-02 NOTE — ASSESSMENT & PLAN NOTE
Rapid strep negative.  Throat culture is pending.  Treat for bronchitis as below.  Take doxycycline with food and not within 2 hours of dairy.  Use sunscreen while using doxycycline as it will make you more sun sensitive.  Promethazine DM may cause drowsiness.  Follow-up if symptoms or not improving.

## 2024-04-03 ENCOUNTER — OFFICE VISIT (OUTPATIENT)
Dept: FAMILY MEDICINE CLINIC | Age: 59
End: 2024-04-03
Payer: MEDICAID

## 2024-04-03 VITALS
DIASTOLIC BLOOD PRESSURE: 59 MMHG | WEIGHT: 210 LBS | SYSTOLIC BLOOD PRESSURE: 135 MMHG | TEMPERATURE: 98 F | BODY MASS INDEX: 34.99 KG/M2 | HEIGHT: 65 IN | HEART RATE: 59 BPM

## 2024-04-03 DIAGNOSIS — E78.49 OTHER HYPERLIPIDEMIA: ICD-10-CM

## 2024-04-03 DIAGNOSIS — E11.9 TYPE 2 DIABETES MELLITUS WITHOUT COMPLICATION, WITHOUT LONG-TERM CURRENT USE OF INSULIN: ICD-10-CM

## 2024-04-03 DIAGNOSIS — J40 BRONCHITIS: ICD-10-CM

## 2024-04-03 DIAGNOSIS — I10 ESSENTIAL HYPERTENSION: Primary | ICD-10-CM

## 2024-04-03 PROBLEM — J01.00 ACUTE MAXILLARY SINUSITIS: Status: RESOLVED | Noted: 2022-03-24 | Resolved: 2024-04-03

## 2024-04-03 PROBLEM — J02.9 SORE THROAT: Status: RESOLVED | Noted: 2024-03-29 | Resolved: 2024-04-03

## 2024-04-03 PROBLEM — R05.1 ACUTE COUGH: Status: RESOLVED | Noted: 2023-04-05 | Resolved: 2024-04-03

## 2024-04-03 RX ORDER — FLUTICASONE PROPIONATE 50 MCG
2 SPRAY, SUSPENSION (ML) NASAL DAILY
Qty: 16 G | Refills: 2 | Status: SHIPPED | OUTPATIENT
Start: 2024-04-03

## 2024-04-03 RX ORDER — METOPROLOL SUCCINATE 50 MG/1
50 TABLET, EXTENDED RELEASE ORAL DAILY
Qty: 90 TABLET | Refills: 1 | Status: SHIPPED | OUTPATIENT
Start: 2024-04-03

## 2024-04-03 RX ORDER — LOSARTAN POTASSIUM 50 MG/1
50 TABLET ORAL DAILY
Qty: 90 TABLET | Refills: 1 | Status: SHIPPED | OUTPATIENT
Start: 2024-04-03

## 2024-04-03 RX ORDER — METFORMIN HYDROCHLORIDE 500 MG/1
500 TABLET, EXTENDED RELEASE ORAL 3 TIMES DAILY
Qty: 270 TABLET | Refills: 1 | Status: SHIPPED | OUTPATIENT
Start: 2024-04-03 | End: 2024-04-05

## 2024-04-03 RX ORDER — HYDROCHLOROTHIAZIDE 25 MG/1
25 TABLET ORAL DAILY
Qty: 90 TABLET | Refills: 1 | Status: SHIPPED | OUTPATIENT
Start: 2024-04-03

## 2024-04-03 RX ORDER — ROSUVASTATIN CALCIUM 10 MG/1
10 TABLET, COATED ORAL DAILY
Qty: 90 TABLET | Refills: 1 | Status: SHIPPED | OUTPATIENT
Start: 2024-04-03

## 2024-04-03 NOTE — ASSESSMENT & PLAN NOTE
To work on diet, regular exercise, monitor sugars, check feet daily, see optometrist, and bring in a note or have them send.  She will return to lab in the am fasting.

## 2024-04-03 NOTE — ASSESSMENT & PLAN NOTE
Is resolving, she has completed steroids, still on ATB, if not continuing to improving, let me now, may check a CXR

## 2024-04-03 NOTE — PROGRESS NOTES
Chief Complaint  Diabetes (6 month follow up diabetes. ) HLD, & HTN    Subjective          Marilou Edwards presents to [facility]    History of Present Illness  URI  When did symptoms start> 2 weeks   Any exposures:spring allergens  Symptoms:cough and congestion   Treatment tried:doxy and steroid pack   Improving     Diabetes:  Current medication: metformin   Tolerating medication: Yes  Last eye exam: due this month   Last foot exam: > 1 year   At home BS ranges: 120  Lab Results       Component                Value               Date                       HGBA1C                   7.70 (H)            11/07/2023              Hyperlipidemia  Current medication: crestor   Tolerating medication: Yes  Needs Refill: [facility]    Lab Results       Component                Value               Date                       CHOL                     137                 11/07/2023                 CHLPL                    228 (H)             07/18/2020                 TRIG                     83                  11/07/2023                 HDL                      61 (H)              11/07/2023                 LDL                      60                  11/07/2023                Hypertension:  Current medication: toprol, HCTZ and losrtan   Tolerating Medication: Yes  Checking BP at home and it is: occ, normal systolic 120's  [facility]eds refills: [facility]   Labs:  Lab Results       Component                Value               Date                       GLUCOSE                  155 (H)             11/07/2023                 BUN                      10                  11/07/2023                 CREATININE               0.71                11/07/2023                 EGFRIFNONA               98                  12/20/2021                 BCR                      14.1                11/07/2023                 K                        3.8                 11/07/2023                 CO2                       26.4                2023                 CALCIUM                  9.8                 2023                 ALBUMIN                  4.5                 2023                 LABIL2                   1.7                 2020                 AST                      14                  2023                 ALT                      16                  2023                PAST MEDICAL HISTORY changes since 2023:         Pulmonary Embolism: x 2;     Gestational Diabetes     Prothrombin gene mutation (MTHFR) one gene heterozygous/Liden 2     Colon cancer: dx'd in 2004;         GYNECOLOGICAL HISTORY:     miscarriage 1         Surgical History:         Hernia Repair: 2009; Ventral hernia repair;     Appendectomy: ;     Colon Resection: ; oophrectmy/salp;     colostomy reversl fall ; Procedures: colonoscopy 13 normal     Dilation and Curettage: 17; hysterscopy; Other Surgeries    LiF DIP  index mucous cyst excision  19;     COLONOSCOPY: was last done 8-3-22/ normal per pt ;  17 with normal results [place]         Family History:       Father:  at age 62; Cause of death was MI;  Alcoholism     Mother: lung cancer, Hypertension T2DM     Brother(s): 3 brother(s) total; 1 ;  Hypertension;  Drug Abuse ( heroin overdose )     Sister(s): Healthy; 1 sister(s) total;  Colonic Polyps;  Anxiety; Drug Abuse     Paternal Grandfather: Cause of death was MI     Paternal Grandmother: Cause of death was heart/obesity     Maternal Grandfather:  at age 60; Cause of death was MI     Maternal Grandmother:  at age 72; Cause of death was MI;  Type 2 Diabetes         Social History:          .  Occupation: Work[employer]     Marital Status:      Children: 3 children                Past Medical History:   Diagnosis Date    Acquired coagulation factor deficiency     Acute maxillary sinusitis, unspecified      Allergic     Anxiety disorder     Arthritis     Colon cancer     dx'd in     Colon polyp     Deep vein thrombosis     Essential (primary) hypertension     Gestational diabetes     Hyperlipidemia, unspecified     Long term (current) use of anticoagulants     Low back pain     Other fatigue     Other hereditary and idiopathic neuropathies     Other specified disorders of nose and nasal sinuses     Pain in left finger(s)     Personal history of other venous thrombosis and embolism     Prothrombin gene mutation     one gene heterozygous/liden2    Pulmonary embolism     x2    Type 2 diabetes mellitus without complication     Unspecified superficial injury of right lower leg, initial encounter        Allergies   Allergen Reactions    Adhesive Tape Rash     adhesive    Copper Chloride Rash    Metal Rash        Past Surgical History:   Procedure Laterality Date    APPENDECTOMY  2005    COLON RESECTION   and 2006    oophrectmy/salp    COLONOSCOPY  2013    normal    COLONOSCOPY  2017    normal reults    COLONOSCOPY N/A 2022    repeat in 5 years    COLOSTOMY  2006    reversl    DILATION AND CURETTAGE, DIAGNOSTIC / THERAPEUTIC  2017    hysterscopy    HERNIA REPAIR  2009    ventral hernia repair    LUMBAR DIRECT LATERAL INTERBODY FUSION  2019    index mucous cyst excision        Social History     Tobacco Use    Smoking status: Former     Current packs/day: 0.00     Types: Cigarettes     Start date:      Quit date:      Years since quittin.2     Passive exposure: Past    Smokeless tobacco: Never   Substance Use Topics    Alcohol use: Not Currently     Comment: rarely       Family History   Problem Relation Age of Onset    Hypertension Mother     Heart attack Father     Alcohol abuse Father     Hypertension Brother     Drug abuse Brother         heroin OD    Colonic polyp Sister     Anxiety disorder Sister     Drug abuse Sister     Heart attack Maternal Grandmother      Heart attack Maternal Grandfather     Heart defect Paternal Grandmother     Obesity Paternal Grandmother     Heart attack Paternal Grandfather         Health Maintenance Due   Topic Date Due    Pneumococcal Vaccine 0-64 (1 of 2 - PCV) Never done    DIABETIC EYE EXAM  Never done    Hepatitis B (1 of 3 - 19+ 3-dose series) Never done    ZOSTER VACCINE (1 of 2) Never done    ANNUAL PHYSICAL  Never done    PAP SMEAR  Never done    DIABETIC FOOT EXAM  10/03/2023    URINE MICROALBUMIN  10/03/2023    HEMOGLOBIN A1C  05/07/2024        Current Outpatient Medications on File Prior to Visit   Medication Sig    albuterol sulfate  (90 Base) MCG/ACT inhaler Inhale 2 puffs Every 4 (Four) Hours As Needed for Wheezing.    cholecalciferol (VITAMIN D3) 25 MCG (1000 UT) tablet Take 2 tablets by mouth Daily.    diclofenac (VOLTAREN) 75 MG EC tablet TAKE 1 TABLET BY MOUTH 2 (TWO) TIMES A DAY WITH MEALS. NEXT APPT DUE IN DECEMBER    doxycycline (MONODOX) 100 MG capsule Take 1 capsule by mouth 2 (Two) Times a Day.    estradiol (ESTRACE) 0.1 MG/GM vaginal cream Insert 1 g into the vagina 2 (Two) Times a Week.    loratadine (CLARITIN) 10 MG tablet Take 1 tablet by mouth Daily.    nystatin (MYCOSTATIN) 134617 UNIT/GM cream Apply  topically to the appropriate area as directed.    promethazine-dextromethorphan (PROMETHAZINE-DM) 6.25-15 MG/5ML syrup Take 5 mL by mouth 4 (Four) Times a Day As Needed for Cough. May cause drowsiness    rivaroxaban (Xarelto) 20 MG tablet Take 1 tablet by mouth Daily.    solifenacin (VESICARE) 10 MG tablet Take 1 tablet by mouth Daily.    valACYclovir (VALTREX) 1000 MG tablet TAKE 1 TABLET BY MOUTH 2 (TWO) TIMES A DAY AS NEEDED FOR COLD SORES.    [DISCONTINUED] fluticasone (FLONASE) 50 MCG/ACT nasal spray As Needed.    [DISCONTINUED] hydroCHLOROthiazide (HYDRODIURIL) 25 MG tablet Take 1 tablet by mouth Daily.    [DISCONTINUED] losartan (COZAAR) 50 MG tablet Take 1 tablet by mouth Daily.    [DISCONTINUED]  "metFORMIN ER (GLUCOPHAGE-XR) 500 MG 24 hr tablet Take 1 tablet by mouth 3 (Three) Times a Day.    [DISCONTINUED] rosuvastatin (CRESTOR) 10 MG tablet TAKE 1 TABLET BY MOUTH EVERY DAY    [DISCONTINUED] methylPREDNISolone (MEDROL) 4 MG dose pack Take as directed on package instructions. (Patient not taking: Reported on 4/3/2024)    [DISCONTINUED] metoprolol succinate XL (TOPROL-XL) 50 MG 24 hr tablet Take 1 tablet by mouth Daily.     No current facility-administered medications on file prior to visit.       Immunization History   Administered Date(s) Administered    COVID-19 (MODERNA) 1st,2nd,3rd Dose Monovalent 06/17/2021, 07/19/2021    Flu Vaccine Intradermal Quad 18-64YR 01/22/2013    Fluzone (or Fluarix & Flulaval for VFC) >6mos 12/08/2021, 01/04/2023, 01/03/2024    Hepatitis A 11/13/2018    Influenza Quad Vaccine (Inpatient) 12/09/2013    Influenza Seasonal Injectable 01/22/2013    Influenza, Unspecified 10/23/2020    Td (TDVAX) 12/10/2020    Tdap 06/12/2012       Review of Systems   Constitutional:  Negative for fatigue and fever.   HENT:  Negative for sore throat.    Respiratory:  Positive for cough and shortness of breath (was but improving).    Cardiovascular:  Negative for chest pain, palpitations and leg swelling.   Allergic/Immunologic: Positive for environmental allergies (needs her flonase refilled).        Objective     Vitals:    04/03/24 1518   BP: 135/59   BP Location: Right arm   Patient Position: Sitting   Cuff Size: Large Adult   Pulse: 59   Temp: 98 °F (36.7 °C)   TempSrc: Oral   Weight: 95.3 kg (210 lb)   Height: 165.1 cm (65\")            Physical Exam  Constitutional:       General: She is not in acute distress.     Appearance: Normal appearance.   HENT:      Right Ear: Tympanic membrane, ear canal and external ear normal.      Left Ear: Tympanic membrane, ear canal and external ear normal.      Nose: Nose normal.      Mouth/Throat:      Pharynx: Oropharynx is clear. No posterior oropharyngeal " erythema.   Neck:      Vascular: No carotid bruit.   Cardiovascular:      Rate and Rhythm: Normal rate and regular rhythm.      Pulses:           Posterior tibial pulses are 2+ on the right side and 2+ on the left side.      Heart sounds: No murmur heard.  Pulmonary:      Effort: Pulmonary effort is normal. No respiratory distress.      Breath sounds: Normal breath sounds.   Musculoskeletal:         General: No swelling.   Feet:      Right foot:      Protective Sensation: 3 sites tested.  3 sites sensed.      Skin integrity: Skin integrity normal. No ulcer or blister.      Toenail Condition: Right toenails are normal.      Left foot:      Protective Sensation: 3 sites tested.  3 sites sensed.      Skin integrity: Skin integrity normal. No ulcer or blister.      Toenail Condition: Left toenails are normal.      Comments: Diabetic Foot Exam Performed and Monofilament Test Performed     Lymphadenopathy:      Cervical: No cervical adenopathy.   Neurological:      Mental Status: She is alert.   Psychiatric:         Mood and Affect: Mood normal.         Behavior: Behavior normal.         Thought Content: Thought content normal.         Result Review :     The following data was reviewed by: ELLI Savage on 04/03/2024:                       Assessment and Plan      Diagnoses and all orders for this visit:    1. Essential hypertension (Primary)  Assessment & Plan:  Hypertension is stable. Continue to monitor BP at home. Continue current meds. Continue to modify diet and lifestyle.     Orders:  -     Comprehensive Metabolic Panel; Future  -     Lipid Panel; Future  -     hydroCHLOROthiazide 25 MG tablet; Take 1 tablet by mouth Daily.  Dispense: 90 tablet; Refill: 1  -     losartan (COZAAR) 50 MG tablet; Take 1 tablet by mouth Daily.  Dispense: 90 tablet; Refill: 1  -     metoprolol succinate XL (TOPROL-XL) 50 MG 24 hr tablet; Take 1 tablet by mouth Daily.  Dispense: 90 tablet; Refill: 1    2. Other  hyperlipidemia  Assessment & Plan:   Continue current medication and efforts with diet and exercise.       Orders:  -     Comprehensive Metabolic Panel; Future  -     Lipid Panel; Future  -     rosuvastatin (CRESTOR) 10 MG tablet; Take 1 tablet by mouth Daily.  Dispense: 90 tablet; Refill: 1    3. Type 2 diabetes mellitus without complication, without long-term current use of insulin  Assessment & Plan:  To work on diet, regular exercise, monitor sugars, check feet daily, see optometrist, and bring in a note or have them send.  She will return to lab in the am fasting.     Orders:  -     Comprehensive Metabolic Panel; Future  -     Lipid Panel; Future  -     Hemoglobin A1c; Future  -     Microalbumin / Creatinine Urine Ratio - Urine, Clean Catch; Future  -     metFORMIN ER (GLUCOPHAGE-XR) 500 MG 24 hr tablet; Take 1 tablet by mouth 3 (Three) Times a Day.  Dispense: 270 tablet; Refill: 1    4. Bronchitis  Assessment & Plan:  Is resolving, she has completed steroids, still on ATB, if not continuing to improving, let me now, may check a CXR       Other orders  -     fluticasone (FLONASE) 50 MCG/ACT nasal spray; 2 sprays into the nostril(s) as directed by provider Daily.  Dispense: 16 g; Refill: 2                    Follow Up     Return if symptoms worsen or fail to improve, for follow up fasting tomorrow for labs .    Patient was given instructions and counseling regarding her condition or for health maintenance advice. Please see specific information pulled into the AVS if appropriate.

## 2024-04-04 ENCOUNTER — LAB (OUTPATIENT)
Dept: LAB | Facility: HOSPITAL | Age: 59
End: 2024-04-04
Payer: MEDICAID

## 2024-04-04 DIAGNOSIS — E11.9 TYPE 2 DIABETES MELLITUS WITHOUT COMPLICATION, WITHOUT LONG-TERM CURRENT USE OF INSULIN: ICD-10-CM

## 2024-04-04 DIAGNOSIS — I10 ESSENTIAL HYPERTENSION: ICD-10-CM

## 2024-04-04 DIAGNOSIS — E78.49 OTHER HYPERLIPIDEMIA: ICD-10-CM

## 2024-04-04 LAB
ALBUMIN SERPL-MCNC: 4.3 G/DL (ref 3.5–5.2)
ALBUMIN/GLOB SERPL: 1.7 G/DL
ALP SERPL-CCNC: 110 U/L (ref 39–117)
ALT SERPL W P-5'-P-CCNC: 25 U/L (ref 1–33)
ANION GAP SERPL CALCULATED.3IONS-SCNC: 12.4 MMOL/L (ref 5–15)
AST SERPL-CCNC: 16 U/L (ref 1–32)
BILIRUB SERPL-MCNC: 0.4 MG/DL (ref 0–1.2)
BUN SERPL-MCNC: 17 MG/DL (ref 6–20)
BUN/CREAT SERPL: 21.5 (ref 7–25)
CALCIUM SPEC-SCNC: 9.3 MG/DL (ref 8.6–10.5)
CHLORIDE SERPL-SCNC: 99 MMOL/L (ref 98–107)
CHOLEST SERPL-MCNC: 136 MG/DL (ref 0–200)
CO2 SERPL-SCNC: 26.6 MMOL/L (ref 22–29)
CREAT SERPL-MCNC: 0.79 MG/DL (ref 0.57–1)
EGFRCR SERPLBLD CKD-EPI 2021: 86.8 ML/MIN/1.73
GLOBULIN UR ELPH-MCNC: 2.5 GM/DL
GLUCOSE SERPL-MCNC: 221 MG/DL (ref 65–99)
HBA1C MFR BLD: 9.3 % (ref 4.8–5.6)
HDLC SERPL-MCNC: 62 MG/DL (ref 40–60)
LDLC SERPL CALC-MCNC: 55 MG/DL (ref 0–100)
LDLC/HDLC SERPL: 0.85 {RATIO}
POTASSIUM SERPL-SCNC: 4 MMOL/L (ref 3.5–5.2)
PROT SERPL-MCNC: 6.8 G/DL (ref 6–8.5)
SODIUM SERPL-SCNC: 138 MMOL/L (ref 136–145)
TRIGL SERPL-MCNC: 106 MG/DL (ref 0–150)
VLDLC SERPL-MCNC: 19 MG/DL (ref 5–40)

## 2024-04-04 PROCEDURE — 83036 HEMOGLOBIN GLYCOSYLATED A1C: CPT

## 2024-04-04 PROCEDURE — 80053 COMPREHEN METABOLIC PANEL: CPT

## 2024-04-04 PROCEDURE — 36415 COLL VENOUS BLD VENIPUNCTURE: CPT

## 2024-04-04 PROCEDURE — 80061 LIPID PANEL: CPT

## 2024-04-05 ENCOUNTER — LAB (OUTPATIENT)
Dept: LAB | Facility: HOSPITAL | Age: 59
End: 2024-04-05
Payer: MEDICAID

## 2024-04-05 DIAGNOSIS — E11.9 TYPE 2 DIABETES MELLITUS WITHOUT COMPLICATION, WITHOUT LONG-TERM CURRENT USE OF INSULIN: ICD-10-CM

## 2024-04-05 DIAGNOSIS — E78.49 OTHER HYPERLIPIDEMIA: ICD-10-CM

## 2024-04-05 DIAGNOSIS — I10 ESSENTIAL HYPERTENSION: ICD-10-CM

## 2024-04-05 LAB
ALBUMIN UR-MCNC: <1.2 MG/DL
CREAT UR-MCNC: 77.6 MG/DL
MICROALBUMIN/CREAT UR: NORMAL MG/G{CREAT}

## 2024-04-05 PROCEDURE — 82043 UR ALBUMIN QUANTITATIVE: CPT

## 2024-04-05 PROCEDURE — 82570 ASSAY OF URINE CREATININE: CPT

## 2024-04-05 RX ORDER — METFORMIN HYDROCHLORIDE 500 MG/1
2000 TABLET, EXTENDED RELEASE ORAL DAILY
Qty: 360 TABLET | Refills: 0 | Status: SHIPPED | OUTPATIENT
Start: 2024-04-05

## 2024-04-30 ENCOUNTER — LAB (OUTPATIENT)
Dept: LAB | Facility: HOSPITAL | Age: 59
End: 2024-04-30
Payer: MEDICAID

## 2024-04-30 ENCOUNTER — OFFICE VISIT (OUTPATIENT)
Dept: FAMILY MEDICINE CLINIC | Age: 59
End: 2024-04-30
Payer: MEDICAID

## 2024-04-30 VITALS
SYSTOLIC BLOOD PRESSURE: 138 MMHG | HEART RATE: 74 BPM | DIASTOLIC BLOOD PRESSURE: 81 MMHG | WEIGHT: 214 LBS | BODY MASS INDEX: 35.65 KG/M2 | HEIGHT: 65 IN | TEMPERATURE: 98.2 F

## 2024-04-30 DIAGNOSIS — K21.9 GASTROESOPHAGEAL REFLUX DISEASE WITHOUT ESOPHAGITIS: ICD-10-CM

## 2024-04-30 DIAGNOSIS — M25.50 ARTHRALGIA, UNSPECIFIED JOINT: ICD-10-CM

## 2024-04-30 DIAGNOSIS — K21.9 GASTROESOPHAGEAL REFLUX DISEASE WITHOUT ESOPHAGITIS: Primary | ICD-10-CM

## 2024-04-30 DIAGNOSIS — G62.9 NEUROPATHY: ICD-10-CM

## 2024-04-30 LAB
BASOPHILS # BLD AUTO: 0.01 10*3/MM3 (ref 0–0.2)
BASOPHILS NFR BLD AUTO: 0.1 % (ref 0–1.5)
CHROMATIN AB SERPL-ACNC: <10 IU/ML (ref 0–14)
CRP SERPL-MCNC: 0.41 MG/DL (ref 0–0.5)
DEPRECATED RDW RBC AUTO: 41.8 FL (ref 37–54)
EOSINOPHIL # BLD AUTO: 0.23 10*3/MM3 (ref 0–0.4)
EOSINOPHIL NFR BLD AUTO: 3.3 % (ref 0.3–6.2)
ERYTHROCYTE [DISTWIDTH] IN BLOOD BY AUTOMATED COUNT: 12 % (ref 12.3–15.4)
ERYTHROCYTE [SEDIMENTATION RATE] IN BLOOD: 11 MM/HR (ref 0–30)
HCT VFR BLD AUTO: 38.9 % (ref 34–46.6)
HGB BLD-MCNC: 12.8 G/DL (ref 12–15.9)
IMM GRANULOCYTES # BLD AUTO: 0.01 10*3/MM3 (ref 0–0.05)
IMM GRANULOCYTES NFR BLD AUTO: 0.1 % (ref 0–0.5)
LYMPHOCYTES # BLD AUTO: 2.4 10*3/MM3 (ref 0.7–3.1)
LYMPHOCYTES NFR BLD AUTO: 34.1 % (ref 19.6–45.3)
MCH RBC QN AUTO: 30.9 PG (ref 26.6–33)
MCHC RBC AUTO-ENTMCNC: 32.9 G/DL (ref 31.5–35.7)
MCV RBC AUTO: 94 FL (ref 79–97)
MONOCYTES # BLD AUTO: 0.4 10*3/MM3 (ref 0.1–0.9)
MONOCYTES NFR BLD AUTO: 5.7 % (ref 5–12)
NEUTROPHILS NFR BLD AUTO: 3.99 10*3/MM3 (ref 1.7–7)
NEUTROPHILS NFR BLD AUTO: 56.7 % (ref 42.7–76)
PLATELET # BLD AUTO: 313 10*3/MM3 (ref 140–450)
PMV BLD AUTO: 9.1 FL (ref 6–12)
RBC # BLD AUTO: 4.14 10*6/MM3 (ref 3.77–5.28)
URATE SERPL-MCNC: 3 MG/DL (ref 2.4–5.7)
WBC NRBC COR # BLD AUTO: 7.04 10*3/MM3 (ref 3.4–10.8)

## 2024-04-30 PROCEDURE — 1159F MED LIST DOCD IN RCRD: CPT | Performed by: NURSE PRACTITIONER

## 2024-04-30 PROCEDURE — 3075F SYST BP GE 130 - 139MM HG: CPT | Performed by: NURSE PRACTITIONER

## 2024-04-30 PROCEDURE — 86140 C-REACTIVE PROTEIN: CPT

## 2024-04-30 PROCEDURE — 3046F HEMOGLOBIN A1C LEVEL >9.0%: CPT | Performed by: NURSE PRACTITIONER

## 2024-04-30 PROCEDURE — 86038 ANTINUCLEAR ANTIBODIES: CPT

## 2024-04-30 PROCEDURE — 99214 OFFICE O/P EST MOD 30 MIN: CPT | Performed by: NURSE PRACTITIONER

## 2024-04-30 PROCEDURE — 86431 RHEUMATOID FACTOR QUANT: CPT

## 2024-04-30 PROCEDURE — 3079F DIAST BP 80-89 MM HG: CPT | Performed by: NURSE PRACTITIONER

## 2024-04-30 PROCEDURE — 85025 COMPLETE CBC W/AUTO DIFF WBC: CPT

## 2024-04-30 PROCEDURE — 85652 RBC SED RATE AUTOMATED: CPT

## 2024-04-30 PROCEDURE — 36415 COLL VENOUS BLD VENIPUNCTURE: CPT

## 2024-04-30 PROCEDURE — 1160F RVW MEDS BY RX/DR IN RCRD: CPT | Performed by: NURSE PRACTITIONER

## 2024-04-30 PROCEDURE — 84550 ASSAY OF BLOOD/URIC ACID: CPT

## 2024-04-30 RX ORDER — PANTOPRAZOLE SODIUM 40 MG/1
40 TABLET, DELAYED RELEASE ORAL DAILY
Qty: 30 TABLET | Refills: 2 | Status: SHIPPED | OUTPATIENT
Start: 2024-04-30

## 2024-04-30 NOTE — PROGRESS NOTES
Chief Complaint  trouble swallowing (Trouble swallowing and heartburn x 8 or 9 weeks)    Subjective          Marilou Edwards presents to [facility]    History of Present Illness  GI symptoms:   Difficulty swallowing and heartburn   Symptoms started:> 2 months ago   Associated symptoms: having allergy symptoms as well, drainage at times    Treatment tried: TUMS  Did start to take her rx's differently and not taking and then laying down    PAST MEDICAL HISTORY changes since 4-3-2024:         Pulmonary Embolism: x 2;     Gestational Diabetes     Prothrombin gene mutation (MTHFR) one gene heterozygous/Liden 2     Colon cancer: dx'd in 2004;         GYNECOLOGICAL HISTORY:     miscarriage 1         Surgical History:         Hernia Repair: 2009; Ventral hernia repair;     Appendectomy: ;     Colon Resection: /; oophrectmy/salp;     colostomy reversl 2006; Procedures: colonoscopy 13 normal     Dilation and Curettage: 17; hysterscopy; Other Surgeries    LiF DIP  index mucous cyst excision  19;     COLONOSCOPY: was last done 8-3-22/ normal per pt ;  17 [facility]         Family History:        Father:  at age 62; Cause of death was MI;  Alcoholism     Mother: lung cancer, Hypertension T2DM     Brother(s): 3 brother(s) total; 1 ;  Hypertension;  Drug Abuse ( heroin overdose )     Sister(s): Healthy; 1 sister(s) total;  Colonic Polyps;  Anxiety; Drug Abuse     Paternal Grandfather: Cause of death was MI     Paternal Grandmother: Cause of death was heart/obesity     Maternal Grandfather:  at age 60; Cause of death was MI     Maternal Grandmother:  at age 72; Cause of death was MI;  Type 2 Diabetes         Social History:       Occupation: [employer]     Marital Status:      Children: 3 children                Past Medical History:   Diagnosis Date    Acquired coagulation factor  deficiency     Acute maxillary sinusitis, unspecified     Allergic     Anxiety disorder     Arthritis     Colon cancer     dx'd in     Colon polyp     Deep vein thrombosis     Essential (primary) hypertension     Gestational diabetes     Hyperlipidemia, unspecified     Long term (current) use of anticoagulants     Low back pain     Other fatigue     Other hereditary and idiopathic neuropathies     Other specified disorders of nose and nasal sinuses     Pain in left finger(s)     Personal history of other venous thrombosis and embolism     Prothrombin gene mutation     one gene heterozygous/liden2    Pulmonary embolism     x2    Type 2 diabetes mellitus without complication     Unspecified superficial injury of right lower leg, initial encounter        Allergies   Allergen Reactions    Adhesive Tape Rash     adhesive    Copper Chloride Rash    Metal Rash        Past Surgical History:   Procedure Laterality Date    APPENDECTOMY  2005    COLON RESECTION  2005 and 2006    oophrectmy/salp    COLONOSCOPY  2013    normal    COLONOSCOPY  2017    normal reults    COLONOSCOPY N/A 2022    repeat in 5 years    COLOSTOMY  2006    reversl    DILATION AND CURETTAGE, DIAGNOSTIC / THERAPEUTIC  2017    hysterscopy    FINGER SURGERY Left 2019    cyst removed    HERNIA REPAIR  2009    ventral hernia repair        Social History     Tobacco Use    Smoking status: Former     Current packs/day: 0.00     Types: Cigarettes     Start date:      Quit date:      Years since quittin.3     Passive exposure: Past    Smokeless tobacco: Never   Substance Use Topics    Alcohol use: Not Currently     Comment: rarely       Family History   Problem Relation Age of Onset    Hypertension Mother     Heart attack Father     Alcohol abuse Father     Hypertension Brother     Drug abuse Brother         heroin OD    Colonic polyp Sister     Anxiety disorder Sister     Drug abuse Sister     Heart attack  Maternal Grandmother     Heart attack Maternal Grandfather     Heart defect Paternal Grandmother     Obesity Paternal Grandmother     Heart attack Paternal Grandfather         Health Maintenance Due   Topic Date Due    Pneumococcal Vaccine 0-64 (1 of 2 - PCV) Never done    DIABETIC EYE EXAM  Never done    Hepatitis B (1 of 3 - 19+ 3-dose series) Never done    ZOSTER VACCINE (1 of 2) Never done    ANNUAL PHYSICAL  Never done    PAP SMEAR  Never done        Current Outpatient Medications on File Prior to Visit   Medication Sig    albuterol sulfate  (90 Base) MCG/ACT inhaler Inhale 2 puffs Every 4 (Four) Hours As Needed for Wheezing.    cholecalciferol (VITAMIN D3) 25 MCG (1000 UT) tablet Take 2 tablets by mouth Daily.    diclofenac (VOLTAREN) 75 MG EC tablet TAKE 1 TABLET BY MOUTH 2 (TWO) TIMES A DAY WITH MEALS. NEXT APPT DUE IN DECEMBER    estradiol (ESTRACE) 0.1 MG/GM vaginal cream Insert 1 g into the vagina 2 (Two) Times a Week.    fluticasone (FLONASE) 50 MCG/ACT nasal spray 2 sprays into the nostril(s) as directed by provider Daily.    hydroCHLOROthiazide 25 MG tablet Take 1 tablet by mouth Daily.    loratadine (CLARITIN) 10 MG tablet Take 1 tablet by mouth Daily.    losartan (COZAAR) 50 MG tablet Take 1 tablet by mouth Daily.    metFORMIN ER (GLUCOPHAGE-XR) 500 MG 24 hr tablet Take 4 tablets by mouth Daily.    metoprolol succinate XL (TOPROL-XL) 50 MG 24 hr tablet Take 1 tablet by mouth Daily.    nystatin (MYCOSTATIN) 420744 UNIT/GM cream Apply  topically to the appropriate area as directed.    rivaroxaban (Xarelto) 20 MG tablet Take 1 tablet by mouth Daily.    rosuvastatin (CRESTOR) 10 MG tablet Take 1 tablet by mouth Daily.    solifenacin (VESICARE) 10 MG tablet Take 1 tablet by mouth Daily.    valACYclovir (VALTREX) 1000 MG tablet TAKE 1 TABLET BY MOUTH 2 (TWO) TIMES A DAY AS NEEDED FOR COLD SORES.    [DISCONTINUED] doxycycline (MONODOX) 100 MG capsule Take 1 capsule by mouth 2 (Two) Times a Day.     "[DISCONTINUED] promethazine-dextromethorphan (PROMETHAZINE-DM) 6.25-15 MG/5ML syrup Take 5 mL by mouth 4 (Four) Times a Day As Needed for Cough. May cause drowsiness (Patient not taking: Reported on 4/30/2024)     No current facility-administered medications on file prior to visit.       Immunization History   Administered Date(s) Administered    COVID-19 (MODERNA) 1st,2nd,3rd Dose Monovalent 06/17/2021, 07/19/2021    Flu Vaccine Intradermal Quad 18-64YR 01/22/2013    Fluzone (or Fluarix & Flulaval for VFC) >6mos 12/08/2021, 01/04/2023, 01/03/2024    Hepatitis A 11/13/2018    Influenza Quad Vaccine (Inpatient) 12/09/2013    Influenza Seasonal Injectable 01/22/2013    Influenza, Unspecified 10/23/2020    Td (TDVAX) 12/10/2020    Tdap 06/12/2012       Review of Systems   Constitutional:  Negative for fatigue and fever.   Respiratory:  Negative for cough and shortness of breath.    Cardiovascular:  Negative for chest pain, palpitations and leg swelling.   Musculoskeletal:  Positive for arthralgias (diclofenac not helping).        Hips lock on her; worse with going down hill Left knee pain flared recently         Objective     Vitals:    04/30/24 1337   BP: 138/81   BP Location: Right arm   Patient Position: Sitting   Cuff Size: Large Adult   Pulse: 74   Temp: 98.2 °F (36.8 °C)   TempSrc: Oral   Weight: 97.1 kg (214 lb)   Height: 165.1 cm (65\")            Physical Exam  Vitals reviewed.   Constitutional:       General: She is not in acute distress.     Appearance: Normal appearance.   Neck:      Vascular: No carotid bruit.   Cardiovascular:      Rate and Rhythm: Normal rate and regular rhythm.      Heart sounds: Normal heart sounds. No murmur heard.  Pulmonary:      Effort: Pulmonary effort is normal. No respiratory distress.      Breath sounds: Normal breath sounds.   Musculoskeletal:         General: No tenderness (to left knee and full ROM with out pain).      Right lower leg: No edema.      Left lower leg: No edema. "   Neurological:      Mental Status: She is alert.   Psychiatric:         Mood and Affect: Mood normal.         Behavior: Behavior normal.         Result Review :     The following data was reviewed by: ELLI Savage on 04/30/2024:                       Assessment and Plan      Diagnoses and all orders for this visit:    1. Gastroesophageal reflux disease without esophagitis (Primary)  Assessment & Plan:  Tracy of PPI, discussed life style modifications, to let me know in the next few weeks if symptoms are not improving, will send for a consult for an EGD     Orders:  -     pantoprazole (Protonix) 40 MG EC tablet; Take 1 tablet by mouth Daily.  Dispense: 30 tablet; Refill: 2  -     CBC w AUTO Differential; Future    2. Neuropathy  Assessment & Plan:  Discussed neuropathy, checking labs, consider further evaluation/treatment       3. Arthralgia, unspecified joint  Assessment & Plan:  Checking some labs; to stop diclofenac      Orders:  -     C-reactive Protein; Future  -     Sedimentation Rate; Future  -     BRUNILDA Direct Reflex to 11 Biomarker; Future  -     Uric Acid; Future  -     Rheumatoid Factor; Future                  Follow Up     Return if symptoms worsen or fail to improve, for followup pending lab results.    Patient was given instructions and counseling regarding her condition or for health maintenance advice. Please see specific information pulled into the AVS if appropriate.

## 2024-05-02 LAB — ANA SER QL: NEGATIVE

## 2024-05-15 ENCOUNTER — OFFICE VISIT (OUTPATIENT)
Dept: FAMILY MEDICINE CLINIC | Age: 59
End: 2024-05-15
Payer: MEDICAID

## 2024-05-15 VITALS
WEIGHT: 211.8 LBS | DIASTOLIC BLOOD PRESSURE: 50 MMHG | SYSTOLIC BLOOD PRESSURE: 118 MMHG | BODY MASS INDEX: 35.29 KG/M2 | HEART RATE: 76 BPM | OXYGEN SATURATION: 96 % | RESPIRATION RATE: 18 BRPM | TEMPERATURE: 98.1 F | HEIGHT: 65 IN

## 2024-05-15 DIAGNOSIS — E11.9 TYPE 2 DIABETES MELLITUS WITHOUT COMPLICATION, WITHOUT LONG-TERM CURRENT USE OF INSULIN: ICD-10-CM

## 2024-05-15 DIAGNOSIS — R39.11 URINARY HESITANCY: ICD-10-CM

## 2024-05-15 DIAGNOSIS — R05.1 ACUTE COUGH: Primary | ICD-10-CM

## 2024-05-15 DIAGNOSIS — J20.9 ACUTE BRONCHITIS, UNSPECIFIED ORGANISM: ICD-10-CM

## 2024-05-15 PROBLEM — S20.211A CONTUSION OF RIGHT CHEST WALL: Status: RESOLVED | Noted: 2022-06-14 | Resolved: 2024-05-15

## 2024-05-15 PROCEDURE — 1159F MED LIST DOCD IN RCRD: CPT | Performed by: NURSE PRACTITIONER

## 2024-05-15 PROCEDURE — 1126F AMNT PAIN NOTED NONE PRSNT: CPT | Performed by: NURSE PRACTITIONER

## 2024-05-15 PROCEDURE — 3074F SYST BP LT 130 MM HG: CPT | Performed by: NURSE PRACTITIONER

## 2024-05-15 PROCEDURE — 1160F RVW MEDS BY RX/DR IN RCRD: CPT | Performed by: NURSE PRACTITIONER

## 2024-05-15 PROCEDURE — 99214 OFFICE O/P EST MOD 30 MIN: CPT | Performed by: NURSE PRACTITIONER

## 2024-05-15 PROCEDURE — 3078F DIAST BP <80 MM HG: CPT | Performed by: NURSE PRACTITIONER

## 2024-05-15 PROCEDURE — 3046F HEMOGLOBIN A1C LEVEL >9.0%: CPT | Performed by: NURSE PRACTITIONER

## 2024-05-15 RX ORDER — ALBUTEROL SULFATE 90 UG/1
2 AEROSOL, METERED RESPIRATORY (INHALATION) EVERY 6 HOURS PRN
Qty: 8 G | Refills: 1 | Status: SHIPPED | OUTPATIENT
Start: 2024-05-15

## 2024-05-15 RX ORDER — CEFDINIR 300 MG/1
300 CAPSULE ORAL 2 TIMES DAILY
Qty: 20 CAPSULE | Refills: 0 | Status: SHIPPED | OUTPATIENT
Start: 2024-05-15

## 2024-05-15 RX ORDER — METHYLPREDNISOLONE 4 MG/1
TABLET ORAL
Qty: 21 TABLET | Refills: 0 | Status: SHIPPED | OUTPATIENT
Start: 2024-05-15

## 2024-05-15 NOTE — ASSESSMENT & PLAN NOTE
Rest, increase fluids, follow up if symptoms progress or change   continue Flonase and mucinex, switch to xyzal

## 2024-05-15 NOTE — PROGRESS NOTES
Marilou Edwards presents to [facility].    Chief Complaint:  Cough (Pt c/o cough/congestion/) and Urinary Tract Infection (Cough 1 week //UTI day ago)         History of Present Illness:    URI  When did symptoms start: one week ago   Any exposures:  in hospital with COPD and daughter with similar symptoms  Symptoms: cough and nasal and chest congestion, occ productive, light green   Treatment tried: zyrtec & mucinex   Took a home test for covid:  neg     UTI:  Symptoms started:1 day ago   Associated symptoms:urinary hesitancy and occ right flank pain    Treatments tried:nothing     PAST MEDICAL HISTORY changes since 2024:         Pulmonary Embolism: x 2;     Gestational Diabetes     Prothrombin gene mutation (MTHFR) one gene heterozygous/Liden 2     Colon cancer: dx'd in 2004;         GYNECOLOGICAL HISTORY:     miscarriage 1         Surgical History:         Hernia Repair: 2009; Ventral hernia repair;     Appendectomy: ;     Colon Resection: /; oophrectmy/salp;     colostomy reversl 2006; Procedures: colonoscopy 13 normal     Dilation and Curettage: 17; hysterscopy; Other Surgeries    LiF DIP  index mucous cyst excision  19;     COLONOSCOPY: was last done 8-3-22/ normal per pt ;  17 with normal results [place]         Family History:        Father:  at age 62; Cause of death was MI;  Alcoholism     Mother: lung cancer, Hypertension T2DM     Brother(s): 3 brother(s) total; 1 ;  Hypertension;  Drug Abuse ( heroin overdose )     Sister(s): Healthy; 1 sister(s) total;  Colonic Polyps;  Anxiety; Drug Abuse     Paternal Grandfather: Cause of death was MI     Paternal Grandmother: Cause of death was heart/obesity     Maternal Grandfather:  at age 60; Cause of death was MI     Maternal Grandmother:  at age 72; Cause of death was MI;  Type 2 Diabetes         Social History:       Occupation: [employer]  [employer]     Marital Status:      Children: 3 children                      Review of Systems:  Review of Systems   Constitutional:  Negative for fever.   HENT:  Positive for congestion and sore throat (occ /itching).    Respiratory:  Positive for cough and wheezing (occ). Negative for shortness of breath.    Cardiovascular:  Negative for chest pain.   Genitourinary:  Negative for hematuria.          Current Outpatient Medications:     albuterol sulfate  (90 Base) MCG/ACT inhaler, Inhale 2 puffs Every 6 (Six) Hours As Needed for Wheezing., Disp: 8 g, Rfl: 1    diclofenac (VOLTAREN) 75 MG EC tablet, TAKE 1 TABLET BY MOUTH 2 (TWO) TIMES A DAY WITH MEALS. NEXT APPT DUE IN DECEMBER, Disp: 60 tablet, Rfl: 2    estradiol (ESTRACE) 0.1 MG/GM vaginal cream, Insert 1 g into the vagina 2 (Two) Times a Week., Disp: , Rfl:     fluticasone (FLONASE) 50 MCG/ACT nasal spray, 2 sprays into the nostril(s) as directed by provider Daily., Disp: 16 g, Rfl: 2    hydroCHLOROthiazide 25 MG tablet, Take 1 tablet by mouth Daily., Disp: 90 tablet, Rfl: 1    loratadine (CLARITIN) 10 MG tablet, Take 1 tablet by mouth Daily., Disp: , Rfl:     losartan (COZAAR) 50 MG tablet, Take 1 tablet by mouth Daily., Disp: 90 tablet, Rfl: 1    metFORMIN ER (GLUCOPHAGE-XR) 500 MG 24 hr tablet, Take 4 tablets by mouth Daily., Disp: 360 tablet, Rfl: 0    metoprolol succinate XL (TOPROL-XL) 50 MG 24 hr tablet, Take 1 tablet by mouth Daily., Disp: 90 tablet, Rfl: 1    nystatin (MYCOSTATIN) 922036 UNIT/GM cream, Apply  topically to the appropriate area as directed., Disp: , Rfl:     pantoprazole (Protonix) 40 MG EC tablet, Take 1 tablet by mouth Daily., Disp: 30 tablet, Rfl: 2    rivaroxaban (Xarelto) 20 MG tablet, Take 1 tablet by mouth Daily., Disp: 90 tablet, Rfl: 1    rosuvastatin (CRESTOR) 10 MG tablet, Take 1 tablet by mouth Daily., Disp: 90 tablet, Rfl: 1    solifenacin (VESICARE) 10 MG tablet, Take 1 tablet by mouth Daily., Disp: , Rfl:      "valACYclovir (VALTREX) 1000 MG tablet, TAKE 1 TABLET BY MOUTH 2 (TWO) TIMES A DAY AS NEEDED FOR COLD SORES., Disp: 60 tablet, Rfl: 1    cefdinir (OMNICEF) 300 MG capsule, Take 1 capsule by mouth 2 (Two) Times a Day., Disp: 20 capsule, Rfl: 0    cholecalciferol (VITAMIN D3) 25 MCG (1000 UT) tablet, Take 2 tablets by mouth Daily., Disp: , Rfl:     methylPREDNISolone (MEDROL) 4 MG dose pack, Take as directed on package instructions, with food., Disp: 21 tablet, Rfl: 0    Vital Signs:   Vitals:    05/15/24 0848   BP: 118/50   BP Location: Left arm   Patient Position: Sitting   Cuff Size: Adult   Pulse: 76   Resp: 18   Temp: 98.1 °F (36.7 °C)   TempSrc: Oral   SpO2: 96%  Comment: room air   Weight: 96.1 kg (211 lb 12.8 oz)   Height: 165.1 cm (65.01\")              Physical Exam:  Physical Exam  Constitutional:       General: She is not in acute distress.     Appearance: Normal appearance.   HENT:      Right Ear: Tympanic membrane, ear canal and external ear normal.      Left Ear: Tympanic membrane, ear canal and external ear normal.      Nose: Congestion present.      Mouth/Throat:      Pharynx: Oropharynx is clear. No posterior oropharyngeal erythema.   Cardiovascular:      Rate and Rhythm: Normal rate and regular rhythm.      Heart sounds: No murmur heard.  Pulmonary:      Effort: Pulmonary effort is normal.      Breath sounds: Wheezing (heard intermittently with expiration) present.   Abdominal:      Tenderness: There is no right CVA tenderness or left CVA tenderness.   Lymphadenopathy:      Cervical: No cervical adenopathy.   Neurological:      Mental Status: She is alert.   Psychiatric:         Mood and Affect: Mood normal.         Behavior: Behavior normal.         Result Review      The following data was reviewed by: ELLI Savage on 05/15/2024:    Results for orders placed or performed in visit on 04/30/24   C-reactive Protein    Specimen: Blood   Result Value Ref Range    C-Reactive Protein 0.41 " 0.00 - 0.50 mg/dL   Sedimentation Rate    Specimen: Blood   Result Value Ref Range    Sed Rate 11 0 - 30 mm/hr   BRUNILDA Direct Reflex to 11 Biomarker    Specimen: Blood   Result Value Ref Range    BRUNILDA Direct Negative Negative   Uric Acid    Specimen: Blood   Result Value Ref Range    Uric Acid 3.0 2.4 - 5.7 mg/dL   Rheumatoid Factor    Specimen: Blood   Result Value Ref Range    Rheumatoid Factor Quantitative <10.0 0.0 - 14.0 IU/mL   CBC Auto Differential    Specimen: Blood   Result Value Ref Range    WBC 7.04 3.40 - 10.80 10*3/mm3    RBC 4.14 3.77 - 5.28 10*6/mm3    Hemoglobin 12.8 12.0 - 15.9 g/dL    Hematocrit 38.9 34.0 - 46.6 %    MCV 94.0 79.0 - 97.0 fL    MCH 30.9 26.6 - 33.0 pg    MCHC 32.9 31.5 - 35.7 g/dL    RDW 12.0 (L) 12.3 - 15.4 %    RDW-SD 41.8 37.0 - 54.0 fl    MPV 9.1 6.0 - 12.0 fL    Platelets 313 140 - 450 10*3/mm3    Neutrophil % 56.7 42.7 - 76.0 %    Lymphocyte % 34.1 19.6 - 45.3 %    Monocyte % 5.7 5.0 - 12.0 %    Eosinophil % 3.3 0.3 - 6.2 %    Basophil % 0.1 0.0 - 1.5 %    Immature Grans % 0.1 0.0 - 0.5 %    Neutrophils, Absolute 3.99 1.70 - 7.00 10*3/mm3    Lymphocytes, Absolute 2.40 0.70 - 3.10 10*3/mm3    Monocytes, Absolute 0.40 0.10 - 0.90 10*3/mm3    Eosinophils, Absolute 0.23 0.00 - 0.40 10*3/mm3    Basophils, Absolute 0.01 0.00 - 0.20 10*3/mm3    Immature Grans, Absolute 0.01 0.00 - 0.05 10*3/mm3               Assessment and Plan:          Diagnoses and all orders for this visit:    1. Acute cough (Primary)  Assessment & Plan:  Rest, increase fluids, follow up if symptoms progress or change   continue Flonase and mucinex, switch to xyzal    Orders:  -     Cancel: POCT SARS-CoV-2 Antigen BRENNEN + Flu    2. Acute bronchitis, unspecified organism  Assessment & Plan:  Will cover her with steroid pack ATB and refilled her albuterol inhaler     Orders:  -     cefdinir (OMNICEF) 300 MG capsule; Take 1 capsule by mouth 2 (Two) Times a Day.  Dispense: 20 capsule; Refill: 0  -     methylPREDNISolone  (MEDROL) 4 MG dose pack; Take as directed on package instructions, with food.  Dispense: 21 tablet; Refill: 0  -     albuterol sulfate  (90 Base) MCG/ACT inhaler; Inhale 2 puffs Every 6 (Six) Hours As Needed for Wheezing.  Dispense: 8 g; Refill: 1    3. Urinary hesitancy  Assessment & Plan:  continue to drink adequate water       4. Type 2 diabetes mellitus without complication, without long-term current use of insulin  Assessment & Plan:  Monitor sugars closely, discussed steroids impact on her sugars                     Follow Up   Return if symptoms worsen or fail to improve.  Patient was given instructions and counseling regarding her condition or for health maintenance advice. Please see specific information pulled into the AVS if appropriate.

## 2024-07-03 RX ORDER — FLUTICASONE PROPIONATE 50 MCG
2 SPRAY, SUSPENSION (ML) NASAL DAILY
Qty: 16 ML | Refills: 2 | Status: SHIPPED | OUTPATIENT
Start: 2024-07-03

## 2024-07-15 DIAGNOSIS — Z86.711 HISTORY OF PULMONARY EMBOLUS (PE): ICD-10-CM

## 2024-07-15 RX ORDER — RIVAROXABAN 20 MG/1
20 TABLET, FILM COATED ORAL DAILY
Qty: 90 TABLET | Refills: 1 | Status: SHIPPED | OUTPATIENT
Start: 2024-07-15

## 2024-07-26 DIAGNOSIS — K21.9 GASTROESOPHAGEAL REFLUX DISEASE WITHOUT ESOPHAGITIS: ICD-10-CM

## 2024-07-26 RX ORDER — PANTOPRAZOLE SODIUM 40 MG/1
40 TABLET, DELAYED RELEASE ORAL DAILY
Qty: 90 TABLET | Refills: 0 | Status: SHIPPED | OUTPATIENT
Start: 2024-07-26

## 2024-08-12 DIAGNOSIS — J20.9 ACUTE BRONCHITIS, UNSPECIFIED ORGANISM: ICD-10-CM

## 2024-08-12 RX ORDER — ALBUTEROL SULFATE 90 UG/1
2 AEROSOL, METERED RESPIRATORY (INHALATION) EVERY 6 HOURS PRN
Qty: 8.5 G | Refills: 1 | Status: SHIPPED | OUTPATIENT
Start: 2024-08-12

## 2024-09-27 DIAGNOSIS — I10 ESSENTIAL HYPERTENSION: ICD-10-CM

## 2024-09-27 DIAGNOSIS — E78.49 OTHER HYPERLIPIDEMIA: ICD-10-CM

## 2024-09-27 RX ORDER — METOPROLOL SUCCINATE 50 MG/1
50 TABLET, EXTENDED RELEASE ORAL DAILY
Qty: 90 TABLET | Refills: 0 | Status: SHIPPED | OUTPATIENT
Start: 2024-09-27

## 2024-09-27 RX ORDER — ROSUVASTATIN CALCIUM 10 MG/1
10 TABLET, COATED ORAL DAILY
Qty: 90 TABLET | Refills: 0 | Status: SHIPPED | OUTPATIENT
Start: 2024-09-27

## 2024-10-02 ENCOUNTER — TELEPHONE (OUTPATIENT)
Dept: FAMILY MEDICINE CLINIC | Age: 59
End: 2024-10-02
Payer: MEDICAID

## 2024-10-02 NOTE — TELEPHONE ENCOUNTER
Received a fax from [facility] requesting clearance for aesthetic and reconstructive surgery on October 11, 2024 with Dr. Barrett.  They need to know if the Xarelto can be discontinued 2 days prior to surgery since Mary Morales prescribes this.  Paperwork placed on providers desk for approval.

## 2024-10-09 DIAGNOSIS — E11.9 TYPE 2 DIABETES MELLITUS WITHOUT COMPLICATION, WITHOUT LONG-TERM CURRENT USE OF INSULIN: ICD-10-CM

## 2024-10-09 NOTE — TELEPHONE ENCOUNTER
Rx Refill Note  Requested Prescriptions     Pending Prescriptions Disp Refills    metFORMIN ER (GLUCOPHAGE-XR) 500 MG 24 hr tablet [Pharmacy Med Name: METFORMIN HCL  MG TABLET] 360 tablet 0     Sig: TAKE 4 TABLETS BY MOUTH DAILY      Last office visit with prescribing clinician: 5/15/2024   Last telemedicine visit with prescribing clinician: Visit date not found   Next office visit with prescribing clinician: 10/22/2024  Antihyperglycemics Protocol Failed     Melissa Nice LPN  10/09/24, 10:39 EDT

## 2024-10-10 RX ORDER — METFORMIN HCL 500 MG
2000 TABLET, EXTENDED RELEASE 24 HR ORAL DAILY
Qty: 360 TABLET | Refills: 0 | Status: SHIPPED | OUTPATIENT
Start: 2024-10-10

## 2024-10-11 DIAGNOSIS — E11.9 TYPE 2 DIABETES MELLITUS WITHOUT COMPLICATION, WITHOUT LONG-TERM CURRENT USE OF INSULIN: ICD-10-CM

## 2024-10-11 RX ORDER — METFORMIN HCL 500 MG
500 TABLET, EXTENDED RELEASE 24 HR ORAL 3 TIMES DAILY
Qty: 270 TABLET | Refills: 1 | OUTPATIENT
Start: 2024-10-11

## 2024-10-14 ENCOUNTER — TRANSCRIBE ORDERS (OUTPATIENT)
Dept: ADMINISTRATIVE | Facility: HOSPITAL | Age: 59
End: 2024-10-14
Payer: MEDICAID

## 2024-10-14 ENCOUNTER — TELEPHONE (OUTPATIENT)
Dept: FAMILY MEDICINE CLINIC | Age: 59
End: 2024-10-14
Payer: MEDICAID

## 2024-10-14 ENCOUNTER — LAB (OUTPATIENT)
Dept: LAB | Facility: HOSPITAL | Age: 59
End: 2024-10-14
Payer: MEDICAID

## 2024-10-14 ENCOUNTER — CLINICAL SUPPORT (OUTPATIENT)
Dept: FAMILY MEDICINE CLINIC | Age: 59
End: 2024-10-14
Payer: MEDICAID

## 2024-10-14 ENCOUNTER — TRANSCRIBE ORDERS (OUTPATIENT)
Dept: FAMILY MEDICINE CLINIC | Age: 59
End: 2024-10-14
Payer: MEDICAID

## 2024-10-14 DIAGNOSIS — H02.834 DERMATOCHALASIS OF LEFT UPPER EYELID: ICD-10-CM

## 2024-10-14 DIAGNOSIS — H02.831 DERMATOCHALASIS OF RIGHT UPPER EYELID: ICD-10-CM

## 2024-10-14 DIAGNOSIS — H02.422 MYOGENIC PTOSIS OF LEFT EYELID: ICD-10-CM

## 2024-10-14 DIAGNOSIS — H02.421 MYOGENIC PTOSIS OF RIGHT EYELID: Primary | ICD-10-CM

## 2024-10-14 DIAGNOSIS — Z01.818 PRE-OP TESTING: Primary | ICD-10-CM

## 2024-10-14 DIAGNOSIS — H02.421 MYOGENIC PTOSIS OF RIGHT EYELID: ICD-10-CM

## 2024-10-14 PROCEDURE — 93000 ELECTROCARDIOGRAM COMPLETE: CPT | Performed by: FAMILY MEDICINE

## 2024-10-14 PROCEDURE — 36415 COLL VENOUS BLD VENIPUNCTURE: CPT

## 2024-10-14 PROCEDURE — 80048 BASIC METABOLIC PNL TOTAL CA: CPT

## 2024-10-14 NOTE — TELEPHONE ENCOUNTER
Pt came in today for a EKG, she has a order from [facility].   A Carmen approved for her to get a EKG.

## 2024-10-14 NOTE — TELEPHONE ENCOUNTER
Patient states her last surgery was a removal of cyst on finger about 4 -5 years ago. Patient states she was on warfarin at that and held it for 7 days. Patient is on xarelto 20 mg. Patient states she is to hold her Xarelto 2 days before her eye procedure with Odell. Patient states that Hernesto and Kemar need the paperwork to  hold her Xeralto 2 days before surgery. EKG performed, Result on on call Dr Hope for review.

## 2024-10-14 NOTE — TELEPHONE ENCOUNTER
call pt and check with her,  when did she hold her xarelto, for what procedure in the past, see which hematologist she saw in the past for her clotting disorder, I have checked with Dr Hope re holding this rx and he has some questions.

## 2024-10-15 LAB
ANION GAP SERPL CALCULATED.3IONS-SCNC: 12.7 MMOL/L (ref 5–15)
BUN SERPL-MCNC: 11 MG/DL (ref 6–20)
BUN/CREAT SERPL: 13.4 (ref 7–25)
CALCIUM SPEC-SCNC: 9.8 MG/DL (ref 8.6–10.5)
CHLORIDE SERPL-SCNC: 99 MMOL/L (ref 98–107)
CO2 SERPL-SCNC: 27.3 MMOL/L (ref 22–29)
CREAT SERPL-MCNC: 0.82 MG/DL (ref 0.57–1)
EGFRCR SERPLBLD CKD-EPI 2021: 82.5 ML/MIN/1.73
GLUCOSE SERPL-MCNC: 262 MG/DL (ref 65–99)
POTASSIUM SERPL-SCNC: 3.4 MMOL/L (ref 3.5–5.2)
SODIUM SERPL-SCNC: 139 MMOL/L (ref 136–145)

## 2024-10-15 NOTE — PROGRESS NOTES
Procedure     ECG 12 Lead    Date/Time: 10/14/2024 3:24 PM  Performed by: Jewel Hope MD    Authorized by: Selin Barrett MD  Comparison: not compared with previous ECG   Previous ECG: no previous ECG available  Rhythm: sinus rhythm  Rate: normal  Conduction: conduction normal  ST Segments: ST segments normal  T Waves: T waves normal  QRS axis: normal    Clinical impression: normal ECG

## 2024-10-17 ENCOUNTER — TELEPHONE (OUTPATIENT)
Dept: FAMILY MEDICINE CLINIC | Age: 59
End: 2024-10-17
Payer: MEDICAID

## 2024-10-17 NOTE — TELEPHONE ENCOUNTER
Mary has asked me to see this patient because she is on chronic blood thinner and needs to have eye surgery and be off her 2 days for eye surgery.  She has been on this medication with her pulmonologist.  Her pulmonologist is Dr. Kaila Ware and he is no longer practicing.  She has an underlying diagnosis of malignant neoplasm of descending colon C18.6 With a prothrombin gene mutation D68.59.  I also see she has a blood clotting disorder called MTHFR mutation.  And has had pulmonary embolism on 2 different occasions so she is to be on blood thinner for life.  Stopping this medication will put her at risk for recurrent DVT or PE but I think the risk is low with her being off of it for only 2 days.  However it looks like her surgery is reconstructive anesthetic surgery so she needs to understand her potential risk of being off this medication and having another DVT or life-threatening PE.  I will need her consent to know that she is okay with the risk and understands the risk prior to signing off on her holding this medication since this is not a required surgery.  Of note, she has been off this medication for 7 days for prior surgery without any issues.  Dr. Dhaliwal

## 2024-10-21 DIAGNOSIS — K21.9 GASTROESOPHAGEAL REFLUX DISEASE WITHOUT ESOPHAGITIS: ICD-10-CM

## 2024-10-21 RX ORDER — PANTOPRAZOLE SODIUM 40 MG/1
40 TABLET, DELAYED RELEASE ORAL DAILY
Qty: 90 TABLET | Refills: 0 | Status: SHIPPED | OUTPATIENT
Start: 2024-10-21

## 2024-10-21 NOTE — TELEPHONE ENCOUNTER
Kat Dhaliwal MD4 days ago       Mary has asked me to see this patient because she is on chronic blood thinner and needs to have eye surgery and be off her 2 days for eye surgery.  She has been on this medication with her pulmonologist.  Her pulmonologist is Dr. Kaila Ware and he is no longer practicing.  She has an underlying diagnosis of malignant neoplasm of descending colon C18.6 With a prothrombin gene mutation D68.59.  I also see she has a blood clotting disorder called MTHFR mutation.  And has had pulmonary embolism on 2 different occasions so she is to be on blood thinner for life.  Stopping this medication will put her at risk for recurrent DVT or PE but I think the risk is low with her being off of it for only 2 days.  However it looks like her surgery is reconstructive anesthetic surgery so she needs to understand her potential risk of being off this medication and having another DVT or life-threatening PE.  I will need her consent to know that she is okay with the risk and understands the risk prior to signing off on her holding this medication since this is not a required surgery.  Of note, she has been off this medication for 7 days for prior surgery without any issues.  Dr. Dhaliwal

## 2024-10-21 NOTE — TELEPHONE ENCOUNTER
Called pt and read off Dr Dhaliwal's note.  She said she fully understands the risk and is wanting to proceed.   See 10/02/24 telephone note.  Same conversation.

## 2024-11-10 DIAGNOSIS — J20.9 ACUTE BRONCHITIS, UNSPECIFIED ORGANISM: ICD-10-CM

## 2024-11-11 RX ORDER — ALBUTEROL SULFATE 90 UG/1
2 AEROSOL, METERED RESPIRATORY (INHALATION) EVERY 6 HOURS PRN
Refills: 1 | OUTPATIENT
Start: 2024-11-11

## 2024-11-11 RX ORDER — FLUTICASONE PROPIONATE 50 MCG
2 SPRAY, SUSPENSION (ML) NASAL DAILY
OUTPATIENT
Start: 2024-11-11

## 2024-11-12 ENCOUNTER — OFFICE VISIT (OUTPATIENT)
Dept: FAMILY MEDICINE CLINIC | Age: 59
End: 2024-11-12
Payer: MEDICAID

## 2024-11-12 VITALS
SYSTOLIC BLOOD PRESSURE: 136 MMHG | TEMPERATURE: 98.1 F | WEIGHT: 209 LBS | DIASTOLIC BLOOD PRESSURE: 89 MMHG | HEIGHT: 65 IN | BODY MASS INDEX: 34.82 KG/M2 | HEART RATE: 65 BPM

## 2024-11-12 DIAGNOSIS — E11.9 TYPE 2 DIABETES MELLITUS WITHOUT COMPLICATION, WITHOUT LONG-TERM CURRENT USE OF INSULIN: Primary | ICD-10-CM

## 2024-11-12 DIAGNOSIS — Z12.31 SCREENING MAMMOGRAM FOR BREAST CANCER: ICD-10-CM

## 2024-11-12 DIAGNOSIS — E78.49 OTHER HYPERLIPIDEMIA: ICD-10-CM

## 2024-11-12 DIAGNOSIS — I10 ESSENTIAL HYPERTENSION: ICD-10-CM

## 2024-11-12 DIAGNOSIS — Z23 IMMUNIZATION DUE: ICD-10-CM

## 2024-11-12 DIAGNOSIS — Z00.00 ROUTINE GENERAL MEDICAL EXAMINATION AT A HEALTH CARE FACILITY: ICD-10-CM

## 2024-11-12 PROCEDURE — 99396 PREV VISIT EST AGE 40-64: CPT | Performed by: NURSE PRACTITIONER

## 2024-11-12 PROCEDURE — 1126F AMNT PAIN NOTED NONE PRSNT: CPT | Performed by: NURSE PRACTITIONER

## 2024-11-12 PROCEDURE — 90471 IMMUNIZATION ADMIN: CPT | Performed by: NURSE PRACTITIONER

## 2024-11-12 PROCEDURE — 3075F SYST BP GE 130 - 139MM HG: CPT | Performed by: NURSE PRACTITIONER

## 2024-11-12 PROCEDURE — 1160F RVW MEDS BY RX/DR IN RCRD: CPT | Performed by: NURSE PRACTITIONER

## 2024-11-12 PROCEDURE — 90656 IIV3 VACC NO PRSV 0.5 ML IM: CPT | Performed by: NURSE PRACTITIONER

## 2024-11-12 PROCEDURE — 3079F DIAST BP 80-89 MM HG: CPT | Performed by: NURSE PRACTITIONER

## 2024-11-12 PROCEDURE — 1159F MED LIST DOCD IN RCRD: CPT | Performed by: NURSE PRACTITIONER

## 2024-11-12 PROCEDURE — 3046F HEMOGLOBIN A1C LEVEL >9.0%: CPT | Performed by: NURSE PRACTITIONER

## 2024-11-12 RX ORDER — ERYTHROMYCIN 5 MG/G
OINTMENT OPHTHALMIC
COMMUNITY
Start: 2024-09-16 | End: 2024-11-12

## 2024-11-12 NOTE — PROGRESS NOTES
Chief Complaint  Annual Exam and follow up, diabetes, HTN, HLD     Subjective          Marilou M Reed presents to [facility]    History of Present Illness  General Health Questions  Regular exercise as least 3 days a week: yes   Follows a healthy diet: yes   Wears seat belt always: yes   Drinks Alcohol: rarely   Uses Recreational drugs: none   Uses tobacco products : none   UTD on Tetanus vaccine: Td 2020  Does BSE:yes   Last mammogram:12-4-2023  Last colon screen:8-3-2022, next due 2026    Diabetes:  Current medication: metformin XR takes BID   Tolerating medication: Yes  Last eye exam: [place]   Last foot exam: 4-2024  At home BS ranges: not checking   Lab Results       Component                Value               Date                       HGBA1C                   9.30 (H)            04/04/2024                Hypertension:  Current medication: losartan, HCTZ & Toprol XL   Tolerating Medication: Yes  Checking BP at home and it is: occ, runs normal  Needs refills: uses [facility] but does not need her refills today   Labs:  Lab Results       Component                Value               Date                       GLUCOSE                  262 (H)             10/14/2024                 BUN                      11                  10/14/2024                 CREATININE               0.82                10/14/2024                 EGFRIFNONA               98                  12/20/2021                 BCR                      13.4                10/14/2024                 K                        3.4 (L)             10/14/2024                 CO2                      27.3                10/14/2024                 CALCIUM                  9.8                 10/14/2024                 ALBUMIN                  4.3                 04/04/2024                 LABIL2                   1.7                 07/18/2020                 AST                      16                  04/04/2024                  ALT                      25                  2024              Hyperlipidemia  Current medication: crestor  Tolerating medication: Yes  Needs Refill: not today      Lab Results       Component                Value               Date                       CHOL                     136                 2024                 CHLPL                    228 (H)             2020                 TRIG                     106                 2024                 HDL                      62 (H)              2024                 LDL                      55                  2024                    PAST MEDICAL HISTORY changes since 5-:         Pulmonary Embolism: x 2;     Gestational Diabetes     Prothrombin gene mutation (MTHFR) one gene heterozygous/Liden 2     Colon cancer: dx'd in 2004;         GYNECOLOGICAL HISTORY:     miscarriage 1         Surgical History:       Right eye surgery 24    Hernia Repair: 2009; Ventral hernia repair;     Appendectomy: ;     Colon Resection: ; oophrectmy/salp;     colostomy reversl 2006;   Procedures: colonoscopy 13 normal     Dilation and Curettage: 17; hysterscopy;   Other Surgeries    LiF DIP  index mucous cyst excision  19;     COLONOSCOPY: was last done 8-3-22/ normal per pt ;  17 with normal results [place]         Family History:        Father:  at age 62; Cause of death was MI;  Alcoholism     Mother: lung cancer, Hypertension T2DM     Brother(s): 3 brother(s) total; 1 ;  Hypertension;  Drug Abuse ( heroin overdose )     Sister(s): Healthy; 1 sister(s) total;  Colonic Polyps;  Anxiety; Drug Abuse     Paternal Grandfather: Cause of death was MI     Paternal Grandmother: Cause of death was heart/obesity     Maternal Grandfather:  at age 60; Cause of death was MI     Maternal Grandmother:  at age 72; Cause of death was MI;  Type 2 Diabetes         Social  History:       Occupation: [employer]     Marital Status:      Children: 3 children                Past Medical History:   Diagnosis Date    Acquired coagulation factor deficiency     Acute maxillary sinusitis, unspecified     Allergic     Anxiety disorder     Arthritis     Colon cancer     dx'd in     Colon polyp     Deep vein thrombosis     Essential (primary) hypertension     Gestational diabetes     Hyperlipidemia, unspecified     Long term (current) use of anticoagulants     Low back pain     Other fatigue     Other hereditary and idiopathic neuropathies     Other specified disorders of nose and nasal sinuses     Pain in left finger(s)     Personal history of other venous thrombosis and embolism     Prothrombin gene mutation     one gene heterozygous/liden2    Pulmonary embolism     x2    Type 2 diabetes mellitus without complication     Unspecified superficial injury of right lower leg, initial encounter        Allergies   Allergen Reactions    Adhesive Tape Rash     adhesive    Copper Chloride Rash    Metal Rash        Past Surgical History:   Procedure Laterality Date    APPENDECTOMY  2005    COLON RESECTION   and 2006    oophrectmy/salp    COLONOSCOPY  2013    normal    COLONOSCOPY  2017    normal reults    COLONOSCOPY N/A 2022    repeat in 5 years    COLOSTOMY  2006    reversl    DILATION AND CURETTAGE, DIAGNOSTIC / THERAPEUTIC  2017    hysterscopy    FINGER SURGERY Left 2019    cyst removed    HERNIA REPAIR  2009    ventral hernia repair        Social History     Tobacco Use    Smoking status: Former     Current packs/day: 0.00     Average packs/day: 1 pack/day for 17.0 years (17.0 ttl pk-yrs)     Types: Cigarettes     Start date:      Quit date:      Years since quittin.8     Passive exposure: Past    Smokeless tobacco: Never   Substance Use Topics    Alcohol use: Not Currently     Comment: rarely       Family History   Problem  Relation Age of Onset    Hypertension Mother     Heart attack Father     Alcohol abuse Father     Hypertension Brother     Drug abuse Brother         heroin OD    Colonic polyp Sister     Anxiety disorder Sister     Drug abuse Sister     Heart attack Maternal Grandmother     Heart attack Maternal Grandfather     Heart defect Paternal Grandmother     Obesity Paternal Grandmother     Heart attack Paternal Grandfather         Health Maintenance Due   Topic Date Due    DIABETIC EYE EXAM  Never done    PAP SMEAR  Never done    BMI FOLLOWUP  08/29/2024    HEMOGLOBIN A1C  10/04/2024        Current Outpatient Medications on File Prior to Visit   Medication Sig    cholecalciferol (VITAMIN D3) 25 MCG (1000 UT) tablet Take 2 tablets by mouth Daily.    estradiol (ESTRACE) 0.1 MG/GM vaginal cream Insert 1 g into the vagina 2 (Two) Times a Week.    fluticasone (FLONASE) 50 MCG/ACT nasal spray SPRAY 2 SPRAYS INTO THE NOSTRIL AS DIRECTED BY PROVIDER DAILY.    hydroCHLOROthiazide 25 MG tablet Take 1 tablet by mouth Daily.    loratadine (CLARITIN) 10 MG tablet Take 1 tablet by mouth Daily.    losartan (COZAAR) 50 MG tablet Take 1 tablet by mouth Daily.    metFORMIN ER (GLUCOPHAGE-XR) 500 MG 24 hr tablet TAKE 4 TABLETS BY MOUTH DAILY    metoprolol succinate XL (TOPROL-XL) 50 MG 24 hr tablet TAKE 1 TABLET BY MOUTH EVERY DAY    nystatin (MYCOSTATIN) 487284 UNIT/GM cream Apply  topically to the appropriate area as directed.    pantoprazole (PROTONIX) 40 MG EC tablet TAKE 1 TABLET BY MOUTH EVERY DAY    rosuvastatin (CRESTOR) 10 MG tablet TAKE 1 TABLET BY MOUTH EVERY DAY    solifenacin (VESICARE) 10 MG tablet Take 1 tablet by mouth Daily.    valACYclovir (VALTREX) 1000 MG tablet TAKE 1 TABLET BY MOUTH 2 (TWO) TIMES A DAY AS NEEDED FOR COLD SORES.    Xarelto 20 MG tablet TAKE 1 TABLET BY MOUTH EVERY DAY    [DISCONTINUED] albuterol sulfate  (90 Base) MCG/ACT inhaler INHALE 2 PUFFS EVERY 6 HOURS AS NEEDED FOR WHEEZING (Patient not  taking: Reported on 11/12/2024)    [DISCONTINUED] cefdinir (OMNICEF) 300 MG capsule Take 1 capsule by mouth 2 (Two) Times a Day. (Patient not taking: Reported on 11/12/2024)    [DISCONTINUED] diclofenac (VOLTAREN) 75 MG EC tablet TAKE 1 TABLET BY MOUTH 2 (TWO) TIMES A DAY WITH MEALS. NEXT APPT DUE IN DECEMBER (Patient not taking: Reported on 11/12/2024)    [DISCONTINUED] erythromycin (ROMYCIN) 5 MG/GM ophthalmic ointment APPLY IN EYE EVERY DAY AT BEDTIME FOR 7 DAYS AFTER PROCEDURE (Patient not taking: Reported on 11/12/2024)    [DISCONTINUED] methylPREDNISolone (MEDROL) 4 MG dose pack Take as directed on package instructions, with food. (Patient not taking: Reported on 11/12/2024)     No current facility-administered medications on file prior to visit.       Immunization History   Administered Date(s) Administered    COVID-19 (MODERNA) 1st,2nd,3rd Dose Monovalent 06/17/2021, 07/19/2021    Flu Vaccine Intradermal Quad 18-64YR 01/22/2013    Fluzone  >6mos 12/09/2013, 11/12/2024    Fluzone (or Fluarix & Flulaval for VFC) >6mos 12/08/2021, 01/04/2023, 01/03/2024    Hepatitis A 11/13/2018    Influenza Seasonal Injectable 01/22/2013    Influenza, Unspecified 10/23/2020    Td (TDVAX) 12/10/2020    Tdap 06/12/2012       Review of Systems   Constitutional:  Negative for fatigue and fever.   HENT:  Negative for ear pain and sore throat.    Eyes:  Negative for blurred vision.   Respiratory:  Negative for cough and shortness of breath.    Cardiovascular:  Negative for chest pain, palpitations and leg swelling.   Gastrointestinal:  Negative for abdominal pain, constipation, diarrhea, nausea and vomiting.   Musculoskeletal:  Negative for arthralgias and myalgias.   Skin:  Negative for rash.   Neurological:  Negative for dizziness, weakness and headache.   Psychiatric/Behavioral:  Negative for sleep disturbance and depressed mood.         Objective     Vitals:    11/12/24 1452   BP: 136/89   BP Location: Right arm   Patient  "Position: Sitting   Cuff Size: Large Adult   Pulse: 65   Temp: 98.1 °F (36.7 °C)   TempSrc: Oral   Weight: 94.8 kg (209 lb)   Height: 165.1 cm (65.01\")            Physical Exam  Vitals reviewed.   Constitutional:       General: She is not in acute distress.     Appearance: Normal appearance. She is well-developed.   HENT:      Head: Normocephalic. Hair is normal.      Right Ear: Hearing, tympanic membrane, ear canal and external ear normal. No decreased hearing noted. No drainage.      Left Ear: Hearing, tympanic membrane, ear canal and external ear normal. No decreased hearing noted.      Nose: Nose normal. No nasal deformity.      Mouth/Throat:      Mouth: Mucous membranes are moist.   Eyes:      General: Lids are normal.      Extraocular Movements: Extraocular movements intact.      Conjunctiva/sclera: Conjunctivae normal.      Pupils: Pupils are equal, round, and reactive to light.   Neck:      Thyroid: No thyromegaly.      Vascular: No carotid bruit or JVD.   Cardiovascular:      Rate and Rhythm: Normal rate and regular rhythm.      Pulses: Normal pulses.      Heart sounds: Normal heart sounds. No murmur heard.     No friction rub. No gallop.   Pulmonary:      Effort: Pulmonary effort is normal. No respiratory distress.      Breath sounds: Normal breath sounds. No wheezing.   Chest:   Breasts:     Right: Normal. No mass, nipple discharge or skin change.      Left: Normal. No mass, nipple discharge or skin change.   Abdominal:      General: Bowel sounds are normal.      Palpations: Abdomen is soft. There is no mass.      Tenderness: There is no abdominal tenderness.   Musculoskeletal:         General: No tenderness or deformity. Normal range of motion.      Cervical back: Normal range of motion and neck supple.   Lymphadenopathy:      Cervical: No cervical adenopathy.      Upper Body:      Right upper body: No axillary adenopathy.      Left upper body: No axillary adenopathy.   Skin:     General: Skin is warm " and dry.      Findings: No erythema or rash.   Neurological:      Mental Status: She is alert and oriented to person, place, and time.      Motor: No abnormal muscle tone.      Gait: Gait normal.      Deep Tendon Reflexes: Reflexes are normal and symmetric.   Psychiatric:         Mood and Affect: Mood normal.         Behavior: Behavior normal.         Thought Content: Thought content normal.         Judgment: Judgment normal.         Result Review :     The following data was reviewed by: ELLI Savage on 11/12/2024:                       Assessment and Plan      Diagnoses and all orders for this visit:    1. Type 2 diabetes mellitus without complication, without long-term current use of insulin (Primary)  -     Comprehensive Metabolic Panel; Future  -     Hemoglobin A1c; Future    2. Other hyperlipidemia  Assessment & Plan:   Continue current medication and efforts with diet and exercise.       Orders:  -     Comprehensive Metabolic Panel; Future  -     Lipid Panel; Future    3. Essential hypertension  Assessment & Plan:  Hypertension is stable. Continue to monitor BP at home. Continue current meds. Continue to modify diet and lifestyle. To return fasting for labs.       Orders:  -     Comprehensive Metabolic Panel; Future    4. Screening mammogram for breast cancer  Assessment & Plan:  continue monthly BSE, setting up mammogram when due       5. Routine general medical examination at a health care facility  Assessment & Plan:  Advise regular exercise, healthy eating, always wear seat belts.   Immunizations discussed, declines update with covid, advised checking on coverage of shingrex .   To continue yearly optometry exams and to schedule her dental exam.        Orders:  -     Comprehensive Metabolic Panel; Future  -     CBC & Differential; Future  -     TSH; Future  -     Lipid Panel; Future  -     Mammo Screening Digital Tomosynthesis Bilateral With CAD; Future    6. Immunization due  Assessment &  Plan:  Will update with her flu vaccine today.     Orders:  -     Fluzone >6mos ([id number])        BMI is >= 30 and <35. (Class 1 Obesity). The following options were offered after discussion;: exercise counseling/recommendations and nutrition counseling/recommendations         Follow Up     Return for fasting for labs.    Patient was given instructions and counseling regarding her condition or for health maintenance advice. Please see specific information pulled into the AVS if appropriate.

## 2024-11-12 NOTE — ASSESSMENT & PLAN NOTE
Advise regular exercise, healthy eating, always wear seat belts.   Immunizations discussed, declines update with covid, advised checking on coverage of shingrex .   To continue yearly optometry exams and to schedule her dental exam.

## 2024-11-12 NOTE — ASSESSMENT & PLAN NOTE
Hypertension is stable. Continue to monitor BP at home. Continue current meds. Continue to modify diet and lifestyle. To return fasting for labs.

## 2024-11-21 DIAGNOSIS — Z86.711 HISTORY OF PULMONARY EMBOLUS (PE): ICD-10-CM

## 2024-11-21 DIAGNOSIS — I10 ESSENTIAL HYPERTENSION: ICD-10-CM

## 2024-11-21 DIAGNOSIS — E11.9 TYPE 2 DIABETES MELLITUS WITHOUT COMPLICATION, WITHOUT LONG-TERM CURRENT USE OF INSULIN: ICD-10-CM

## 2024-11-21 NOTE — TELEPHONE ENCOUNTER
Rx Refill Note  Requested Prescriptions     Pending Prescriptions Disp Refills    hydroCHLOROthiazide 25 MG tablet [Pharmacy Med Name: HYDROCHLOROTHIAZIDE 25 MG TAB] 90 tablet 1     Sig: TAKE 1 TABLET BY MOUTH EVERY DAY    losartan (COZAAR) 50 MG tablet [Pharmacy Med Name: LOSARTAN POTASSIUM 50 MG TAB] 90 tablet 1     Sig: TAKE 1 TABLET BY MOUTH EVERY DAY    metFORMIN ER (GLUCOPHAGE-XR) 500 MG 24 hr tablet [Pharmacy Med Name: METFORMIN HCL  MG TABLET] 270 tablet 1     Sig: TAKE 1 TABLET BY MOUTH THREE TIMES A DAY    Xarelto 20 MG tablet [Pharmacy Med Name: XARELTO 20 MG TABLET] 90 tablet 1     Sig: TAKE 1 TABLET BY MOUTH EVERY DAY      Last office visit with prescribing clinician: 11/12/2024   Last telemedicine visit with prescribing clinician: Visit date not found   Next office visit with prescribing clinician: 5/13/2025  Antihyperglycemics Protocol Failed   Diuretics Protocol Failed   ARB Protocol Failed     Melissa Nice LPN  11/21/24, 15:49 EST

## 2024-11-22 RX ORDER — LOSARTAN POTASSIUM 50 MG/1
50 TABLET ORAL DAILY
Qty: 90 TABLET | Refills: 1 | Status: SHIPPED | OUTPATIENT
Start: 2024-11-22

## 2024-11-22 RX ORDER — METFORMIN HYDROCHLORIDE 500 MG/1
500 TABLET, EXTENDED RELEASE ORAL 3 TIMES DAILY
Qty: 270 TABLET | Refills: 1 | Status: SHIPPED | OUTPATIENT
Start: 2024-11-22

## 2024-11-22 RX ORDER — HYDROCHLOROTHIAZIDE 25 MG/1
25 TABLET ORAL DAILY
Qty: 90 TABLET | Refills: 1 | Status: SHIPPED | OUTPATIENT
Start: 2024-11-22

## 2024-11-22 RX ORDER — RIVAROXABAN 20 MG/1
20 TABLET, FILM COATED ORAL DAILY
Qty: 90 TABLET | Refills: 1 | Status: SHIPPED | OUTPATIENT
Start: 2024-11-22

## 2024-11-27 ENCOUNTER — LAB (OUTPATIENT)
Dept: LAB | Facility: HOSPITAL | Age: 59
End: 2024-11-27
Payer: MEDICAID

## 2024-11-27 DIAGNOSIS — E11.9 TYPE 2 DIABETES MELLITUS WITHOUT COMPLICATION, WITHOUT LONG-TERM CURRENT USE OF INSULIN: ICD-10-CM

## 2024-11-27 DIAGNOSIS — E78.49 OTHER HYPERLIPIDEMIA: ICD-10-CM

## 2024-11-27 DIAGNOSIS — Z00.00 ROUTINE GENERAL MEDICAL EXAMINATION AT A HEALTH CARE FACILITY: ICD-10-CM

## 2024-11-27 DIAGNOSIS — I10 ESSENTIAL HYPERTENSION: ICD-10-CM

## 2024-11-27 LAB
ALBUMIN SERPL-MCNC: 4.2 G/DL (ref 3.5–5.2)
ALBUMIN/GLOB SERPL: 1.6 G/DL
ALP SERPL-CCNC: 95 U/L (ref 39–117)
ALT SERPL W P-5'-P-CCNC: 19 U/L (ref 1–33)
ANION GAP SERPL CALCULATED.3IONS-SCNC: 11 MMOL/L (ref 5–15)
AST SERPL-CCNC: 17 U/L (ref 1–32)
BASOPHILS # BLD AUTO: 0.03 10*3/MM3 (ref 0–0.2)
BASOPHILS NFR BLD AUTO: 0.4 % (ref 0–1.5)
BILIRUB SERPL-MCNC: 0.6 MG/DL (ref 0–1.2)
BUN SERPL-MCNC: 10 MG/DL (ref 6–20)
BUN/CREAT SERPL: 13.7 (ref 7–25)
CALCIUM SPEC-SCNC: 9.6 MG/DL (ref 8.6–10.5)
CHLORIDE SERPL-SCNC: 102 MMOL/L (ref 98–107)
CHOLEST SERPL-MCNC: 130 MG/DL (ref 0–200)
CO2 SERPL-SCNC: 26 MMOL/L (ref 22–29)
CREAT SERPL-MCNC: 0.73 MG/DL (ref 0.57–1)
DEPRECATED RDW RBC AUTO: 40 FL (ref 37–54)
EGFRCR SERPLBLD CKD-EPI 2021: 94.9 ML/MIN/1.73
EOSINOPHIL # BLD AUTO: 0.26 10*3/MM3 (ref 0–0.4)
EOSINOPHIL NFR BLD AUTO: 3.8 % (ref 0.3–6.2)
ERYTHROCYTE [DISTWIDTH] IN BLOOD BY AUTOMATED COUNT: 12 % (ref 12.3–15.4)
GLOBULIN UR ELPH-MCNC: 2.6 GM/DL
GLUCOSE SERPL-MCNC: 160 MG/DL (ref 65–99)
HBA1C MFR BLD: 9.5 % (ref 4.8–5.6)
HCT VFR BLD AUTO: 41.5 % (ref 34–46.6)
HDLC SERPL-MCNC: 54 MG/DL (ref 40–60)
HGB BLD-MCNC: 13.9 G/DL (ref 12–15.9)
IMM GRANULOCYTES # BLD AUTO: 0.01 10*3/MM3 (ref 0–0.05)
IMM GRANULOCYTES NFR BLD AUTO: 0.1 % (ref 0–0.5)
LDLC SERPL CALC-MCNC: 60 MG/DL (ref 0–100)
LDLC/HDLC SERPL: 1.11 {RATIO}
LYMPHOCYTES # BLD AUTO: 2.65 10*3/MM3 (ref 0.7–3.1)
LYMPHOCYTES NFR BLD AUTO: 38.9 % (ref 19.6–45.3)
MCH RBC QN AUTO: 30.3 PG (ref 26.6–33)
MCHC RBC AUTO-ENTMCNC: 33.5 G/DL (ref 31.5–35.7)
MCV RBC AUTO: 90.6 FL (ref 79–97)
MONOCYTES # BLD AUTO: 0.32 10*3/MM3 (ref 0.1–0.9)
MONOCYTES NFR BLD AUTO: 4.7 % (ref 5–12)
NEUTROPHILS NFR BLD AUTO: 3.54 10*3/MM3 (ref 1.7–7)
NEUTROPHILS NFR BLD AUTO: 52.1 % (ref 42.7–76)
PLATELET # BLD AUTO: 354 10*3/MM3 (ref 140–450)
PMV BLD AUTO: 9.1 FL (ref 6–12)
POTASSIUM SERPL-SCNC: 3.8 MMOL/L (ref 3.5–5.2)
PROT SERPL-MCNC: 6.8 G/DL (ref 6–8.5)
RBC # BLD AUTO: 4.58 10*6/MM3 (ref 3.77–5.28)
SODIUM SERPL-SCNC: 139 MMOL/L (ref 136–145)
TRIGL SERPL-MCNC: 80 MG/DL (ref 0–150)
TSH SERPL DL<=0.05 MIU/L-ACNC: 2.28 UIU/ML (ref 0.27–4.2)
VLDLC SERPL-MCNC: 16 MG/DL (ref 5–40)
WBC NRBC COR # BLD AUTO: 6.81 10*3/MM3 (ref 3.4–10.8)

## 2024-11-27 PROCEDURE — 36415 COLL VENOUS BLD VENIPUNCTURE: CPT

## 2024-11-27 PROCEDURE — 80050 GENERAL HEALTH PANEL: CPT

## 2024-11-27 PROCEDURE — 83036 HEMOGLOBIN GLYCOSYLATED A1C: CPT

## 2024-11-27 PROCEDURE — 80061 LIPID PANEL: CPT

## 2024-12-13 ENCOUNTER — TELEPHONE (OUTPATIENT)
Dept: FAMILY MEDICINE CLINIC | Age: 59
End: 2024-12-13
Payer: MEDICAID

## 2024-12-13 DIAGNOSIS — E11.9 TYPE 2 DIABETES MELLITUS WITHOUT COMPLICATION, WITHOUT LONG-TERM CURRENT USE OF INSULIN: Primary | ICD-10-CM

## 2024-12-13 RX ORDER — FLUTICASONE PROPIONATE 50 MCG
2 SPRAY, SUSPENSION (ML) NASAL DAILY
Qty: 15.8 G | Refills: 2 | Status: SHIPPED | OUTPATIENT
Start: 2024-12-13

## 2024-12-13 RX ORDER — BLOOD SUGAR DIAGNOSTIC
STRIP MISCELLANEOUS
Qty: 100 EACH | Refills: 3 | Status: SHIPPED | OUTPATIENT
Start: 2024-12-13

## 2024-12-13 NOTE — TELEPHONE ENCOUNTER
Sent [employer] message and requested a refill of her Accu- Chek test strips. She said she checks her blood sugar 3-4 times a week.   Need to know which accu-chek: avivia plus, guide..

## 2024-12-22 DIAGNOSIS — E78.49 OTHER HYPERLIPIDEMIA: ICD-10-CM

## 2024-12-22 DIAGNOSIS — I10 ESSENTIAL HYPERTENSION: ICD-10-CM

## 2024-12-23 RX ORDER — METOPROLOL SUCCINATE 50 MG/1
50 TABLET, EXTENDED RELEASE ORAL DAILY
Qty: 90 TABLET | Refills: 0 | Status: SHIPPED | OUTPATIENT
Start: 2024-12-23

## 2024-12-23 RX ORDER — ROSUVASTATIN CALCIUM 10 MG/1
10 TABLET, COATED ORAL DAILY
Qty: 90 TABLET | Refills: 0 | Status: SHIPPED | OUTPATIENT
Start: 2024-12-23

## 2025-01-07 ENCOUNTER — TELEPHONE (OUTPATIENT)
Dept: FAMILY MEDICINE CLINIC | Age: 60
End: 2025-01-07
Payer: MEDICAID

## 2025-01-07 NOTE — TELEPHONE ENCOUNTER
I consulted with Dr Dhaliwal late last year on 10-17-24 re holding rx:     Mary has asked me to see this patient because she is on chronic blood thinner and needs to have eye surgery and be off her 2 days for eye surgery.  She has been on this medication with her pulmonologist.  Her pulmonologist is Dr. Kaila Ware and he is no longer practicing.  She has an underlying diagnosis of malignant neoplasm of descending colon C18.6 With a prothrombin gene mutation D68.59.  I also see she has a blood clotting disorder called MTHFR mutation.  And has had pulmonary embolism on 2 different occasions so she is to be on blood thinner for life.  Stopping this medication will put her at risk for recurrent DVT or PE but I think the risk is low with her being off of it for only 2 days.  However it looks like her surgery is reconstructive anesthetic surgery so she needs to understand her potential risk of being off this medication and having another DVT or life-threatening PE.  I will need her consent to know that she is okay with the risk and understands the risk prior to signing off on her holding this medication since this is not a required surgery.  Of note, she has been off this medication for 7 days for prior surgery without any issues.  Dr. Dhaliwal     See what happened re her surgery, she is contacting me and needs a note for clearance to hold.

## 2025-01-08 NOTE — TELEPHONE ENCOUNTER
Pt said this is to be done by the same provider just a different surgical center.  The letter from Dr Dhaliwal from October needs to be updated.  The clearance form from [facility] ask for approval to hold the xarelto and diclofenac for 14 days prior to surgery.  Pt said no that's not correct it should be hold the xarelto 2 days prior and diclofenac 14 days prior.  She is going to call that office and have them correct the form and resend it.

## 2025-01-16 DIAGNOSIS — K21.9 GASTROESOPHAGEAL REFLUX DISEASE WITHOUT ESOPHAGITIS: ICD-10-CM

## 2025-01-16 RX ORDER — PANTOPRAZOLE SODIUM 40 MG/1
40 TABLET, DELAYED RELEASE ORAL DAILY
Qty: 90 TABLET | Refills: 0 | Status: SHIPPED | OUTPATIENT
Start: 2025-01-16

## 2025-01-21 ENCOUNTER — OFFICE VISIT (OUTPATIENT)
Dept: FAMILY MEDICINE CLINIC | Age: 60
End: 2025-01-21
Payer: MEDICAID

## 2025-01-21 VITALS
TEMPERATURE: 98 F | HEART RATE: 90 BPM | BODY MASS INDEX: 34.22 KG/M2 | OXYGEN SATURATION: 99 % | WEIGHT: 205.4 LBS | HEIGHT: 65 IN | SYSTOLIC BLOOD PRESSURE: 168 MMHG | DIASTOLIC BLOOD PRESSURE: 74 MMHG

## 2025-01-21 DIAGNOSIS — I10 ESSENTIAL HYPERTENSION: ICD-10-CM

## 2025-01-21 DIAGNOSIS — M79.601 RIGHT ARM PAIN: ICD-10-CM

## 2025-01-21 DIAGNOSIS — E11.9 TYPE 2 DIABETES MELLITUS WITHOUT COMPLICATION, WITHOUT LONG-TERM CURRENT USE OF INSULIN: Primary | ICD-10-CM

## 2025-01-21 PROCEDURE — 99214 OFFICE O/P EST MOD 30 MIN: CPT | Performed by: NURSE PRACTITIONER

## 2025-01-21 PROCEDURE — 3077F SYST BP >= 140 MM HG: CPT | Performed by: NURSE PRACTITIONER

## 2025-01-21 PROCEDURE — 3078F DIAST BP <80 MM HG: CPT | Performed by: NURSE PRACTITIONER

## 2025-01-21 PROCEDURE — 1126F AMNT PAIN NOTED NONE PRSNT: CPT | Performed by: NURSE PRACTITIONER

## 2025-01-21 RX ORDER — GLIMEPIRIDE 2 MG/1
2 TABLET ORAL
Qty: 30 TABLET | Refills: 2 | Status: SHIPPED | OUTPATIENT
Start: 2025-01-21

## 2025-01-21 NOTE — ASSESSMENT & PLAN NOTE
Repeat bp high, to Continue to monitor BP at home and if not improving, follow up. Continue current meds. Continue to modify diet and lifestyle.

## 2025-01-21 NOTE — PROGRESS NOTES
Chief Complaint  Labs Only (Discuss lab results. Blood sugar elevated. )    Subjective          Marilou Edwards presents to [facility]    History of Present Illness  Diabetes:  Current medication: metformin 2000 mg daily   Tolerating medication: Yes  Last eye exam: UTD, saw Brelage in the last six months   Last foot exam:   At home BS ranges: avg 180's to highest is over 200  Lab Results       Component                Value               Date                       HGBA1C                   9.50 (H)            2024              Hypertension:  Current medication: losartan, HCTZ and toprol   Tolerating Medication: Yes  Checking BP at home and it is: 120's/70's   Needs refills: No  Labs:  Lab Results       Component                Value               Date                       GLUCOSE                  160 (H)             2024                 BUN                      10                  2024                 CREATININE               0.73                2024                 EGFRIFNONA               98                  2021                 BCR                      13.7                2024                 K                        3.8                 2024                 CO2                      26.0                2024                 CALCIUM                  9.6                 2024                 ALBUMIN                  4.2                 2024                 LABIL2                   1.7                 2020                 AST                      17                  2024                 ALT                      19                  2024                  PAST MEDICAL HISTORY changes since 2024:         Pulmonary Embolism: x 2;     Gestational Diabetes     Prothrombin gene mutation (MTHFR) one gene heterozygous/Liden 2     Colon cancer: dx'd in 2004;         GYNECOLOGICAL HISTORY:      miscarriage 1         Surgical History:      Eye surgery 25  Right eye surgery 24    Hernia Repair: 2009; Ventral hernia repair;     Appendectomy: ;     Colon Resection: ; oophrectmy/salp;     colostomy reversl fall ;   Procedures: colonoscopy 13 normal     Dilation and Curettage: 17; hysterscopy;   Other Surgeries    LiF DIP  index mucous cyst excision  19;     COLONOSCOPY: was last done 8-3-22/ normal per pt ;  17 with normal results [place]         Family History:        Father:  at age 62; Cause of death was MI;  Alcoholism     Mother: lung cancer, Hypertension T2DM     Brother(s): 3 brother(s) total; 1 ;  Hypertension;  Drug Abuse ( heroin overdose )     Sister(s): Healthy; 1 sister(s) total;  Colonic Polyps;  Anxiety; Drug Abuse     Paternal Grandfather: Cause of death was MI     Paternal Grandmother: Cause of death was heart/obesity     Maternal Grandfather:  at age 60; Cause of death was MI     Maternal Grandmother:  at age 72; Cause of death was MI;  Type 2 Diabetes         Social History:       Occupation: [employer]     Marital Status:      Children: 3 children          Diabetes  She has type 2 diabetes mellitus. No MedicAlert identification noted. The initial diagnosis of diabetes was made 15 years ago. Pertinent negatives for hypoglycemia include no confusion, headaches, speech difficulty, sweats or tremors. Pertinent negatives for diabetes include no blurred vision, no foot paresthesias, no foot ulcerations, no polydipsia, no polyuria and no weight loss. Symptoms are worsening. She is compliant with treatment most of the time. She is following a generally healthy diet. Meal planning includes avoidance of concentrated sweets. She participates in exercise weekly. She monitors blood glucose at home 3-4 x per week. Her highest blood glucose is >200 mg/dl. She does not see a podiatrist. Eye exam is current.          Past Medical History:   Diagnosis Date    Acquired coagulation factor deficiency     Acute maxillary sinusitis, unspecified     Allergic     Anxiety disorder     Arthritis     Colon cancer     dx'd in     Colon polyp     Deep vein thrombosis     Essential (primary) hypertension     Gestational diabetes     Hyperlipidemia, unspecified     Long term (current) use of anticoagulants     Low back pain     Other fatigue     Other hereditary and idiopathic neuropathies     Other specified disorders of nose and nasal sinuses     Pain in left finger(s)     Personal history of other venous thrombosis and embolism     Prothrombin gene mutation     one gene heterozygous/liden2    Pulmonary embolism     x2    Type 2 diabetes mellitus without complication     Unspecified superficial injury of right lower leg, initial encounter        Allergies   Allergen Reactions    Adhesive Tape Rash     adhesive    Copper Chloride Rash    Metal Rash        Past Surgical History:   Procedure Laterality Date    APPENDECTOMY  2005    COLON RESECTION  2005 and 2006    oophrectmy/salp    COLONOSCOPY  2013    normal    COLONOSCOPY  2017    normal reults    COLONOSCOPY N/A 2022    repeat in 5 years    COLOSTOMY  2006    reversl    DILATION AND CURETTAGE, DIAGNOSTIC / THERAPEUTIC  2017    hysterscopy    FINGER SURGERY Left 2019    cyst removed    HERNIA REPAIR  2009    ventral hernia repair        Social History     Tobacco Use    Smoking status: Former     Current packs/day: 0.00     Average packs/day: 1 pack/day for 17.0 years (17.0 ttl pk-yrs)     Types: Cigarettes     Start date:      Quit date:      Years since quittin.0     Passive exposure: Past    Smokeless tobacco: Never   Substance Use Topics    Alcohol use: Not Currently     Comment: rarely       Family History   Problem Relation Age of Onset    Hypertension Mother     Heart attack Father     Alcohol abuse Father     Hypertension  Brother     Drug abuse Brother         heroin OD    Colonic polyp Sister     Anxiety disorder Sister     Drug abuse Sister     Heart attack Maternal Grandmother     Heart attack Maternal Grandfather     Heart defect Paternal Grandmother     Obesity Paternal Grandmother     Heart attack Paternal Grandfather         Health Maintenance Due   Topic Date Due    DIABETIC EYE EXAM  Never done    ZOSTER VACCINE (1 of 2) Never done    PAP SMEAR  Never done    COVID-19 Vaccine (3 - 2024-25 season) 09/01/2024        Current Outpatient Medications on File Prior to Visit   Medication Sig    cholecalciferol (VITAMIN D3) 25 MCG (1000 UT) tablet Take 2 tablets by mouth Daily.    estradiol (ESTRACE) 0.1 MG/GM vaginal cream Insert 1 g into the vagina 2 (Two) Times a Week.    fluticasone (FLONASE) 50 MCG/ACT nasal spray SPRAY 2 SPRAYS INTO THE NOSTRIL AS DIRECTED BY PROVIDER DAILY.    glucose blood (Accu-Chek Guide Test) test strip Check blood sugar daily.    hydroCHLOROthiazide 25 MG tablet TAKE 1 TABLET BY MOUTH EVERY DAY    loratadine (CLARITIN) 10 MG tablet Take 1 tablet by mouth Daily.    losartan (COZAAR) 50 MG tablet TAKE 1 TABLET BY MOUTH EVERY DAY    metFORMIN ER (GLUCOPHAGE-XR) 500 MG 24 hr tablet TAKE 1 TABLET BY MOUTH THREE TIMES A DAY    metoprolol succinate XL (TOPROL-XL) 50 MG 24 hr tablet TAKE 1 TABLET BY MOUTH EVERY DAY    nystatin (MYCOSTATIN) 483077 UNIT/GM cream Apply  topically to the appropriate area as directed.    pantoprazole (PROTONIX) 40 MG EC tablet TAKE 1 TABLET BY MOUTH EVERY DAY    rosuvastatin (CRESTOR) 10 MG tablet TAKE 1 TABLET BY MOUTH EVERY DAY    solifenacin (VESICARE) 10 MG tablet Take 1 tablet by mouth Daily.    valACYclovir (VALTREX) 1000 MG tablet TAKE 1 TABLET BY MOUTH 2 (TWO) TIMES A DAY AS NEEDED FOR COLD SORES.    Xarelto 20 MG tablet TAKE 1 TABLET BY MOUTH EVERY DAY     No current facility-administered medications on file prior to visit.       Immunization History   Administered Date(s)  "Administered    COVID-19 (MODERNA) 1st,2nd,3rd Dose Monovalent 06/17/2021, 07/19/2021    Flu Vaccine Intradermal Quad 18-64YR 01/22/2013    Fluzone  >6mos 12/09/2013, 11/12/2024    Fluzone (or Fluarix & Flulaval for VFC) >6mos 12/08/2021, 01/04/2023, 01/03/2024    Hepatitis A 11/13/2018    Influenza Seasonal Injectable 01/22/2013    Influenza, Unspecified 10/23/2020    Td (TDVAX) 12/10/2020    Tdap 06/12/2012       Review of Systems   Constitutional:  Negative for unexpected weight loss.   Eyes:  Negative for blurred vision.   Endocrine: Negative for polydipsia and polyuria.   Musculoskeletal:  Positive for arthralgias (R arm, worse with movement, does repeative work, OTC treatment has helped).   Neurological:  Negative for tremors, speech difficulty and confusion.   Psychiatric/Behavioral:  Positive for stress (related to her  health and her mom).         Objective     Vitals:    01/21/25 1345 01/21/25 1422   BP: 156/80 168/74   BP Location: Left arm Left arm   Patient Position: Sitting Sitting   Cuff Size: Large Adult Large Adult   Pulse: 94 90   Temp: 98 °F (36.7 °C)    TempSrc: Oral    SpO2: 99%    Weight: 93.2 kg (205 lb 6.4 oz)    Height: 165.1 cm (65.01\")             Physical Exam  Vitals reviewed.   Constitutional:       General: She is not in acute distress.     Appearance: Normal appearance.   Neck:      Vascular: No carotid bruit.   Cardiovascular:      Rate and Rhythm: Normal rate and regular rhythm.      Heart sounds: Normal heart sounds. No murmur heard.  Pulmonary:      Effort: Pulmonary effort is normal. No respiratory distress.      Breath sounds: Normal breath sounds.   Musculoskeletal:         General: No tenderness (to right arm, full ROM with out pain).      Right lower leg: No edema.      Left lower leg: No edema.   Neurological:      Mental Status: She is alert.   Psychiatric:         Mood and Affect: Mood normal.         Behavior: Behavior normal.         Result Review :     The " following data was reviewed by: ELLI Savage on 01/21/2025:                       Assessment and Plan      Diagnoses and all orders for this visit:    1. Type 2 diabetes mellitus without complication, without long-term current use of insulin (Primary)  Assessment & Plan:  Reviewed recent labs, continue metformin, add amaryl, work on diet, exercise and continue to monitor her sugars, repeat her A1C in 3 months     Orders:  -     glimepiride (Amaryl) 2 MG tablet; Take 1 tablet by mouth Every Morning Before Breakfast.  Dispense: 30 tablet; Refill: 2  -     Comprehensive metabolic panel; Future  -     Hemoglobin A1c; Future    2. Essential hypertension  Assessment & Plan:  Repeat bp high, to Continue to monitor BP at home and if not improving, follow up. Continue current meds. Continue to modify diet and lifestyle.     Orders:  -     Comprehensive metabolic panel; Future    3. Right arm pain  Assessment & Plan:  continue OTC treatment and if not improving, follow up                   I spent > 30  minutes caring for Marilou on this date of service. This time includes time spent by me in the following activities:preparing for the visit, reviewing tests, obtaining and/or reviewing a separately obtained history, performing a medically appropriate examination and/or evaluation , counseling and educating the patient/family/caregiver, ordering medications, tests, or procedures, and documenting information in the medical record    Follow Up     Return if symptoms worsen or fail to improve, for follow up in 3 months for labs .    Patient was given instructions and counseling regarding her condition or for health maintenance advice. Please see specific information pulled into the AVS if appropriate.       Answers submitted by the patient for this visit:  Primary Reason for Visit (Submitted on 1/7/2025)  What is the primary reason for your visit?: Diabetes  Diabetes Questionnaire (Submitted on 1/7/2025)  Chief  Complaint: Diabetes problem  Below 70: never

## 2025-01-21 NOTE — ASSESSMENT & PLAN NOTE
Reviewed recent labs, continue metformin, add amaryl, work on diet, exercise and continue to monitor her sugars, repeat her A1C in 3 months

## 2025-01-23 ENCOUNTER — TELEPHONE (OUTPATIENT)
Dept: FAMILY MEDICINE CLINIC | Age: 60
End: 2025-01-23
Payer: MEDICAID

## 2025-01-23 DIAGNOSIS — E11.9 TYPE 2 DIABETES MELLITUS WITHOUT COMPLICATION, WITHOUT LONG-TERM CURRENT USE OF INSULIN: Primary | ICD-10-CM

## 2025-01-23 NOTE — TELEPHONE ENCOUNTER
I tried to stop her strips and send one touch ultra test strips to test up to TID 90 day supply with 3 refills to [facility] but said I had to contact pharmacy to discontinue the ones she has been using.  Can you take care of this refill and if I need to sign I can.

## 2025-01-23 NOTE — TELEPHONE ENCOUNTER
Mary Morales NP said she needs the one touch ultra meter not test strips, she has the strips but no meter

## 2025-01-27 DIAGNOSIS — I10 ESSENTIAL HYPERTENSION: ICD-10-CM

## 2025-01-28 RX ORDER — METOPROLOL SUCCINATE 50 MG/1
50 TABLET, EXTENDED RELEASE ORAL DAILY
Qty: 90 TABLET | Refills: 1 | Status: SHIPPED | OUTPATIENT
Start: 2025-01-28

## 2025-02-07 ENCOUNTER — HOSPITAL ENCOUNTER (OUTPATIENT)
Dept: MAMMOGRAPHY | Facility: HOSPITAL | Age: 60
Discharge: HOME | End: 2025-02-07
Admitting: NURSE PRACTITIONER
Payer: MEDICAID

## 2025-02-07 DIAGNOSIS — Z00.00 ROUTINE GENERAL MEDICAL EXAMINATION AT A HEALTH CARE FACILITY: ICD-10-CM

## 2025-02-07 PROCEDURE — 77067 SCR MAMMO BI INCL CAD: CPT

## 2025-02-07 PROCEDURE — 77063 BREAST TOMOSYNTHESIS BI: CPT

## 2025-02-18 RX ORDER — FLUTICASONE PROPIONATE 50 MCG
2 SPRAY, SUSPENSION (ML) NASAL DAILY
Qty: 15.8 G | Refills: 2 | Status: SHIPPED | OUTPATIENT
Start: 2025-02-18

## 2025-02-18 NOTE — TELEPHONE ENCOUNTER
Rx Refill Note  Requested Prescriptions     Pending Prescriptions Disp Refills    fluticasone (FLONASE) 50 MCG/ACT nasal spray 15.8 g 2     Sig: Administer 2 sprays into the nostril(s) as directed by provider Daily. As directed by the provider      Last office visit with prescribing clinician: 1/21/2025   Last telemedicine visit with prescribing clinician: Visit date not found   Next office visit with prescribing clinician: 5/13/2025      Melissa Nice LPN  02/18/25, 11:32 EST

## 2025-02-27 DIAGNOSIS — E11.9 TYPE 2 DIABETES MELLITUS WITHOUT COMPLICATION, WITHOUT LONG-TERM CURRENT USE OF INSULIN: ICD-10-CM

## 2025-02-27 DIAGNOSIS — J20.9 ACUTE BRONCHITIS, UNSPECIFIED ORGANISM: ICD-10-CM

## 2025-02-28 RX ORDER — GLIMEPIRIDE 2 MG/1
2 TABLET ORAL
Qty: 90 TABLET | OUTPATIENT
Start: 2025-02-28

## 2025-02-28 RX ORDER — ALBUTEROL SULFATE 90 UG/1
2 AEROSOL, METERED RESPIRATORY (INHALATION) EVERY 6 HOURS PRN
Refills: 1 | OUTPATIENT
Start: 2025-02-28

## 2025-02-28 RX ORDER — METFORMIN HYDROCHLORIDE 500 MG/1
2000 TABLET, EXTENDED RELEASE ORAL DAILY
Qty: 360 TABLET | OUTPATIENT
Start: 2025-02-28

## 2025-02-28 NOTE — TELEPHONE ENCOUNTER
Rx Refill Note  Requested Prescriptions     Pending Prescriptions Disp Refills    Ventolin  (90 Base) MCG/ACT inhaler [Pharmacy Med Name: VENTOLIN HFA 90 MCG INHALER]  1     Sig: TAKE 2 PUFFS BY MOUTH EVERY 6 HOURS AS NEEDED FOR WHEEZE    metFORMIN ER (GLUCOPHAGE-XR) 500 MG 24 hr tablet [Pharmacy Med Name: METFORMIN HCL  MG TABLET] 360 tablet      Sig: TAKE 4 TABLETS BY MOUTH DAILY    glimepiride (AMARYL) 2 MG tablet [Pharmacy Med Name: GLIMEPIRIDE 2 MG TABLET] 90 tablet      Sig: TAKE 1 TABLET BY MOUTH EVERY DAY BEFORE BREAKFAST      Last office visit with prescribing clinician: 1/21/2025     Next office visit with prescribing clinician: 5/13/2025      Denied, inhaler not on list and stated discontinue. Metformin and glimepiride last fill 1/21/25 90 day 1 KLEBER Persaud LPN  02/28/25, 08:22 EST

## 2025-03-20 DIAGNOSIS — E78.49 OTHER HYPERLIPIDEMIA: ICD-10-CM

## 2025-03-20 RX ORDER — ROSUVASTATIN CALCIUM 10 MG/1
10 TABLET, COATED ORAL DAILY
Qty: 90 TABLET | Refills: 0 | Status: SHIPPED | OUTPATIENT
Start: 2025-03-20

## 2025-04-13 DIAGNOSIS — K21.9 GASTROESOPHAGEAL REFLUX DISEASE WITHOUT ESOPHAGITIS: ICD-10-CM

## 2025-04-14 RX ORDER — PANTOPRAZOLE SODIUM 40 MG/1
40 TABLET, DELAYED RELEASE ORAL DAILY
Qty: 90 TABLET | Refills: 0 | Status: SHIPPED | OUTPATIENT
Start: 2025-04-14

## 2025-04-24 DIAGNOSIS — E11.9 TYPE 2 DIABETES MELLITUS WITHOUT COMPLICATION, WITHOUT LONG-TERM CURRENT USE OF INSULIN: ICD-10-CM

## 2025-04-24 RX ORDER — GLIMEPIRIDE 2 MG/1
2 TABLET ORAL
Qty: 90 TABLET | Refills: 0 | Status: SHIPPED | OUTPATIENT
Start: 2025-04-24

## 2025-04-24 NOTE — TELEPHONE ENCOUNTER
Rx Refill Note  Requested Prescriptions     Pending Prescriptions Disp Refills    glimepiride (Amaryl) 2 MG tablet 30 tablet 2     Sig: Take 1 tablet by mouth Every Morning Before Breakfast.      Last office visit with prescribing clinician: 1/21/2025   Last telemedicine visit with prescribing clinician: Visit date not found   Next office visit with prescribing clinician: 5/13/2025      Melissa Nice LPN  04/24/25, 08:44 EDT

## 2025-05-06 ENCOUNTER — TELEPHONE (OUTPATIENT)
Dept: FAMILY MEDICINE CLINIC | Age: 60
End: 2025-05-06
Payer: MEDICAID

## 2025-05-12 ENCOUNTER — LAB (OUTPATIENT)
Dept: LAB | Facility: HOSPITAL | Age: 60
End: 2025-05-12
Payer: MEDICAID

## 2025-05-12 DIAGNOSIS — E11.9 TYPE 2 DIABETES MELLITUS WITHOUT COMPLICATION, WITHOUT LONG-TERM CURRENT USE OF INSULIN: ICD-10-CM

## 2025-05-12 DIAGNOSIS — E78.49 OTHER HYPERLIPIDEMIA: ICD-10-CM

## 2025-05-12 DIAGNOSIS — I10 ESSENTIAL HYPERTENSION: ICD-10-CM

## 2025-05-12 PROBLEM — R05.1 ACUTE COUGH: Status: RESOLVED | Noted: 2023-04-05 | Resolved: 2025-05-12

## 2025-05-12 PROBLEM — J20.9 ACUTE BRONCHITIS: Status: RESOLVED | Noted: 2024-05-15 | Resolved: 2025-05-12

## 2025-05-12 PROBLEM — J40 BRONCHITIS: Status: RESOLVED | Noted: 2024-03-29 | Resolved: 2025-05-12

## 2025-05-12 LAB
ALBUMIN SERPL-MCNC: 4.2 G/DL (ref 3.5–5.2)
ALBUMIN/GLOB SERPL: 1.7 G/DL
ALP SERPL-CCNC: 91 U/L (ref 39–117)
ALT SERPL W P-5'-P-CCNC: 15 U/L (ref 1–33)
ANION GAP SERPL CALCULATED.3IONS-SCNC: 12.2 MMOL/L (ref 5–15)
AST SERPL-CCNC: 17 U/L (ref 1–32)
BILIRUB SERPL-MCNC: 0.3 MG/DL (ref 0–1.2)
BUN SERPL-MCNC: 9 MG/DL (ref 6–20)
BUN/CREAT SERPL: 13.2 (ref 7–25)
CALCIUM SPEC-SCNC: 9.4 MG/DL (ref 8.6–10.5)
CHLORIDE SERPL-SCNC: 102 MMOL/L (ref 98–107)
CO2 SERPL-SCNC: 26.8 MMOL/L (ref 22–29)
CREAT SERPL-MCNC: 0.68 MG/DL (ref 0.57–1)
EGFRCR SERPLBLD CKD-EPI 2021: 100.5 ML/MIN/1.73
GLOBULIN UR ELPH-MCNC: 2.5 GM/DL
GLUCOSE SERPL-MCNC: 152 MG/DL (ref 65–99)
HBA1C MFR BLD: 7.3 % (ref 4.8–5.6)
POTASSIUM SERPL-SCNC: 3.9 MMOL/L (ref 3.5–5.2)
PROT SERPL-MCNC: 6.7 G/DL (ref 6–8.5)
SODIUM SERPL-SCNC: 141 MMOL/L (ref 136–145)

## 2025-05-12 PROCEDURE — 80061 LIPID PANEL: CPT

## 2025-05-12 PROCEDURE — 36415 COLL VENOUS BLD VENIPUNCTURE: CPT

## 2025-05-12 PROCEDURE — 83036 HEMOGLOBIN GLYCOSYLATED A1C: CPT

## 2025-05-12 PROCEDURE — 80053 COMPREHEN METABOLIC PANEL: CPT

## 2025-05-13 ENCOUNTER — OFFICE VISIT (OUTPATIENT)
Dept: FAMILY MEDICINE CLINIC | Age: 60
End: 2025-05-13
Payer: MEDICAID

## 2025-05-13 ENCOUNTER — RESULTS FOLLOW-UP (OUTPATIENT)
Dept: FAMILY MEDICINE CLINIC | Age: 60
End: 2025-05-13

## 2025-05-13 VITALS
TEMPERATURE: 98.6 F | OXYGEN SATURATION: 97 % | DIASTOLIC BLOOD PRESSURE: 84 MMHG | WEIGHT: 213.2 LBS | BODY MASS INDEX: 35.52 KG/M2 | SYSTOLIC BLOOD PRESSURE: 141 MMHG | HEART RATE: 80 BPM | HEIGHT: 65 IN

## 2025-05-13 DIAGNOSIS — I10 ESSENTIAL HYPERTENSION: ICD-10-CM

## 2025-05-13 DIAGNOSIS — Z86.711 HISTORY OF PULMONARY EMBOLUS (PE): ICD-10-CM

## 2025-05-13 DIAGNOSIS — E78.49 OTHER HYPERLIPIDEMIA: Primary | ICD-10-CM

## 2025-05-13 DIAGNOSIS — E11.9 TYPE 2 DIABETES MELLITUS WITHOUT COMPLICATION, WITHOUT LONG-TERM CURRENT USE OF INSULIN: ICD-10-CM

## 2025-05-13 DIAGNOSIS — Z85.038 HISTORY OF COLON CANCER: ICD-10-CM

## 2025-05-13 LAB
ALBUMIN UR-MCNC: <1.2 MG/DL
CHOLEST SERPL-MCNC: 157 MG/DL (ref 0–200)
CREAT UR-MCNC: 64.5 MG/DL
HDLC SERPL-MCNC: 66 MG/DL (ref 40–60)
LDLC SERPL CALC-MCNC: 77 MG/DL (ref 0–100)
LDLC/HDLC SERPL: 1.17 {RATIO}
MICROALBUMIN/CREAT UR: NORMAL MG/G{CREAT}
TRIGL SERPL-MCNC: 70 MG/DL (ref 0–150)
VLDLC SERPL-MCNC: 14 MG/DL (ref 5–40)

## 2025-05-13 PROCEDURE — 82570 ASSAY OF URINE CREATININE: CPT | Performed by: NURSE PRACTITIONER

## 2025-05-13 PROCEDURE — 82043 UR ALBUMIN QUANTITATIVE: CPT | Performed by: NURSE PRACTITIONER

## 2025-05-13 RX ORDER — SOLIFENACIN SUCCINATE 10 MG/1
1 TABLET, FILM COATED ORAL DAILY
COMMUNITY
Start: 2025-03-02 | End: 2025-05-13

## 2025-05-13 RX ORDER — GLIMEPIRIDE 2 MG/1
2 TABLET ORAL
Qty: 90 TABLET | Refills: 1 | Status: SHIPPED | OUTPATIENT
Start: 2025-05-13

## 2025-05-13 RX ORDER — ROSUVASTATIN CALCIUM 10 MG/1
10 TABLET, COATED ORAL DAILY
Qty: 90 TABLET | Refills: 1 | Status: SHIPPED | OUTPATIENT
Start: 2025-05-13

## 2025-05-13 RX ORDER — LOSARTAN POTASSIUM 50 MG/1
50 TABLET ORAL DAILY
Qty: 90 TABLET | Refills: 1 | Status: SHIPPED | OUTPATIENT
Start: 2025-05-13

## 2025-05-13 RX ORDER — METOPROLOL SUCCINATE 50 MG/1
50 TABLET, EXTENDED RELEASE ORAL DAILY
Qty: 90 TABLET | Refills: 1 | Status: SHIPPED | OUTPATIENT
Start: 2025-05-13

## 2025-05-13 RX ORDER — HYDROCHLOROTHIAZIDE 25 MG/1
25 TABLET ORAL DAILY
Qty: 90 TABLET | Refills: 1 | Status: SHIPPED | OUTPATIENT
Start: 2025-05-13

## 2025-05-13 NOTE — ASSESSMENT & PLAN NOTE
Checking her urine today, Reviewed her recent labs, to work on diet, regular exercise, monitor sugars, check feet daily, see optometrist yearly or as directed.

## 2025-05-13 NOTE — ASSESSMENT & PLAN NOTE
Reviewed last lab, adding on lab done yesterday, Continue current medication and efforts with diet and exercise.

## 2025-05-13 NOTE — LETTER
Marilou Edwards  [address] [facility] [address]    May 19, 2025     Marilou,     Your urine screen was normal.          Resulted Orders   Comprehensive metabolic panel   Result Value Ref Range    Glucose 152 (H) 65 - 99 mg/dL    BUN 9 6 - 20 mg/dL    Creatinine 0.68 0.57 - 1.00 mg/dL    Sodium 141 136 - 145 mmol/L    Potassium 3.9 3.5 - 5.2 mmol/L    Chloride 102 98 - 107 mmol/L    CO2 26.8 22.0 - 29.0 mmol/L    Calcium 9.4 8.6 - 10.5 mg/dL    Total Protein 6.7 6.0 - 8.5 g/dL    Albumin 4.2 3.5 - 5.2 g/dL    ALT (SGPT) 15 1 - 33 U/L    AST (SGOT) 17 1 - 32 U/L    Alkaline Phosphatase 91 39 - 117 U/L    Total Bilirubin 0.3 0.0 - 1.2 mg/dL    Globulin 2.5 gm/dL    A/G Ratio 1.7 g/dL    BUN/Creatinine Ratio 13.2 7.0 - 25.0    Anion Gap 12.2 5.0 - 15.0 mmol/L    eGFR 100.5 >60.0 mL/min/1.73   Hemoglobin A1c   Result Value Ref Range    Hemoglobin A1C 7.30 (H) 4.80 - 5.60 %   Lipid panel   Result Value Ref Range    Total Cholesterol 157 0 - 200 mg/dL    Triglycerides 70 0 - 150 mg/dL    HDL Cholesterol 66 (H) 40 - 60 mg/dL    LDL Cholesterol  77 0 - 100 mg/dL    VLDL Cholesterol 14 5 - 40 mg/dL    LDL/HDL Ratio 1.17    Microalbumin / Creatinine Urine Ratio - Urine, Clean Catch   Result Value Ref Range    Microalbumin/Creatinine Ratio        Comment:      Unable to calculate    Creatinine, Urine 64.5 mg/dL    Microalbumin, Urine <1.2 mg/dL       If you have any questions or concerns, please don't hesitate to call.         Sincerely,        Mary Morales, APRN

## 2025-05-19 RX ORDER — FLUTICASONE PROPIONATE 50 MCG
SPRAY, SUSPENSION (ML) NASAL
Qty: 16 G | Refills: 1 | Status: SHIPPED | OUTPATIENT
Start: 2025-05-19

## 2025-05-19 NOTE — TELEPHONE ENCOUNTER
Rx Refill Note  Requested Prescriptions     Pending Prescriptions Disp Refills    fluticasone (FLONASE) 50 MCG/ACT nasal spray [Pharmacy Med Name: FLUTICASONE PROP 50 MCG SPRAY]       Sig: ADMINISTER 2 SPRAYS INTO THE NOSTRIL(S) ONCE DAILY AS DIRECTED BY PROVIDER      Last office visit with prescribing clinician: 5/13/2025   Last telemedicine visit with prescribing clinician: Visit date not found   Next office visit with prescribing clinician: Visit date not found      Melissa Nice LPN  05/19/25, 10:25 EDT   Spontaneous, unlabored and symmetrical

## 2025-06-11 NOTE — TELEPHONE ENCOUNTER
Rx Refill Note  Requested Prescriptions     Pending Prescriptions Disp Refills    fluticasone (FLONASE) 50 MCG/ACT nasal spray 16 g 2     Sig: Administer 2 sprays into the nostril(s) as directed by provider Daily.      Last office visit with prescribing clinician: 5/13/2025   Last telemedicine visit with prescribing clinician: Visit date not found   Next office visit with prescribing clinician: Visit date not found      Melissa Nice LPN  06/11/25, 16:22 EDT

## 2025-06-12 RX ORDER — FLUTICASONE PROPIONATE 50 MCG
2 SPRAY, SUSPENSION (ML) NASAL DAILY
Qty: 16 G | Refills: 2 | Status: SHIPPED | OUTPATIENT
Start: 2025-06-12

## 2025-06-13 ENCOUNTER — CONSULT (OUTPATIENT)
Dept: ONCOLOGY | Facility: HOSPITAL | Age: 60
End: 2025-06-13
Payer: MEDICAID

## 2025-06-13 VITALS
DIASTOLIC BLOOD PRESSURE: 79 MMHG | HEIGHT: 65 IN | BODY MASS INDEX: 35.89 KG/M2 | WEIGHT: 215.39 LBS | TEMPERATURE: 97.2 F | SYSTOLIC BLOOD PRESSURE: 143 MMHG | HEART RATE: 80 BPM | RESPIRATION RATE: 18 BRPM

## 2025-06-13 DIAGNOSIS — I26.99 OTHER ACUTE PULMONARY EMBOLISM WITHOUT ACUTE COR PULMONALE: ICD-10-CM

## 2025-06-13 DIAGNOSIS — C18.9 MALIGNANT NEOPLASM OF COLON, UNSPECIFIED PART OF COLON: Primary | ICD-10-CM

## 2025-06-13 NOTE — PROGRESS NOTES
Chief Complaint  History of pulmonary embolus (PE) History of colon cancer/M    Carmen, Mary Liang, *  Mary Morales, ELLI Zamarripa VANDANA Edwards presents to [facility] for history of colon cancer    Patient's colon cancer was diagnosed in 2004.  It was present in the descending colon, initially early-stage and resected.  Margins were not clear. It did recur. Recurrent at stage III. Treatment included surgical resection and chemotherapy and radiation.  She has been in remission since. Her last PET scan in 2009 was normal.  She was previously followed by Dr. Ware who is now retired.  Patient reports blood clots in her lungs that occurred while she was on chemo.  Reports she had multiple blood clots and they occurred in 2 different episodes.  She has been on warfarin in the past but is now on Xarelto.  Reports she has family history of blood clots in her family.  She also reports genetic mutation that causes her to be hypercoagulable but is not sure of the name.  Patient is feeling well overall.  She does have occasional diarrhea.  Reports it is often diet related.  She controls diarrhea with Imodium as needed.  Denies any weight loss.  Has been having some weight gain.  Denies any bleeding.  Gets colonoscopies every 5 years.  Next is due next year.  No acute concerns otherwise today.    Review of Systems   All other systems reviewed and are negative.    Current Outpatient Medications on File Prior to Visit   Medication Sig Dispense Refill    Blood Glucose Monitoring Suppl device Check blood sugar 3 times daily with One Touch Ultra Meter 1 each 0    cholecalciferol (VITAMIN D3) 25 MCG (1000 UT) tablet Take 2 tablets by mouth Daily.      fluticasone (FLONASE) 50 MCG/ACT nasal spray Administer 2 sprays into the nostril(s) as directed by provider Daily. 16 g 2    glimepiride (Amaryl) 2 MG tablet Take 1 tablet by mouth Every Morning Before Breakfast.  90 tablet 1    glucose blood (Accu-Chek Guide Test) test strip Check blood sugar daily. 100 each 3    hydroCHLOROthiazide 25 MG tablet Take 1 tablet by mouth Daily. 90 tablet 1    loratadine (CLARITIN) 10 MG tablet Take 1 tablet by mouth Daily.      losartan (COZAAR) 50 MG tablet Take 1 tablet by mouth Daily. 90 tablet 1    metFORMIN ER (GLUCOPHAGE-XR) 500 MG 24 hr tablet TAKE 1 TABLET BY MOUTH THREE TIMES A DAY (Patient taking differently: Take 2 tablets by mouth 2 (Two) Times a Day.) 270 tablet 1    metoprolol succinate XL (TOPROL-XL) 50 MG 24 hr tablet Take 1 tablet by mouth Daily. 90 tablet 1    rivaroxaban (Xarelto) 20 MG tablet Take 1 tablet by mouth Daily. 90 tablet 1    rosuvastatin (CRESTOR) 10 MG tablet Take 1 tablet by mouth Daily. 90 tablet 1    valACYclovir (VALTREX) 1000 MG tablet TAKE 1 TABLET BY MOUTH 2 (TWO) TIMES A DAY AS NEEDED FOR COLD SORES. 60 tablet 1     No current facility-administered medications on file prior to visit.       Allergies   Allergen Reactions    Adhesive Tape Rash     adhesive    Copper Chloride Rash    Metal Rash     Past Medical History:   Diagnosis Date    Acquired coagulation factor deficiency     Acute maxillary sinusitis, unspecified     Allergic     Anxiety disorder     Arthritis     Colon cancer     dx'd in 2004    Colon polyp     Deep vein thrombosis     Essential (primary) hypertension     Gestational diabetes     Hyperlipidemia, unspecified     Long term (current) use of anticoagulants     Low back pain     Other fatigue     Other hereditary and idiopathic neuropathies     Other specified disorders of nose and nasal sinuses     Pain in left finger(s)     Personal history of other venous thrombosis and embolism     Prothrombin gene mutation     one gene heterozygous/liden2    Pulmonary embolism     x2    Type 2 diabetes mellitus without complication     Unspecified superficial injury of right lower leg, initial encounter      Past Surgical History:   Procedure  "Laterality Date    APPENDECTOMY  2005    COLON RESECTION  2005 and 2006    oophrectmy/salp    COLONOSCOPY  2013    normal    COLONOSCOPY  2017    normal reults    COLONOSCOPY N/A 2022    repeat in 5 years    COLOSTOMY  2006    reversl    DILATION AND CURETTAGE, DIAGNOSTIC / THERAPEUTIC  2017    hysterscopy    FINGER SURGERY Left 2019    cyst removed    HERNIA REPAIR  2009    ventral hernia repair     Social History     Socioeconomic History    Marital status:     Number of children: 3   Tobacco Use    Smoking status: Former     Current packs/day: 0.00     Average packs/day: 1 pack/day for 17.0 years (17.0 ttl pk-yrs)     Types: Cigarettes     Start date:      Quit date:      Years since quittin.4     Passive exposure: Past    Smokeless tobacco: Never   Vaping Use    Vaping status: Never Used   Substance and Sexual Activity    Alcohol use: Not Currently     Comment: rarely    Drug use: Never    Sexual activity: Not Currently     Family History   Problem Relation Age of Onset    Hypertension Mother     Heart attack Father     Alcohol abuse Father     Hypertension Brother     Drug abuse Brother         heroin OD    Colonic polyp Sister     Anxiety disorder Sister     Drug abuse Sister     Heart attack Maternal Grandmother     Heart attack Maternal Grandfather     Heart defect Paternal Grandmother     Obesity Paternal Grandmother     Heart attack Paternal Grandfather        Objective   Physical Exam  Well appearing patient in no acute distress on RA  Anicteric sclerae, no rash on exposed skin  Respirations non-labored  Awake, alert, and oriented x 4. Speech intact. No gross neurologic deficit  Appropriate mood and affect    Vitals:    25 1106   Resp: 18   Temp: 97.2 °F (36.2 °C)   TempSrc: Temporal   Weight: 97.7 kg (215 lb 6.2 oz)   Height: 165.1 cm (65\")   PainSc: 0-No pain     ECOG score: 0         PHQ-9 Total Score:                      Result Review : "   The following data was reviewed by: Gavin Montero MD on 06/13/25:  Lab Results   Component Value Date    HGB 13.9 11/27/2024    HCT 41.5 11/27/2024    MCV 90.6 11/27/2024     11/27/2024    WBC 6.81 11/27/2024    NEUTROABS 3.54 11/27/2024    LYMPHSABS 2.65 11/27/2024    MONOSABS 0.32 11/27/2024    EOSABS 0.26 11/27/2024    BASOSABS 0.03 11/27/2024     Lab Results   Component Value Date    GLUCOSE 152 (H) 05/12/2025    BUN 9 05/12/2025    CREATININE 0.68 05/12/2025     05/12/2025    K 3.9 05/12/2025     05/12/2025    CO2 26.8 05/12/2025    CALCIUM 9.4 05/12/2025    PROTEINTOT 6.7 05/12/2025    ALBUMIN 4.2 05/12/2025    BILITOT 0.3 05/12/2025    ALKPHOS 91 05/12/2025    AST 17 05/12/2025    ALT 15 05/12/2025     Lab Results   Component Value Date    TSH 2.280 11/27/2024            Outside oncology notes personally reviewed    Labs personally reviewed      No radiology results for the last 30 days.        Assessment and Plan    Diagnoses and all orders for this visit:    1. Malignant neoplasm of colon, unspecified part of colon (Primary)    2. Other acute pulmonary embolism without acute cor pulmonale      History of colon cancer  Initially diagnosed in 2000 fortunately stage.  Initially resected but then recurred.  Recurrent stage III disease.  She was treated again with surgical resection, chemotherapy and radiation.  She has been in remission since.  As patient is in remission over 20 years, there is no indication for imaging or tumor markers.  It is still recommend to get surveillance colonoscopies every 5 years.  She is due for this next year.  Recommend vitamin D be checked on a yearly basis per PCP.  F/u here annually or as needed.     History of pulmonary embolism  History of hypercoagulable state  Patient with family history of pulmonary embolisms.  Patient developed pulmonary embolism multiple times while on chemotherapy at the time of her cancer treatment.  This is therefore likely  provoked.  However with her family history, pulmonary embolisms and multiple events the preference would be to continue with anticoagulation.  She is currently on Xarelto. Of note, she was on warfarin in the past.        I spent 45 minutes caring for Marilou on this date of service. This time includes time spent by me in the following activities:preparing for the visit, reviewing tests, obtaining and/or reviewing a separately obtained history, performing a medically appropriate examination and/or evaluation , counseling and educating the patient/family/caregiver, ordering medications, tests, or procedures, referring and communicating with other health care professionals , documenting information in the medical record, independently interpreting results and communicating that information with the patient/family/caregiver, and care coordination    Patient Follow Up: 1 year  Patient was given instructions and counseling regarding her condition or for health maintenance advice. Please see specific information pulled into the AVS if appropriate.

## 2025-07-09 DIAGNOSIS — K21.9 GASTROESOPHAGEAL REFLUX DISEASE WITHOUT ESOPHAGITIS: ICD-10-CM

## 2025-07-09 RX ORDER — PANTOPRAZOLE SODIUM 40 MG/1
40 TABLET, DELAYED RELEASE ORAL DAILY
Qty: 90 TABLET | Refills: 0 | OUTPATIENT
Start: 2025-07-09

## 2025-07-11 DIAGNOSIS — E11.9 TYPE 2 DIABETES MELLITUS WITHOUT COMPLICATION, WITHOUT LONG-TERM CURRENT USE OF INSULIN: Primary | ICD-10-CM

## 2025-07-11 NOTE — TELEPHONE ENCOUNTER
[facility] pharmacy sent a fax that her insurance doesn't cover One Touch Ultra  Blue test strips, they either need a alternative sent of a PA done.  Will need to change, this office doesn't do PA on glucose testing supplies per PA dept.  Will send in for a new machine, test strips and lancets for whichever her insurance covers.   Pended for provider to send.

## 2025-07-14 RX ORDER — LANCETS
EACH MISCELLANEOUS
Qty: 100 EACH | Refills: 3 | Status: SHIPPED | OUTPATIENT
Start: 2025-07-14

## 2025-07-21 ENCOUNTER — TELEPHONE (OUTPATIENT)
Dept: ONCOLOGY | Facility: HOSPITAL | Age: 60
End: 2025-07-21
Payer: MEDICAID

## 2025-07-21 NOTE — TELEPHONE ENCOUNTER
OSW attempted to contact patient to review her high distress screening score and to identify any barriers to care or unmet needs to address.  OSW was able to leave a voicemail with a callback number for patient if she needed additional resources.

## 2025-07-23 ENCOUNTER — TELEPHONE (OUTPATIENT)
Dept: ONCOLOGY | Facility: HOSPITAL | Age: 60
End: 2025-07-23
Payer: MEDICAID

## 2025-07-23 NOTE — TELEPHONE ENCOUNTER
OSW contacted patient to follow up on her distress screening score of 9 with concerns for grief and loss and finances. Patient stated she was follow ups to a 20 year surveillance of colon cancer. Patient stated her  and mother are also in care and she thought the call was about their services and not hers. Patient stated she was doing well and no barriers to care or unmet needs for her to be addressed. Patient was encouraged to contact OSWs if she needed any concerns addressed in the future in regards to financial, emotional, physical health, or care coordination. Patient expressed appreciation for the follow up call to her visit.